# Patient Record
Sex: FEMALE | Race: BLACK OR AFRICAN AMERICAN | NOT HISPANIC OR LATINO
[De-identification: names, ages, dates, MRNs, and addresses within clinical notes are randomized per-mention and may not be internally consistent; named-entity substitution may affect disease eponyms.]

---

## 2017-02-27 ENCOUNTER — APPOINTMENT (OUTPATIENT)
Dept: OBGYN | Facility: CLINIC | Age: 42
End: 2017-02-27

## 2017-02-27 VITALS
SYSTOLIC BLOOD PRESSURE: 112 MMHG | DIASTOLIC BLOOD PRESSURE: 62 MMHG | HEIGHT: 63 IN | WEIGHT: 150 LBS | BODY MASS INDEX: 26.58 KG/M2

## 2017-02-27 RX ORDER — VALSARTAN 80 MG/1
80 TABLET, COATED ORAL
Qty: 30 | Refills: 0 | Status: COMPLETED | COMMUNITY
Start: 2016-12-21

## 2017-02-27 RX ORDER — PENTOXIFYLLINE 400 MG/1
400 TABLET, EXTENDED RELEASE ORAL
Qty: 90 | Refills: 0 | Status: DISCONTINUED | COMMUNITY
Start: 2016-09-15

## 2017-02-27 RX ORDER — RANITIDINE 300 MG/1
300 TABLET ORAL
Qty: 30 | Refills: 0 | Status: COMPLETED | COMMUNITY
Start: 2017-01-23

## 2017-03-08 LAB — CYTOLOGY CVX/VAG DOC THIN PREP: NORMAL

## 2017-03-17 ENCOUNTER — APPOINTMENT (OUTPATIENT)
Dept: ULTRASOUND IMAGING | Facility: IMAGING CENTER | Age: 42
End: 2017-03-17

## 2017-04-07 ENCOUNTER — FORM ENCOUNTER (OUTPATIENT)
Age: 42
End: 2017-04-07

## 2017-04-08 ENCOUNTER — APPOINTMENT (OUTPATIENT)
Dept: ULTRASOUND IMAGING | Facility: IMAGING CENTER | Age: 42
End: 2017-04-08

## 2017-04-08 ENCOUNTER — OUTPATIENT (OUTPATIENT)
Dept: OUTPATIENT SERVICES | Facility: HOSPITAL | Age: 42
LOS: 1 days | End: 2017-04-08
Payer: COMMERCIAL

## 2017-04-08 DIAGNOSIS — D25.1 INTRAMURAL LEIOMYOMA OF UTERUS: ICD-10-CM

## 2017-04-08 PROCEDURE — 76856 US EXAM PELVIC COMPLETE: CPT

## 2017-04-08 PROCEDURE — 76830 TRANSVAGINAL US NON-OB: CPT

## 2017-05-01 ENCOUNTER — APPOINTMENT (OUTPATIENT)
Dept: OBGYN | Facility: CLINIC | Age: 42
End: 2017-05-01

## 2017-06-01 ENCOUNTER — APPOINTMENT (OUTPATIENT)
Dept: OBGYN | Facility: CLINIC | Age: 42
End: 2017-06-01

## 2017-06-01 VITALS
HEIGHT: 63 IN | WEIGHT: 156 LBS | HEART RATE: 81 BPM | DIASTOLIC BLOOD PRESSURE: 75 MMHG | BODY MASS INDEX: 27.64 KG/M2 | SYSTOLIC BLOOD PRESSURE: 130 MMHG

## 2017-06-01 DIAGNOSIS — Z87.448 PERSONAL HISTORY OF OTHER DISEASES OF URINARY SYSTEM: ICD-10-CM

## 2017-06-01 DIAGNOSIS — N85.2 HYPERTROPHY OF UTERUS: ICD-10-CM

## 2017-06-01 DIAGNOSIS — R10.2 PELVIC AND PERINEAL PAIN: ICD-10-CM

## 2017-06-01 DIAGNOSIS — G89.29 PELVIC AND PERINEAL PAIN: ICD-10-CM

## 2017-06-01 DIAGNOSIS — D25.1 INTRAMURAL LEIOMYOMA OF UTERUS: ICD-10-CM

## 2017-06-01 DIAGNOSIS — R10.9 UNSPECIFIED ABDOMINAL PAIN: ICD-10-CM

## 2017-06-01 DIAGNOSIS — Z87.440 PERSONAL HISTORY OF URINARY (TRACT) INFECTIONS: ICD-10-CM

## 2017-06-13 ENCOUNTER — APPOINTMENT (OUTPATIENT)
Dept: MRI IMAGING | Facility: CLINIC | Age: 42
End: 2017-06-13

## 2017-06-14 ENCOUNTER — APPOINTMENT (OUTPATIENT)
Dept: RADIOLOGY | Facility: IMAGING CENTER | Age: 42
End: 2017-06-14

## 2017-06-14 ENCOUNTER — OUTPATIENT (OUTPATIENT)
Dept: OUTPATIENT SERVICES | Facility: HOSPITAL | Age: 42
LOS: 1 days | End: 2017-06-14
Payer: COMMERCIAL

## 2017-06-14 DIAGNOSIS — R10.9 UNSPECIFIED ABDOMINAL PAIN: ICD-10-CM

## 2017-06-14 DIAGNOSIS — R10.2 PELVIC AND PERINEAL PAIN: ICD-10-CM

## 2017-06-14 DIAGNOSIS — D25.1 INTRAMURAL LEIOMYOMA OF UTERUS: ICD-10-CM

## 2017-06-14 DIAGNOSIS — N85.2 HYPERTROPHY OF UTERUS: ICD-10-CM

## 2017-06-14 PROCEDURE — 71020: CPT | Mod: 26

## 2017-06-14 PROCEDURE — 71046 X-RAY EXAM CHEST 2 VIEWS: CPT

## 2017-06-20 ENCOUNTER — APPOINTMENT (OUTPATIENT)
Dept: MRI IMAGING | Facility: IMAGING CENTER | Age: 42
End: 2017-06-20

## 2017-06-20 ENCOUNTER — OUTPATIENT (OUTPATIENT)
Dept: OUTPATIENT SERVICES | Facility: HOSPITAL | Age: 42
LOS: 1 days | End: 2017-06-20
Payer: COMMERCIAL

## 2017-06-20 DIAGNOSIS — D25.1 INTRAMURAL LEIOMYOMA OF UTERUS: ICD-10-CM

## 2017-06-20 DIAGNOSIS — R10.2 PELVIC AND PERINEAL PAIN: ICD-10-CM

## 2017-06-20 DIAGNOSIS — N85.2 HYPERTROPHY OF UTERUS: ICD-10-CM

## 2017-06-20 DIAGNOSIS — G89.29 OTHER CHRONIC PAIN: ICD-10-CM

## 2017-06-20 DIAGNOSIS — R10.9 UNSPECIFIED ABDOMINAL PAIN: ICD-10-CM

## 2017-06-20 PROCEDURE — 82565 ASSAY OF CREATININE: CPT

## 2017-06-27 ENCOUNTER — OUTPATIENT (OUTPATIENT)
Dept: OUTPATIENT SERVICES | Facility: HOSPITAL | Age: 42
LOS: 1 days | End: 2017-06-27
Payer: COMMERCIAL

## 2017-06-27 ENCOUNTER — APPOINTMENT (OUTPATIENT)
Dept: CT IMAGING | Facility: IMAGING CENTER | Age: 42
End: 2017-06-27

## 2017-06-27 DIAGNOSIS — N85.2 HYPERTROPHY OF UTERUS: ICD-10-CM

## 2017-06-27 DIAGNOSIS — R10.2 PELVIC AND PERINEAL PAIN: ICD-10-CM

## 2017-06-27 DIAGNOSIS — R10.9 UNSPECIFIED ABDOMINAL PAIN: ICD-10-CM

## 2017-06-27 PROCEDURE — 82565 ASSAY OF CREATININE: CPT

## 2017-06-27 PROCEDURE — 74177 CT ABD & PELVIS W/CONTRAST: CPT

## 2017-06-29 DIAGNOSIS — R10.9 UNSPECIFIED ABDOMINAL PAIN: ICD-10-CM

## 2017-07-05 ENCOUNTER — APPOINTMENT (OUTPATIENT)
Dept: MRI IMAGING | Facility: IMAGING CENTER | Age: 42
End: 2017-07-05

## 2017-08-09 ENCOUNTER — MEDICATION RENEWAL (OUTPATIENT)
Age: 42
End: 2017-08-09

## 2017-08-15 ENCOUNTER — APPOINTMENT (OUTPATIENT)
Dept: OBGYN | Facility: CLINIC | Age: 42
End: 2017-08-15
Payer: COMMERCIAL

## 2017-08-15 PROCEDURE — 96372 THER/PROPH/DIAG INJ SC/IM: CPT

## 2017-08-29 ENCOUNTER — EMERGENCY (EMERGENCY)
Facility: HOSPITAL | Age: 42
LOS: 1 days | Discharge: HOME | End: 2017-08-29
Attending: EMERGENCY MEDICINE | Admitting: EMERGENCY MEDICINE
Payer: COMMERCIAL

## 2017-08-29 VITALS
TEMPERATURE: 99 F | OXYGEN SATURATION: 100 % | HEART RATE: 107 BPM | RESPIRATION RATE: 16 BRPM | SYSTOLIC BLOOD PRESSURE: 117 MMHG | DIASTOLIC BLOOD PRESSURE: 77 MMHG

## 2017-08-29 VITALS
OXYGEN SATURATION: 100 % | HEART RATE: 83 BPM | TEMPERATURE: 99 F | RESPIRATION RATE: 18 BRPM | DIASTOLIC BLOOD PRESSURE: 78 MMHG | SYSTOLIC BLOOD PRESSURE: 138 MMHG

## 2017-08-29 LAB
ALBUMIN SERPL ELPH-MCNC: 2.9 G/DL — LOW (ref 3.3–5)
ALP SERPL-CCNC: 51 U/L — SIGNIFICANT CHANGE UP (ref 40–120)
ALT FLD-CCNC: 12 U/L — SIGNIFICANT CHANGE UP (ref 4–33)
AMYLASE P1 CFR SERPL: 69 U/L — SIGNIFICANT CHANGE UP (ref 25–125)
ANISOCYTOSIS BLD QL: SLIGHT — SIGNIFICANT CHANGE UP
APPEARANCE UR: SIGNIFICANT CHANGE UP
AST SERPL-CCNC: 43 U/L — HIGH (ref 4–32)
BASOPHILS # BLD AUTO: 0.03 K/UL — SIGNIFICANT CHANGE UP (ref 0–0.2)
BASOPHILS NFR BLD AUTO: 0.2 % — SIGNIFICANT CHANGE UP (ref 0–2)
BASOPHILS NFR SPEC: 0 % — SIGNIFICANT CHANGE UP (ref 0–2)
BILIRUB SERPL-MCNC: 0.5 MG/DL — SIGNIFICANT CHANGE UP (ref 0.2–1.2)
BILIRUB UR-MCNC: NEGATIVE — SIGNIFICANT CHANGE UP
BLOOD UR QL VISUAL: HIGH
BUN SERPL-MCNC: 11 MG/DL — SIGNIFICANT CHANGE UP (ref 7–23)
CALCIUM SERPL-MCNC: 8.1 MG/DL — LOW (ref 8.4–10.5)
CHLORIDE SERPL-SCNC: 101 MMOL/L — SIGNIFICANT CHANGE UP (ref 98–107)
CO2 SERPL-SCNC: 18 MMOL/L — LOW (ref 22–31)
COLOR SPEC: YELLOW — SIGNIFICANT CHANGE UP
CREAT SERPL-MCNC: 0.79 MG/DL — SIGNIFICANT CHANGE UP (ref 0.5–1.3)
DACRYOCYTES BLD QL SMEAR: SLIGHT — SIGNIFICANT CHANGE UP
EOSINOPHIL # BLD AUTO: 0.04 K/UL — SIGNIFICANT CHANGE UP (ref 0–0.5)
EOSINOPHIL NFR BLD AUTO: 0.2 % — SIGNIFICANT CHANGE UP (ref 0–6)
EOSINOPHIL NFR FLD: 0 % — SIGNIFICANT CHANGE UP (ref 0–6)
GIANT PLATELETS BLD QL SMEAR: PRESENT — SIGNIFICANT CHANGE UP
GLUCOSE SERPL-MCNC: 101 MG/DL — HIGH (ref 70–99)
GLUCOSE UR-MCNC: NEGATIVE — SIGNIFICANT CHANGE UP
HCT VFR BLD CALC: 34.6 % — SIGNIFICANT CHANGE UP (ref 34.5–45)
HGB BLD-MCNC: 11.4 G/DL — LOW (ref 11.5–15.5)
IMM GRANULOCYTES # BLD AUTO: 0.09 # — SIGNIFICANT CHANGE UP
IMM GRANULOCYTES NFR BLD AUTO: 0.5 % — SIGNIFICANT CHANGE UP (ref 0–1.5)
KETONES UR-MCNC: NEGATIVE — SIGNIFICANT CHANGE UP
LEUKOCYTE ESTERASE UR-ACNC: NEGATIVE — SIGNIFICANT CHANGE UP
LIDOCAIN IGE QN: 19 U/L — SIGNIFICANT CHANGE UP (ref 7–60)
LYMPHOCYTES # BLD AUTO: 1.84 K/UL — SIGNIFICANT CHANGE UP (ref 1–3.3)
LYMPHOCYTES # BLD AUTO: 10.6 % — LOW (ref 13–44)
LYMPHOCYTES NFR SPEC AUTO: 15.7 % — SIGNIFICANT CHANGE UP (ref 13–44)
MCHC RBC-ENTMCNC: 27.9 PG — SIGNIFICANT CHANGE UP (ref 27–34)
MCHC RBC-ENTMCNC: 32.9 % — SIGNIFICANT CHANGE UP (ref 32–36)
MCV RBC AUTO: 84.8 FL — SIGNIFICANT CHANGE UP (ref 80–100)
MICROCYTES BLD QL: SLIGHT — SIGNIFICANT CHANGE UP
MONOCYTES # BLD AUTO: 1.95 K/UL — HIGH (ref 0–0.9)
MONOCYTES NFR BLD AUTO: 11.3 % — SIGNIFICANT CHANGE UP (ref 2–14)
MONOCYTES NFR BLD: 13 % — HIGH (ref 2–9)
MUCOUS THREADS # UR AUTO: SIGNIFICANT CHANGE UP
NEUTROPHIL AB SER-ACNC: 69.6 % — SIGNIFICANT CHANGE UP (ref 43–77)
NEUTROPHILS # BLD AUTO: 13.33 K/UL — HIGH (ref 1.8–7.4)
NEUTROPHILS NFR BLD AUTO: 77.2 % — HIGH (ref 43–77)
NEUTS BAND # BLD: 1.7 % — SIGNIFICANT CHANGE UP (ref 0–6)
NITRITE UR-MCNC: NEGATIVE — SIGNIFICANT CHANGE UP
NRBC # FLD: 0 — SIGNIFICANT CHANGE UP
OVALOCYTES BLD QL SMEAR: SLIGHT — SIGNIFICANT CHANGE UP
PH UR: 6.5 — SIGNIFICANT CHANGE UP (ref 4.6–8)
PLATELET # BLD AUTO: 210 K/UL — SIGNIFICANT CHANGE UP (ref 150–400)
PLATELET COUNT - ESTIMATE: NORMAL — SIGNIFICANT CHANGE UP
PMV BLD: 11.8 FL — SIGNIFICANT CHANGE UP (ref 7–13)
POIKILOCYTOSIS BLD QL AUTO: SLIGHT — SIGNIFICANT CHANGE UP
POTASSIUM SERPL-MCNC: SIGNIFICANT CHANGE UP MMOL/L (ref 3.5–5.3)
POTASSIUM SERPL-SCNC: SIGNIFICANT CHANGE UP MMOL/L (ref 3.5–5.3)
PROT SERPL-MCNC: 7.5 G/DL — SIGNIFICANT CHANGE UP (ref 6–8.3)
PROT UR-MCNC: >600 — SIGNIFICANT CHANGE UP
RBC # BLD: 4.08 M/UL — SIGNIFICANT CHANGE UP (ref 3.8–5.2)
RBC # FLD: 13.6 % — SIGNIFICANT CHANGE UP (ref 10.3–14.5)
RBC CASTS # UR COMP ASSIST: SIGNIFICANT CHANGE UP (ref 0–?)
REVIEW TO FOLLOW: YES — SIGNIFICANT CHANGE UP
SODIUM SERPL-SCNC: 135 MMOL/L — SIGNIFICANT CHANGE UP (ref 135–145)
SP GR SPEC: 1.03 — HIGH (ref 1–1.03)
SQUAMOUS # UR AUTO: SIGNIFICANT CHANGE UP
UROBILINOGEN FLD QL: NORMAL E.U. — SIGNIFICANT CHANGE UP (ref 0.1–0.2)
WBC # BLD: 17.28 K/UL — HIGH (ref 3.8–10.5)
WBC # FLD AUTO: 17.28 K/UL — HIGH (ref 3.8–10.5)
WBC UR QL: SIGNIFICANT CHANGE UP (ref 0–?)

## 2017-08-29 PROCEDURE — 99284 EMERGENCY DEPT VISIT MOD MDM: CPT

## 2017-08-29 PROCEDURE — 74177 CT ABD & PELVIS W/CONTRAST: CPT | Mod: 26

## 2017-08-29 RX ORDER — KETOROLAC TROMETHAMINE 30 MG/ML
1 SYRINGE (ML) INJECTION
Qty: 20 | Refills: 0
Start: 2017-08-29 | End: 2017-09-03

## 2017-08-29 RX ORDER — OXYCODONE AND ACETAMINOPHEN 5; 325 MG/1; MG/1
1 TABLET ORAL
Qty: 8 | Refills: 0
Start: 2017-08-29 | End: 2017-08-31

## 2017-08-29 RX ORDER — SODIUM CHLORIDE 9 MG/ML
1000 INJECTION INTRAMUSCULAR; INTRAVENOUS; SUBCUTANEOUS ONCE
Refills: 0 | Status: COMPLETED | OUTPATIENT
Start: 2017-08-29 | End: 2017-08-29

## 2017-08-29 RX ORDER — KETOROLAC TROMETHAMINE 30 MG/ML
30 SYRINGE (ML) INJECTION ONCE
Refills: 0 | Status: DISCONTINUED | OUTPATIENT
Start: 2017-08-29 | End: 2017-08-29

## 2017-08-29 RX ADMIN — SODIUM CHLORIDE 1000 MILLILITER(S): 9 INJECTION INTRAMUSCULAR; INTRAVENOUS; SUBCUTANEOUS at 09:39

## 2017-08-29 RX ADMIN — Medication 30 MILLIGRAM(S): at 10:27

## 2017-08-29 NOTE — ED PROVIDER NOTE - ABDOMINAL EXAM
firm, distended, mass like consistency felt throughout abd up to 2in below the xiphoid, normal bowel sounds, no focal tenderness, no CVA tenderness

## 2017-08-29 NOTE — ED PROVIDER NOTE - PROGRESS NOTE DETAILS
Annmarie BLANCO- pt is abel mejia after toradol, spoke to gyn regarding her ct results and recommend outpatient f.u, I discussed elevated wbc with patients, pt had fever - sub once over the weekend, no evidence of infection noted on ua, ct abd, no other signs and symptoms. Pt given toradol and percocet prescription, advised to return promptly in c/o worsening symptoms

## 2017-08-29 NOTE — ED PROVIDER NOTE - MEDICAL DECISION MAKING DETAILS
43 y/o F w/ PMHx of lupus and fibroids, presents to the ED c/o diffuse right sided abd pain. Initial evaluation pt is refusing CT. Concern for aggressive fibroid vs tumor, on June CT in system it appears 16cm fibroid but should not be covering entire abd. Pt states belly has been the same just pain is different and wants time to think about it. Will check labs in the meantime.

## 2017-09-01 ENCOUNTER — INPATIENT (INPATIENT)
Facility: HOSPITAL | Age: 42
LOS: 1 days | Discharge: HOME | End: 2017-09-03
Attending: OBSTETRICS & GYNECOLOGY | Admitting: OBSTETRICS & GYNECOLOGY
Payer: COMMERCIAL

## 2017-09-01 ENCOUNTER — RESULT REVIEW (OUTPATIENT)
Age: 42
End: 2017-09-01

## 2017-09-01 ENCOUNTER — TRANSCRIPTION ENCOUNTER (OUTPATIENT)
Age: 42
End: 2017-09-01

## 2017-09-01 VITALS
OXYGEN SATURATION: 100 % | SYSTOLIC BLOOD PRESSURE: 114 MMHG | RESPIRATION RATE: 16 BRPM | HEART RATE: 118 BPM | TEMPERATURE: 100 F | DIASTOLIC BLOOD PRESSURE: 78 MMHG

## 2017-09-01 DIAGNOSIS — D25.9 LEIOMYOMA OF UTERUS, UNSPECIFIED: ICD-10-CM

## 2017-09-01 DIAGNOSIS — M32.9 SYSTEMIC LUPUS ERYTHEMATOSUS, UNSPECIFIED: ICD-10-CM

## 2017-09-01 LAB
ALBUMIN SERPL ELPH-MCNC: 2.7 G/DL — LOW (ref 3.3–5)
ALP SERPL-CCNC: 73 U/L — SIGNIFICANT CHANGE UP (ref 40–120)
ALT FLD-CCNC: 10 U/L — SIGNIFICANT CHANGE UP (ref 4–33)
APPEARANCE UR: CLEAR — SIGNIFICANT CHANGE UP
APTT BLD: 31.6 SEC — SIGNIFICANT CHANGE UP (ref 27.5–37.4)
AST SERPL-CCNC: 21 U/L — SIGNIFICANT CHANGE UP (ref 4–32)
BACTERIA # UR AUTO: SIGNIFICANT CHANGE UP
BASE EXCESS BLDV CALC-SCNC: -0.4 MMOL/L — SIGNIFICANT CHANGE UP
BASOPHILS # BLD AUTO: 0.03 K/UL — SIGNIFICANT CHANGE UP (ref 0–0.2)
BASOPHILS NFR BLD AUTO: 0.2 % — SIGNIFICANT CHANGE UP (ref 0–2)
BILIRUB SERPL-MCNC: 0.4 MG/DL — SIGNIFICANT CHANGE UP (ref 0.2–1.2)
BILIRUB UR-MCNC: NEGATIVE — SIGNIFICANT CHANGE UP
BLD GP AB SCN SERPL QL: NEGATIVE — SIGNIFICANT CHANGE UP
BLOOD GAS VENOUS - CREATININE: 0.84 MG/DL — SIGNIFICANT CHANGE UP (ref 0.5–1.3)
BLOOD UR QL VISUAL: HIGH
BUN SERPL-MCNC: 6 MG/DL — LOW (ref 7–23)
CALCIUM SERPL-MCNC: 8.5 MG/DL — SIGNIFICANT CHANGE UP (ref 8.4–10.5)
CHLORIDE BLDV-SCNC: 105 MMOL/L — SIGNIFICANT CHANGE UP (ref 96–108)
CHLORIDE SERPL-SCNC: 99 MMOL/L — SIGNIFICANT CHANGE UP (ref 98–107)
CO2 SERPL-SCNC: 20 MMOL/L — LOW (ref 22–31)
COLOR SPEC: YELLOW — SIGNIFICANT CHANGE UP
CREAT SERPL-MCNC: 0.74 MG/DL — SIGNIFICANT CHANGE UP (ref 0.5–1.3)
EOSINOPHIL # BLD AUTO: 0.02 K/UL — SIGNIFICANT CHANGE UP (ref 0–0.5)
EOSINOPHIL NFR BLD AUTO: 0.1 % — SIGNIFICANT CHANGE UP (ref 0–6)
GAS PNL BLDV: 133 MMOL/L — LOW (ref 136–146)
GLUCOSE BLDV-MCNC: 130 — HIGH (ref 70–99)
GLUCOSE SERPL-MCNC: 123 MG/DL — HIGH (ref 70–99)
GLUCOSE UR-MCNC: NEGATIVE — SIGNIFICANT CHANGE UP
HCG SERPL-ACNC: < 5 MIU/ML — SIGNIFICANT CHANGE UP
HCO3 BLDV-SCNC: 23 MMOL/L — SIGNIFICANT CHANGE UP (ref 20–27)
HCT VFR BLD CALC: 32.2 % — LOW (ref 34.5–45)
HCT VFR BLDV CALC: 33.2 % — LOW (ref 34.5–45)
HGB BLD-MCNC: 10.7 G/DL — LOW (ref 11.5–15.5)
HGB BLDV-MCNC: 10.8 G/DL — LOW (ref 11.5–15.5)
IMM GRANULOCYTES # BLD AUTO: 0.13 # — SIGNIFICANT CHANGE UP
IMM GRANULOCYTES NFR BLD AUTO: 0.8 % — SIGNIFICANT CHANGE UP (ref 0–1.5)
INR BLD: 1.13 — SIGNIFICANT CHANGE UP (ref 0.88–1.17)
KETONES UR-MCNC: NEGATIVE — SIGNIFICANT CHANGE UP
LACTATE BLDV-MCNC: 1.7 MMOL/L — SIGNIFICANT CHANGE UP (ref 0.5–2)
LEUKOCYTE ESTERASE UR-ACNC: NEGATIVE — SIGNIFICANT CHANGE UP
LYMPHOCYTES # BLD AUTO: 1.3 K/UL — SIGNIFICANT CHANGE UP (ref 1–3.3)
LYMPHOCYTES # BLD AUTO: 7.5 % — LOW (ref 13–44)
MCHC RBC-ENTMCNC: 28 PG — SIGNIFICANT CHANGE UP (ref 27–34)
MCHC RBC-ENTMCNC: 33.2 % — SIGNIFICANT CHANGE UP (ref 32–36)
MCV RBC AUTO: 84.3 FL — SIGNIFICANT CHANGE UP (ref 80–100)
MONOCYTES # BLD AUTO: 2.21 K/UL — HIGH (ref 0–0.9)
MONOCYTES NFR BLD AUTO: 12.8 % — SIGNIFICANT CHANGE UP (ref 2–14)
NEUTROPHILS # BLD AUTO: 13.55 K/UL — HIGH (ref 1.8–7.4)
NEUTROPHILS NFR BLD AUTO: 78.6 % — HIGH (ref 43–77)
NITRITE UR-MCNC: NEGATIVE — SIGNIFICANT CHANGE UP
NRBC # FLD: 0 — SIGNIFICANT CHANGE UP
PCO2 BLDV: 42 MMHG — SIGNIFICANT CHANGE UP (ref 41–51)
PH BLDV: 7.37 PH — SIGNIFICANT CHANGE UP (ref 7.32–7.43)
PH UR: 7 — SIGNIFICANT CHANGE UP (ref 4.6–8)
PLATELET # BLD AUTO: 264 K/UL — SIGNIFICANT CHANGE UP (ref 150–400)
PMV BLD: 11.6 FL — SIGNIFICANT CHANGE UP (ref 7–13)
PO2 BLDV: < 24 MMHG — LOW (ref 35–40)
POTASSIUM BLDV-SCNC: 3.8 MMOL/L — SIGNIFICANT CHANGE UP (ref 3.4–4.5)
POTASSIUM SERPL-MCNC: 3.8 MMOL/L — SIGNIFICANT CHANGE UP (ref 3.5–5.3)
POTASSIUM SERPL-SCNC: 3.8 MMOL/L — SIGNIFICANT CHANGE UP (ref 3.5–5.3)
PROT SERPL-MCNC: 7.4 G/DL — SIGNIFICANT CHANGE UP (ref 6–8.3)
PROT UR-MCNC: 500 — HIGH
PROTHROM AB SERPL-ACNC: 12.7 SEC — SIGNIFICANT CHANGE UP (ref 9.8–13.1)
RBC # BLD: 3.82 M/UL — SIGNIFICANT CHANGE UP (ref 3.8–5.2)
RBC # FLD: 13.7 % — SIGNIFICANT CHANGE UP (ref 10.3–14.5)
RBC CASTS # UR COMP ASSIST: HIGH (ref 0–?)
RH IG SCN BLD-IMP: NEGATIVE — SIGNIFICANT CHANGE UP
RH IG SCN BLD-IMP: NEGATIVE — SIGNIFICANT CHANGE UP
SAO2 % BLDV: 22.4 % — LOW (ref 60–85)
SODIUM SERPL-SCNC: 136 MMOL/L — SIGNIFICANT CHANGE UP (ref 135–145)
SP GR SPEC: 1.01 — SIGNIFICANT CHANGE UP (ref 1–1.03)
SQUAMOUS # UR AUTO: SIGNIFICANT CHANGE UP
UROBILINOGEN FLD QL: NORMAL E.U. — SIGNIFICANT CHANGE UP (ref 0.1–0.2)
WBC # BLD: 17.24 K/UL — HIGH (ref 3.8–10.5)
WBC # FLD AUTO: 17.24 K/UL — HIGH (ref 3.8–10.5)
WBC UR QL: SIGNIFICANT CHANGE UP (ref 0–?)

## 2017-09-01 PROCEDURE — 88307 TISSUE EXAM BY PATHOLOGIST: CPT | Mod: 26

## 2017-09-01 PROCEDURE — 88304 TISSUE EXAM BY PATHOLOGIST: CPT | Mod: 26

## 2017-09-01 PROCEDURE — 88331 PATH CONSLTJ SURG 1 BLK 1SPC: CPT | Mod: 26

## 2017-09-01 PROCEDURE — 58150 TOTAL HYSTERECTOMY: CPT

## 2017-09-01 RX ORDER — ACETAMINOPHEN 500 MG
1000 TABLET ORAL ONCE
Refills: 0 | Status: COMPLETED | OUTPATIENT
Start: 2017-09-01 | End: 2017-09-01

## 2017-09-01 RX ORDER — HEPARIN SODIUM 5000 [USP'U]/ML
5000 INJECTION INTRAVENOUS; SUBCUTANEOUS ONCE
Refills: 0 | Status: COMPLETED | OUTPATIENT
Start: 2017-09-01 | End: 2017-09-01

## 2017-09-01 RX ORDER — HYDROMORPHONE HYDROCHLORIDE 2 MG/ML
0.5 INJECTION INTRAMUSCULAR; INTRAVENOUS; SUBCUTANEOUS
Refills: 0 | Status: DISCONTINUED | OUTPATIENT
Start: 2017-09-01 | End: 2017-09-02

## 2017-09-01 RX ORDER — HEPARIN SODIUM 5000 [USP'U]/ML
5000 INJECTION INTRAVENOUS; SUBCUTANEOUS EVERY 12 HOURS
Refills: 0 | Status: DISCONTINUED | OUTPATIENT
Start: 2017-09-01 | End: 2017-09-03

## 2017-09-01 RX ORDER — ACETAMINOPHEN 500 MG
975 TABLET ORAL EVERY 6 HOURS
Refills: 0 | Status: DISCONTINUED | OUTPATIENT
Start: 2017-09-01 | End: 2017-09-03

## 2017-09-01 RX ORDER — NALOXONE HYDROCHLORIDE 4 MG/.1ML
0.1 SPRAY NASAL
Refills: 0 | Status: DISCONTINUED | OUTPATIENT
Start: 2017-09-01 | End: 2017-09-03

## 2017-09-01 RX ORDER — DIPHENHYDRAMINE HCL 50 MG
50 CAPSULE ORAL EVERY 4 HOURS
Refills: 0 | Status: DISCONTINUED | OUTPATIENT
Start: 2017-09-01 | End: 2017-09-03

## 2017-09-01 RX ORDER — HYDROMORPHONE HYDROCHLORIDE 2 MG/ML
1 INJECTION INTRAMUSCULAR; INTRAVENOUS; SUBCUTANEOUS
Refills: 0 | Status: DISCONTINUED | OUTPATIENT
Start: 2017-09-01 | End: 2017-09-02

## 2017-09-01 RX ORDER — HYDROXYCHLOROQUINE SULFATE 200 MG
400 TABLET ORAL ONCE
Refills: 0 | Status: COMPLETED | OUTPATIENT
Start: 2017-09-01 | End: 2017-09-01

## 2017-09-01 RX ORDER — KETOROLAC TROMETHAMINE 30 MG/ML
15 SYRINGE (ML) INJECTION ONCE
Refills: 0 | Status: DISCONTINUED | OUTPATIENT
Start: 2017-09-01 | End: 2017-09-01

## 2017-09-01 RX ORDER — MYCOPHENOLATE MOFETIL 250 MG/1
1500 CAPSULE ORAL
Refills: 0 | Status: DISCONTINUED | OUTPATIENT
Start: 2017-09-01 | End: 2017-09-03

## 2017-09-01 RX ORDER — HYDROMORPHONE HYDROCHLORIDE 2 MG/ML
30 INJECTION INTRAMUSCULAR; INTRAVENOUS; SUBCUTANEOUS
Refills: 0 | Status: DISCONTINUED | OUTPATIENT
Start: 2017-09-01 | End: 2017-09-02

## 2017-09-01 RX ORDER — MYCOPHENOLATE MOFETIL 250 MG/1
3000 CAPSULE ORAL ONCE
Refills: 0 | Status: DISCONTINUED | OUTPATIENT
Start: 2017-09-01 | End: 2017-09-01

## 2017-09-01 RX ORDER — SODIUM CHLORIDE 9 MG/ML
1000 INJECTION, SOLUTION INTRAVENOUS
Refills: 0 | Status: DISCONTINUED | OUTPATIENT
Start: 2017-09-01 | End: 2017-09-02

## 2017-09-01 RX ORDER — CHLORHEXIDINE GLUCONATE 213 G/1000ML
1 SOLUTION TOPICAL DAILY
Refills: 0 | Status: DISCONTINUED | OUTPATIENT
Start: 2017-09-01 | End: 2017-09-01

## 2017-09-01 RX ORDER — ONDANSETRON 8 MG/1
4 TABLET, FILM COATED ORAL EVERY 6 HOURS
Refills: 0 | Status: DISCONTINUED | OUTPATIENT
Start: 2017-09-01 | End: 2017-09-03

## 2017-09-01 RX ORDER — VALSARTAN 80 MG/1
80 TABLET ORAL DAILY
Refills: 0 | Status: DISCONTINUED | OUTPATIENT
Start: 2017-09-01 | End: 2017-09-01

## 2017-09-01 RX ORDER — KETOROLAC TROMETHAMINE 30 MG/ML
30 SYRINGE (ML) INJECTION EVERY 6 HOURS
Refills: 0 | Status: DISCONTINUED | OUTPATIENT
Start: 2017-09-01 | End: 2017-09-02

## 2017-09-01 RX ORDER — MORPHINE SULFATE 50 MG/1
4 CAPSULE, EXTENDED RELEASE ORAL ONCE
Refills: 0 | Status: DISCONTINUED | OUTPATIENT
Start: 2017-09-01 | End: 2017-09-01

## 2017-09-01 RX ORDER — VALSARTAN 80 MG/1
80 TABLET ORAL DAILY
Refills: 0 | Status: DISCONTINUED | OUTPATIENT
Start: 2017-09-01 | End: 2017-09-03

## 2017-09-01 RX ORDER — SODIUM CHLORIDE 9 MG/ML
2000 INJECTION INTRAMUSCULAR; INTRAVENOUS; SUBCUTANEOUS ONCE
Refills: 0 | Status: COMPLETED | OUTPATIENT
Start: 2017-09-01 | End: 2017-09-01

## 2017-09-01 RX ADMIN — Medication 50 MILLIGRAM(S): at 21:15

## 2017-09-01 RX ADMIN — SODIUM CHLORIDE 125 MILLILITER(S): 9 INJECTION, SOLUTION INTRAVENOUS at 17:44

## 2017-09-01 RX ADMIN — HYDROMORPHONE HYDROCHLORIDE 30 MILLILITER(S): 2 INJECTION INTRAMUSCULAR; INTRAVENOUS; SUBCUTANEOUS at 16:00

## 2017-09-01 RX ADMIN — MORPHINE SULFATE 4 MILLIGRAM(S): 50 CAPSULE, EXTENDED RELEASE ORAL at 08:31

## 2017-09-01 RX ADMIN — HEPARIN SODIUM 5000 UNIT(S): 5000 INJECTION INTRAVENOUS; SUBCUTANEOUS at 21:15

## 2017-09-01 RX ADMIN — MORPHINE SULFATE 4 MILLIGRAM(S): 50 CAPSULE, EXTENDED RELEASE ORAL at 09:09

## 2017-09-01 RX ADMIN — HYDROMORPHONE HYDROCHLORIDE 30 MILLILITER(S): 2 INJECTION INTRAMUSCULAR; INTRAVENOUS; SUBCUTANEOUS at 19:12

## 2017-09-01 RX ADMIN — SODIUM CHLORIDE 2000 MILLILITER(S): 9 INJECTION INTRAMUSCULAR; INTRAVENOUS; SUBCUTANEOUS at 07:47

## 2017-09-01 RX ADMIN — CHLORHEXIDINE GLUCONATE 1 APPLICATION(S): 213 SOLUTION TOPICAL at 11:25

## 2017-09-01 RX ADMIN — Medication 400 MILLIGRAM(S): at 09:02

## 2017-09-01 RX ADMIN — Medication 1000 MILLIGRAM(S): at 09:23

## 2017-09-01 RX ADMIN — HYDROMORPHONE HYDROCHLORIDE 0.5 MILLIGRAM(S): 2 INJECTION INTRAMUSCULAR; INTRAVENOUS; SUBCUTANEOUS at 16:52

## 2017-09-01 RX ADMIN — SODIUM CHLORIDE 125 MILLILITER(S): 9 INJECTION, SOLUTION INTRAVENOUS at 21:15

## 2017-09-01 RX ADMIN — HEPARIN SODIUM 5000 UNIT(S): 5000 INJECTION INTRAVENOUS; SUBCUTANEOUS at 11:27

## 2017-09-01 RX ADMIN — Medication 400 MILLIGRAM(S): at 21:15

## 2017-09-01 RX ADMIN — HYDROMORPHONE HYDROCHLORIDE 30 MILLILITER(S): 2 INJECTION INTRAMUSCULAR; INTRAVENOUS; SUBCUTANEOUS at 17:44

## 2017-09-01 RX ADMIN — SODIUM CHLORIDE 125 MILLILITER(S): 9 INJECTION, SOLUTION INTRAVENOUS at 10:27

## 2017-09-01 NOTE — BRIEF OPERATIVE NOTE - PROCEDURE
Salpingectomy, left  09/01/2017    Active  [email or web address]  Right salpingo-oophorectomy  09/01/2017    Active  [email or web address]  Total abdominal hysterectomy  09/01/2017    Active  [email or web address]

## 2017-09-01 NOTE — BRIEF OPERATIVE NOTE - OPERATION/FINDINGS
large 24wk size fibroid uterus, adhesion of right adnexa to uterus otherwise grossly normal adnexa bilaterally, normal appendix  frozen section-benign

## 2017-09-01 NOTE — PROGRESS NOTE ADULT - PROBLEM SELECTOR PLAN 1
Neuro: c/w PCA  CV: VSS, CBC in AM  Pulm: Saturating well on room air, encourage incentive spirometry  GI: Clear liquid diet  : UOP adequate, c/w jordan until POD#1  Heme: HSQ and SCDs for DVT ppx  Dispo: Continue routine post-op care Neuro: c/w PCA  CV: VSS, CBC in AM  Pulm: Saturating well on room air, encourage incentive spirometry  GI: Tolerating clears well, no nausea, give regular diet  : UOP adequate, c/w jordan until POD#1  Heme: HSQ and SCDs for DVT ppx  Dispo: Continue routine post-op care

## 2017-09-01 NOTE — ED ADULT NURSE NOTE - OBJECTIVE STATEMENT
Patient bought in room 8 via wheelchair, appears very uncomfortable, alert and oriented x4, respirations even and unlabored, abdomen firm distended, tender to palpation, no edema noted. Patient is unable to lie flat without assistance due to discomfort. 20 gauge saline lock placed on right AC, blood drawn and sent, including cultures. Urinalysis and urine culture sent. Will follow up and monitor.

## 2017-09-01 NOTE — ED PROVIDER NOTE - MEDICAL DECISION MAKING DETAILS
Pt is a 43 y/o F nonsmoker PMHx lupus, fibroids p/w abdominal pain x 1 week -- degenerating fibroid -- labs, blood cultures, fluids, ua, pain control, gyn consult

## 2017-09-01 NOTE — H&P ADULT - FAMILY HISTORY
Grandparent  Still living? Unknown  Family history of systemic lupus erythematosus, Age at diagnosis: Age Unknown

## 2017-09-01 NOTE — H&P ADULT - ATTENDING COMMENTS
41yo P1, LMP 8/26, with known enlarged fibroid uterus currently undergoing medical management with Lupron presents for the second time in 48hrs with severe abdominal pain secondary to degenerating uterine fibroids.  Discussed in detail medical vs surgical management.  Pt had been planning to undergo definitive surgical management as an outpatient with Dr. Agee.  Given the increased severe pain she would like to proceed with surgery sooner.  Discussed surgical management would include a total vs supracervical hysterectomy and bilateral salpingectomy.  Given the size of the uterus she would need a vertical laparotomy.  Risks including bleeding, infection (especially in the setting of a degenerating fibroid) and damage to internal structures including bowel, bladder, ureters, ovaries explained in detail.  I explained that this procedure would render the patient infertile and she would no longer be able to have children.  Questions and concerns addressed to apparent satisfaction. Consents obtained and witnessed.  Will proceed to the OR.  Plan also discussed with Dr. Agee.    Sandhya Garibay

## 2017-09-01 NOTE — H&P ADULT - NSHPPHYSICALEXAM_GEN_ALL_CORE
Constitutional: AAOx3, NAD  Respiratory: CTA b/l  Cardiovascular: RRR, no murmurs, rubs, gallops  Gastrointestinal: Tender to light palpation in the RUQ and RLQ, nondistended, no rebound, no guarding  Speculum exam: blood and small clot in  vaginal vault. Scan vaginal discharge, multiparous cervix.  Bimanual exam: Bulky, uterus approximately 24 week size. R fundus tender to palpation. Adnexa nonpalpable bilaterally, no CMT. Constitutional: AAOx3, notable discomfort with movement  Gastrointestinal: Tender to light palpation in the RUQ and RLQ, nondistended, no rebound, no guarding  Speculum exam: blood and small clot in vaginal vault. Multiparous cervix.  Bimanual exam: Bulky, uterus approximately 24 week size. R fundus tender to palpation. Adnexa nonpalpable bilaterally, no CMT.

## 2017-09-01 NOTE — H&P ADULT - ASSESSMENT
41 yo , LMP: 17, with a PMH of fibroids and lupus presents with RLQ abdominal pain and fever x 1 week, s/p Lupron treatment. Admitted for 19 x 12.9 x 14.3 cm degenerating fibroid requiring a total abdominal hysterectomy today. 43 yo , LMP: 17, with a PMH of fibroids and lupus presents with RLQ abdominal pain and fever x 1 week, s/p Lupron treatment. Admitted for 19 x 12.9 x 14.3 cm degenerating fibroid with significant abdominal pain.

## 2017-09-01 NOTE — ED ADULT TRIAGE NOTE - CHIEF COMPLAINT QUOTE
Pt c/o RLQ pain and lower back pain since Saturday with fever (Tmax 102) nausea and diarrhea (2 episodes today.) States she was here on Tuesday with the same complaints. Unable to ambulate 2/2 pain. Appears uncomfortable in triage

## 2017-09-01 NOTE — PROGRESS NOTE ADULT - PROBLEM SELECTOR PLAN 2
- back on home meds: cellcept 3000mg, plaquinil 400mg, valsartan 80mg  - s/p stress dose of steroids  - prednisone taper: - home meds were: cellcept 1500mg BID, plaquinil 400mg daily, valsartan 80mg daily, 10mg prednisone  - continue with cellcept, plaquenil, valsartan as per above home regimen  - s/p stress dose of steroids (100mg hydrocortisone intraop)  - prednisone taper: 50mg q8h x 3 doses, 20mg q8h x 3 doses, 10mg q8h x 3 doses, then resume 10mg daily.    D/w Dr. Colt Ledbetter, PGY1 - home meds were: cellcept 1500mg BID, plaquinil 400mg daily, valsartan 80mg daily, 10mg prednisone  - continue with cellcept, plaquenil, valsartan as per above home regimen  - s/p stress dose of steroids (100mg hydrocortisone intraop)  - prednisone taper: 50mg q8h x 3 doses, 20mg q8h x 3 doses, 10mg q8h x 3 doses, then resume 10mg daily. (discussed this taper, verified as appropriate w inpt pharmacy [id number])    D/w Dr. Colt Ledbetter, PGY1

## 2017-09-01 NOTE — H&P ADULT - NSHPLABSRESULTS_GEN_ALL_CORE
LABS:  CBC (09-01 @ 08:00)                              10.7<L>                         17.24<H>  )----------------(  264        78.6<H>% Neutrophils, 7.5<L>% Lymphocytes, ANC: 13.55<H>                              32.2<L>                BMP (09-01 @ 08:00)             136     |  99      |  6<L>  		Ca++ --      Ca 8.5                ---------------------------------( 123<H>		Mg --                 3.8     |  20<L>   |  0.74  			Ph --        LFTs (09-01 @ 08:00)      TPro 7.4 / Alb 2.7<L> / TBili 0.4 / DBili -- / AST 21 / ALT 10 / AlkPhos 73    Coags (09-01 @ 08:00)  aPTT 31.6 / INR 1.13 / PT 12.7    ABG (09-01 @ 08:00)      /  /  /  /  / %     Lactate:   1.7    VBG (09-01 @ 08:00)     7.37 / 42 / < 24<L> / 23 / -0.4 / 22.4<L>%    IMAGING:  < from: CT Abdomen and Pelvis w/ IV Cont (08.29.17 @ 11:59) >    EXAM:  CT ABDOMEN AND PELVIS IC        PROCEDURE DATE:  Aug 29 2017         INTERPRETATION:  CLINICAL INFORMATION: Right lower quadrant pain for 3   days.    COMPARISON: CT scan of the abdomen and pelvis from June 27, 2017. Pelvic   sonogram from April 8, 2017.    PROCEDURE:   CT of the Abdomen and Pelvis was performed with intravenous contrast.   Intravenous contrast: 90 ml Omnipaque 350. 10 ml discarded.  Oral contrast: None.  Sagittal and coronal reformats were performed.    FINDINGS:    LOWER CHEST: Minimal linear atelectasis in the left base.    LIVER: Within normal limits.  BILE DUCTS: Normal caliber.  GALLBLADDER: Within normal limits.  SPLEEN: Within normal limits.  PANCREAS: Within normal limits.  ADRENALS: Within normal limits.  KIDNEYS/URETERS: Within normal limits.    BLADDER: Nondistended limiting evaluation.  REPRODUCTIVE ORGANS: An enlarged fibroid uterus is again seen. A large   right-sided uterine fibroid is again noted. It measures 19 cm   superior-inferior x 12.9 cm AP x 14.3 cm transverse to slightly larger.   It has undergone complete cystic degeneration/necrosis. Calcification is   again present. The remainder of the uterus is deviated to the left.  No adnexal masses are seen.    BOWEL: No bowel obstruction. Appendix is unremarkable.  PERITONEUM: No ascites.  VESSELS:  Within normal limits.  RETROPERITONEUM: No lymphadenopathy.    ABDOMINAL WALL: Within normal limits.  BONES: Within normal limits.    IMPRESSION: Enlarged fibroid uterus.    A very large right-sided uterine fibroid is slightly larger in size and   has undergone interval complete cystic degeneration/necrosis.    Normal appendix.    < end of copied text > LABS:  CBC (09-01 @ 08:00)                              10.7<L>                         17.24<H>  )----------------(  264        78.6<H>% Neutrophils, 7.5<L>% Lymphocytes, ANC: 13.55<H>                              32.2<L>                BMP (09-01 @ 08:00)             136     |  99      |  6<L>  		Ca++ --      Ca 8.5                ---------------------------------( 123<H>		Mg --                 3.8     |  20<L>   |  0.74  			Ph --        LFTs (09-01 @ 08:00)      TPro 7.4 / Alb 2.7<L> / TBili 0.4 / DBili -- / AST 21 / ALT 10 / AlkPhos 73    Coags (09-01 @ 08:00)  aPTT 31.6 / INR 1.13 / PT 12.7    ABG (09-01 @ 08:00)      /  /  /  /  / %     Lactate:   1.7    VBG (09-01 @ 08:00)     7.37 / 42 / < 24<L> / 23 / -0.4 / 22.4<L>%    Type + Screen (09.01.17 @ 07:29)    ABO Interpretation: O    Rh Interpretation: Negative    Antibody Screen: Negative      IMAGING:  < from: CT Abdomen and Pelvis w/ IV Cont (08.29.17 @ 11:59) >    EXAM:  CT ABDOMEN AND PELVIS IC        PROCEDURE DATE:  Aug 29 2017         INTERPRETATION:  CLINICAL INFORMATION: Right lower quadrant pain for 3   days.    COMPARISON: CT scan of the abdomen and pelvis from June 27, 2017. Pelvic   sonogram from April 8, 2017.    PROCEDURE:   CT of the Abdomen and Pelvis was performed with intravenous contrast.   Intravenous contrast: 90 ml Omnipaque 350. 10 ml discarded.  Oral contrast: None.  Sagittal and coronal reformats were performed.    FINDINGS:    LOWER CHEST: Minimal linear atelectasis in the left base.    LIVER: Within normal limits.  BILE DUCTS: Normal caliber.  GALLBLADDER: Within normal limits.  SPLEEN: Within normal limits.  PANCREAS: Within normal limits.  ADRENALS: Within normal limits.  KIDNEYS/URETERS: Within normal limits.    BLADDER: Nondistended limiting evaluation.  REPRODUCTIVE ORGANS: An enlarged fibroid uterus is again seen. A large   right-sided uterine fibroid is again noted. It measures 19 cm   superior-inferior x 12.9 cm AP x 14.3 cm transverse to slightly larger.   It has undergone complete cystic degeneration/necrosis. Calcification is   again present. The remainder of the uterus is deviated to the left.  No adnexal masses are seen.    BOWEL: No bowel obstruction. Appendix is unremarkable.  PERITONEUM: No ascites.  VESSELS:  Within normal limits.  RETROPERITONEUM: No lymphadenopathy.    ABDOMINAL WALL: Within normal limits.  BONES: Within normal limits.    IMPRESSION: Enlarged fibroid uterus.    A very large right-sided uterine fibroid is slightly larger in size and   has undergone interval complete cystic degeneration/necrosis.    Normal appendix.    < end of copied text >

## 2017-09-01 NOTE — H&P ADULT - PROBLEM SELECTOR PLAN 1
- Plan for OR today, consents obtained  - NPO  - Pre-op labs  - Pain control with Toradol and IV Tylenol    D/w Dr. Stiles and Dr. Sascha Arteaga PGY1 - Plan for OR today, consents obtained, 2u pRBC on hold for OR  - NPO, IVF  - Pain control with Toradol and IV Tylenol    D/w Dr. Stiles and Dr. Sascha Arteaga, PGY1 - Discussed surgical vs continue medical management with patient  - Plan for OR today, consents obtained, 2u pRBC on hold for OR  - NPO, IVF  - Pain control with Toradol and IV Tylenol    Pt seen and examined and plan of care discussed with Dr. Stiles and Dr. Ben Arteaga, PGY1

## 2017-09-01 NOTE — ED PROVIDER NOTE - ATTENDING CONTRIBUTION TO CARE
[place]   pt here with c/o low abd pain  fever    seen here recently for same  CT abd  with large fibroid uterus  Pt with persistent pain despite pain meds  no URI sxs no urinary sxs  2 soft stools today  no rashes or joint aches  exam  pt not toxic appearing  clear lungs  card Mild tachycardia  regular  no gallop  abd  uterus palpated above umbilicus  mild diffuse tenderness   focally tender RLQ  no focal edema  no focal joint stiffness   c/w necrotic fibroid  will check labwork  GYN to see

## 2017-09-01 NOTE — H&P ADULT - HISTORY OF PRESENT ILLNESS
43 yo , LMP :17, with a PMH of fibroids and uterus presents to ED with increase RLQ pain and fever x 1 week. Pt sees Dr. Agee with medical management of a large, right sided myoma. Pt received Lupron 2-3 weeks ago. Pain is a 10/10 on the pain scale and began when her menses started. Pt was in the ED for the same problem on . CT scan was significant for a 19 cm superior-inferior x 12.9 cm AP x14.3 cm transverse myoma that has undergone interval complete cystic degeneration/necrosis. Pt given Toradol for pain and discharged with percocet. Pt has minimal relief with Tylenol and percocet. Pain radiates to her R thigh and has difficulty ambulating due to pain. She reports fever at home of 100.2. Today in the ED she had a rectal temp of 102.5 43 yo , LMP: 17, with a PMH of fibroids and Lupus presents to ED with increase RLQ pain and fever x 1 week. Pt's GYN is Dr. Agee for surgical management of a  large, right sided myoma. Pt received Lupron 2-3 weeks ago prior to obtaining a scheduled OR date. RLQ pain began when her menses started 1 week ago and is a 10/10 on the pain scale that radiates to her R thigh. Pt has difficulty ambulating. She endorses low grade fever at home (100.2). Pt initially presented to the ED on  for the same pain. CT scan showed a 19 cm superior-inferior x 12.9 cm AP x14.3 cm transverse myoma that has undergone interval complete cystic degeneration/necrosis. Pt was given Toradol for pain and discharged with percocet. Pt has since had minimal relief with Tylenol and percocet. She's had 2 episodes of watery diarrhea this week. Today in the ED she had a rectal temp of 102.5. Denies nausea, vaginal discharge, dysuria. 41 yo , LMP: 17, with a PMH of fibroids and Lupus presents to ED with increased RLQ pain and fever x 1 week. Pt is being seen by Dr. Agee (GYN) for possible surgical management of a large, right sided myoma. Pt received Lupron 2-3 weeks ago. RLQ pain began when her menses started 1 week ago and is a 10/10 on the pain scale that radiates to her R thigh. Pt has difficulty ambulating. She endorses a low grade fever at home (100.2, 100.4). Pt initially presented to the ED on  for the same pain. CT scan () showed a 19 cm superior-inferior x 12.9 cm AP x14.3 cm transverse myoma that has undergone interval complete cystic degeneration/necrosis. Pt was given Toradol for pain and discharged with percocet. Pt has since had minimal relief with Tylenol and percocet. She's had 2 episodes of watery diarrhea this week. Today in the ED she had a rectal temp of 102.5. Denies nausea, vaginal discharge, dysuria, CP, SOB.     OBhx: TOPx1,  ()  GYNhx: fibroid, regular menses  PMH: Lupus (no recent flares)  PSx: none  All: Lamisil (rash  Soc: occasional EtOH use (wine), denies tobacco and illicit drug use  Meds: 3000 mg Cellcept, 400 mg Plaquenil, 10 mg prednisone, 80 mg Valsartan

## 2017-09-01 NOTE — ED PROVIDER NOTE - OBJECTIVE STATEMENT
Pt is a 43 y/o F nonsmoker PMHx lupus, fibroids p/w abdominal pain x 1 week.  Pt states for past week she has had right lower quadrant pain radiating to right lower back, which worsens with movement; moderate to severe in intensity associated with fevers, Tmax 100.4 F.  Pt was seen a few days ago and was found to have a 19 cm degenerating fibroid and was discharged in NSAIDs and percocet.  Pt states pain not relieved with pain meds and she has had persistent fevers.  Pt's GYN is Dr. Agee.  Pt notes mild diarrhea; nonbloody; not black.  Denies any nausea, vomiting, constipation, dysuria, cloudy urine, cough, sore throat, rashes, sick contacts, recent travel history, recent antibiotic use, recent hospitalizations.  Pt presently on her menstrual period.

## 2017-09-01 NOTE — ED ADULT NURSE REASSESSMENT NOTE - NS ED NURSE REASSESS COMMENT FT1
Patient received in room 8 AA&Ox3 c/o suprapubic pain. VSS on RA. Patient denies chest pain, N/V, SOB, chills at this time. Medications administered - will continue to monitor.

## 2017-09-02 DIAGNOSIS — Z90.710 ACQUIRED ABSENCE OF BOTH CERVIX AND UTERUS: ICD-10-CM

## 2017-09-02 LAB
BACTERIA UR CULT: SIGNIFICANT CHANGE UP
BASOPHILS # BLD AUTO: 0 K/UL — SIGNIFICANT CHANGE UP (ref 0–0.2)
BASOPHILS NFR BLD AUTO: 0 % — SIGNIFICANT CHANGE UP (ref 0–2)
EOSINOPHIL # BLD AUTO: 0 K/UL — SIGNIFICANT CHANGE UP (ref 0–0.5)
EOSINOPHIL NFR BLD AUTO: 0 % — SIGNIFICANT CHANGE UP (ref 0–6)
HCT VFR BLD CALC: 26 % — LOW (ref 34.5–45)
HGB BLD-MCNC: 8.7 G/DL — LOW (ref 11.5–15.5)
IMM GRANULOCYTES # BLD AUTO: 0.11 # — SIGNIFICANT CHANGE UP
IMM GRANULOCYTES NFR BLD AUTO: 0.8 % — SIGNIFICANT CHANGE UP (ref 0–1.5)
LYMPHOCYTES # BLD AUTO: 0.74 K/UL — LOW (ref 1–3.3)
LYMPHOCYTES # BLD AUTO: 5.2 % — LOW (ref 13–44)
MCHC RBC-ENTMCNC: 27.6 PG — SIGNIFICANT CHANGE UP (ref 27–34)
MCHC RBC-ENTMCNC: 33.5 % — SIGNIFICANT CHANGE UP (ref 32–36)
MCV RBC AUTO: 82.5 FL — SIGNIFICANT CHANGE UP (ref 80–100)
MONOCYTES # BLD AUTO: 0.79 K/UL — SIGNIFICANT CHANGE UP (ref 0–0.9)
MONOCYTES NFR BLD AUTO: 5.6 % — SIGNIFICANT CHANGE UP (ref 2–14)
NEUTROPHILS # BLD AUTO: 12.49 K/UL — HIGH (ref 1.8–7.4)
NEUTROPHILS NFR BLD AUTO: 88.4 % — HIGH (ref 43–77)
NRBC # FLD: 0 — SIGNIFICANT CHANGE UP
PLATELET # BLD AUTO: 256 K/UL — SIGNIFICANT CHANGE UP (ref 150–400)
PMV BLD: 11.3 FL — SIGNIFICANT CHANGE UP (ref 7–13)
RBC # BLD: 3.15 M/UL — LOW (ref 3.8–5.2)
RBC # FLD: 13.7 % — SIGNIFICANT CHANGE UP (ref 10.3–14.5)
SPECIMEN SOURCE: SIGNIFICANT CHANGE UP
WBC # BLD: 14.13 K/UL — HIGH (ref 3.8–10.5)
WBC # FLD AUTO: 14.13 K/UL — HIGH (ref 3.8–10.5)

## 2017-09-02 RX ORDER — SODIUM CHLORIDE 9 MG/ML
3 INJECTION INTRAMUSCULAR; INTRAVENOUS; SUBCUTANEOUS EVERY 8 HOURS
Refills: 0 | Status: DISCONTINUED | OUTPATIENT
Start: 2017-09-02 | End: 2017-09-03

## 2017-09-02 RX ORDER — MYCOPHENOLATE MOFETIL 250 MG/1
1.5 CAPSULE ORAL
Qty: 0 | Refills: 0 | DISCHARGE
Start: 2017-09-02

## 2017-09-02 RX ORDER — OXYCODONE HYDROCHLORIDE 5 MG/1
5 TABLET ORAL EVERY 4 HOURS
Refills: 0 | Status: DISCONTINUED | OUTPATIENT
Start: 2017-09-02 | End: 2017-09-03

## 2017-09-02 RX ORDER — BENZOCAINE AND MENTHOL 5; 1 G/100ML; G/100ML
1 LIQUID ORAL
Refills: 0 | Status: DISCONTINUED | OUTPATIENT
Start: 2017-09-02 | End: 2017-09-03

## 2017-09-02 RX ORDER — HYDROXYCHLOROQUINE SULFATE 200 MG
400 TABLET ORAL DAILY
Refills: 0 | Status: DISCONTINUED | OUTPATIENT
Start: 2017-09-02 | End: 2017-09-03

## 2017-09-02 RX ORDER — VALSARTAN 80 MG/1
1 TABLET ORAL
Qty: 0 | Refills: 0 | DISCHARGE
Start: 2017-09-02

## 2017-09-02 RX ORDER — IBUPROFEN 200 MG
600 TABLET ORAL EVERY 6 HOURS
Refills: 0 | Status: DISCONTINUED | OUTPATIENT
Start: 2017-09-02 | End: 2017-09-03

## 2017-09-02 RX ADMIN — Medication 975 MILLIGRAM(S): at 02:08

## 2017-09-02 RX ADMIN — Medication 975 MILLIGRAM(S): at 02:38

## 2017-09-02 RX ADMIN — VALSARTAN 80 MILLIGRAM(S): 80 TABLET ORAL at 09:03

## 2017-09-02 RX ADMIN — SODIUM CHLORIDE 3 MILLILITER(S): 9 INJECTION INTRAMUSCULAR; INTRAVENOUS; SUBCUTANEOUS at 13:19

## 2017-09-02 RX ADMIN — HEPARIN SODIUM 5000 UNIT(S): 5000 INJECTION INTRAVENOUS; SUBCUTANEOUS at 22:19

## 2017-09-02 RX ADMIN — Medication 600 MILLIGRAM(S): at 19:48

## 2017-09-02 RX ADMIN — MYCOPHENOLATE MOFETIL 1500 MILLIGRAM(S): 250 CAPSULE ORAL at 20:32

## 2017-09-02 RX ADMIN — SODIUM CHLORIDE 3 MILLILITER(S): 9 INJECTION INTRAMUSCULAR; INTRAVENOUS; SUBCUTANEOUS at 22:20

## 2017-09-02 RX ADMIN — Medication 50 MILLIGRAM(S): at 16:54

## 2017-09-02 RX ADMIN — Medication 975 MILLIGRAM(S): at 09:02

## 2017-09-02 RX ADMIN — Medication 50 MILLIGRAM(S): at 09:03

## 2017-09-02 RX ADMIN — Medication 30 MILLIGRAM(S): at 02:38

## 2017-09-02 RX ADMIN — Medication 600 MILLIGRAM(S): at 18:53

## 2017-09-02 RX ADMIN — HEPARIN SODIUM 5000 UNIT(S): 5000 INJECTION INTRAVENOUS; SUBCUTANEOUS at 10:10

## 2017-09-02 RX ADMIN — HYDROMORPHONE HYDROCHLORIDE 30 MILLILITER(S): 2 INJECTION INTRAMUSCULAR; INTRAVENOUS; SUBCUTANEOUS at 07:12

## 2017-09-02 RX ADMIN — Medication 30 MILLIGRAM(S): at 02:08

## 2017-09-02 RX ADMIN — SODIUM CHLORIDE 125 MILLILITER(S): 9 INJECTION, SOLUTION INTRAVENOUS at 09:02

## 2017-09-02 RX ADMIN — Medication 400 MILLIGRAM(S): at 10:10

## 2017-09-02 RX ADMIN — Medication 20 MILLIGRAM(S): at 22:19

## 2017-09-02 RX ADMIN — MYCOPHENOLATE MOFETIL 1500 MILLIGRAM(S): 250 CAPSULE ORAL at 05:28

## 2017-09-02 RX ADMIN — Medication 975 MILLIGRAM(S): at 09:32

## 2017-09-02 NOTE — DISCHARGE NOTE ADULT - CARE PLAN
Principal Discharge DX:	Uterine fibroid  Goal:	Recovery back to baseline function and acitivity.  Instructions for follow-up, activity and diet:	Take Motrin and Tylenol as needed for moderate pain and cramping, oxycodone for severe pain.  Vaginal spotting for the next couple of days is normal.  If heavy vaginal bleeding, foul smelling discharge, fevers, or pain not controlled with oral pain meds, please contact your provider or go to the emergency room.  Nothing in the vagina for the next two weeks. Please call Dr. Contreras's office for a 2 week follow-up. Principal Discharge DX:	Uterine fibroid  Goal:	Recovery back to baseline function and acitivity.  Instructions for follow-up, activity and diet:	Take Motrin and Tylenol as needed for moderate pain and cramping as needed. Take Mylicon as needed for gas pain (available over the counter). Take colace as needed for constipation (available over the counter).  Vaginal spotting for the next couple of days is normal.  If heavy vaginal bleeding, foul smelling discharge, fevers, or pain not controlled with oral pain meds, please contact your provider or go to the emergency room.  Nothing in the vagina for the next two weeks. Please call Dr. Agee's office for a 2 week follow-up.

## 2017-09-02 NOTE — DISCHARGE NOTE ADULT - MEDICATION SUMMARY - MEDICATIONS TO TAKE
I will START or STAY ON the medications listed below when I get home from the hospital:    predniSONE  -- 10 mg by mouth once a day  -- Indication: For home med    ibuprofen 600 mg oral tablet  -- 1 tab(s) by mouth every 6 hours, As needed, moderate pain  -- Indication: For pain control    acetaminophen 325 mg oral tablet  -- 3 tab(s) by mouth every 6 hours  -- Indication: For pain control    valsartan 80 mg oral tablet  -- 1 tab(s) by mouth once a day  -- Indication: For home med    Plaquenil 200 mg oral tablet  -- 2 cap(s) by mouth once a day  -- Indication: For home med    mycophenolate mofetil 250 mg oral capsule  -- 1.5 gram(s) by mouth 2 times a day  -- Indication: For home med    ocular lubricant ophthalmic solution  -- 1 drop(s) to each affected eye 4 times a day, As needed, Dry Eyes  -- Indication: For dry eyes

## 2017-09-02 NOTE — DISCHARGE NOTE ADULT - CONDITIONS AT DISCHARGE
pt. resting v/s stable afebrile.  voiding, ambulating, tolerating diet, as per pt, +flatus.  no signs of acute distress

## 2017-09-02 NOTE — DISCHARGE NOTE ADULT - PATIENT PORTAL LINK FT
“You can access the [employer] Patient Portal, offered by [facility], by registering with the following website: [email or web address]”

## 2017-09-02 NOTE — DISCHARGE NOTE ADULT - CARE PROVIDER_API CALL
Matt Agee (MD), Obstetrics and Gynecology  [address] [place] [address]  Phone: [phone]  Fax: [phone]

## 2017-09-02 NOTE — DISCHARGE NOTE ADULT - HOSPITAL COURSE
43yo s/p ELE, RSO, LS 2/2 degenerating fibroid on 09/02/2017.  EBL: 250, hct 32.2 -> postop -> ***.  POD#0, patient was advanced to regular diet. POD#1, jordan was discontinued and patient voided spontaneously.  POD# *** , patient was discharged in stable condition, ambulating, tolerating PO, and voiding spontaneously.  Patient will have close follow up with Dr. Dodson in office. 41yo s/p ELE, RSO, LS 2/2 degenerating fibroid on 09/02/2017.  EBL: 250, hct 32.2 -> 26.0  POD#0, patient was advanced to regular diet. POD#1, jordan was discontinued and patient voided spontaneously.  POD# 2 , patient was discharged in stable condition, ambulating, tolerating PO, passing gas and voiding spontaneously.  Patient will have close follow up with Dr. Agee in office in 2 weeks for a cuff check.

## 2017-09-02 NOTE — DISCHARGE NOTE ADULT - INSTRUCTIONS
Notify MD of fever, chills, nausea, vomiting, pain unrelieved by pain meds, difficulty urinating, inability to tolerate food, discharge from incision site

## 2017-09-02 NOTE — DISCHARGE NOTE ADULT - PLAN OF CARE
Recovery back to baseline function and acitivity. Take Motrin and Tylenol as needed for moderate pain and cramping, oxycodone for severe pain.  Vaginal spotting for the next couple of days is normal.  If heavy vaginal bleeding, foul smelling discharge, fevers, or pain not controlled with oral pain meds, please contact your provider or go to the emergency room.  Nothing in the vagina for the next two weeks. Please call Dr. Contreras's office for a 2 week follow-up. Take Motrin and Tylenol as needed for moderate pain and cramping as needed. Take Mylicon as needed for gas pain (available over the counter). Take colace as needed for constipation (available over the counter).  Vaginal spotting for the next couple of days is normal.  If heavy vaginal bleeding, foul smelling discharge, fevers, or pain not controlled with oral pain meds, please contact your provider or go to the emergency room.  Nothing in the vagina for the next two weeks. Please call Dr. Agee's office for a 2 week follow-up.

## 2017-09-02 NOTE — PROGRESS NOTE ADULT - PROBLEM SELECTOR PLAN 1
Neuro: Transition to oral pain medications   CV: Hemodynamically stable  Pulm: Saturating well on RA. Increase incentive spirometry, out of bed, and ambulation as tolerated. Mucinex for congestion.   GI: Continue regular diet as tolerated  : CHANELLE Sims by 3PM. adequate UOP  Heme: Continue HSQ/Venodynes for DVT ppx. Increase OOB and ambulation. No more labs required  Rika Romo PGY2 [id number]

## 2017-09-03 VITALS
SYSTOLIC BLOOD PRESSURE: 112 MMHG | RESPIRATION RATE: 18 BRPM | OXYGEN SATURATION: 100 % | TEMPERATURE: 98 F | DIASTOLIC BLOOD PRESSURE: 71 MMHG | HEART RATE: 75 BPM

## 2017-09-03 RX ORDER — ACETAMINOPHEN 500 MG
3 TABLET ORAL
Qty: 0 | Refills: 0 | DISCHARGE
Start: 2017-09-03

## 2017-09-03 RX ORDER — IBUPROFEN 200 MG
1 TABLET ORAL
Qty: 0 | Refills: 0 | DISCHARGE
Start: 2017-09-03

## 2017-09-03 RX ADMIN — HEPARIN SODIUM 5000 UNIT(S): 5000 INJECTION INTRAVENOUS; SUBCUTANEOUS at 11:00

## 2017-09-03 RX ADMIN — Medication 20 MILLIGRAM(S): at 17:05

## 2017-09-03 RX ADMIN — MYCOPHENOLATE MOFETIL 1500 MILLIGRAM(S): 250 CAPSULE ORAL at 06:09

## 2017-09-03 RX ADMIN — Medication 400 MILLIGRAM(S): at 08:19

## 2017-09-03 RX ADMIN — VALSARTAN 80 MILLIGRAM(S): 80 TABLET ORAL at 08:14

## 2017-09-03 RX ADMIN — SODIUM CHLORIDE 3 MILLILITER(S): 9 INJECTION INTRAMUSCULAR; INTRAVENOUS; SUBCUTANEOUS at 06:15

## 2017-09-03 RX ADMIN — Medication 20 MILLIGRAM(S): at 08:14

## 2017-09-03 NOTE — PROGRESS NOTE ADULT - SUBJECTIVE AND OBJECTIVE BOX
No complaints feels fine  VS stable afebrile   abdomen soft non tender   wound clean dry intact
ANESTHESIA POSTOP CHECK    42y Female POSTOP DAY 1 S/P     [ X] NO APPARENT ANESTHESIA COMPLICATIONS      Comments:
Anesthesia Pain Management Service    SUBJECTIVE: Patient is doing well with IV PCA and no significant problems reported.    Pain Scale Score	At rest: ___ 	With Activity: ___ 	[X ] Refer to charted pain scores    THERAPY:    [ ] IV PCA Morphine		[ ] 5 mg/mL	[ ] 1 mg/mL  [X ] IV PCA Hydromorphone	[ ] 5 mg/mL	[X ] 1 mg/mL  [ ] IV PCA Fentanyl		[ ] 50 micrograms/mL    Demand dose __0.2_ lockout __6_ (minutes) Continuous Rate _0__ Total:4.6mg ___  Daily      MEDICATIONS  (STANDING):  lactated ringers. 1000 milliLiter(s) (125 mL/Hr) IV Continuous <Continuous>  heparin  Injectable 5000 Unit(s) SubCutaneous every 12 hours  lactated ringers. 1000 milliLiter(s) (125 mL/Hr) IV Continuous <Continuous>  acetaminophen   Tablet. 975 milliGRAM(s) Oral every 6 hours  valsartan 80 milliGRAM(s) Oral daily  mycophenolate mofetil 1500 milliGRAM(s) Oral two times a day  predniSONE   Tablet 50  Oral   predniSONE   Tablet 50 milliGRAM(s) Oral every 8 hours  predniSONE   Tablet 20 milliGRAM(s) Oral every 8 hours    MEDICATIONS  (PRN):  naloxone Injectable 0.1 milliGRAM(s) IV Push every 3 minutes PRN For ANY of the following changes in patient status:  A. RR LESS THAN 10 breaths per minute, B. Oxygen saturation LESS THAN 90%, C. Sedation score of 6  ondansetron Injectable 4 milliGRAM(s) IV Push every 6 hours PRN Nausea  diphenhydrAMINE   Injectable 50 milliGRAM(s) IV Push every 4 hours PRN Pruritus  ibuprofen  Tablet 600 milliGRAM(s) Oral every 6 hours PRN moderate pain  oxyCODONE    IR 5 milliGRAM(s) Oral every 4 hours PRN Severe Pain (7 - 10)  guaiFENesin  milliGRAM(s) Oral every 12 hours PRN Mucus  benzocaine 15 mG/menthol 3.6 mG Lozenge 1 Lozenge Oral four times a day PRN Sore Throat      OBJECTIVE:    Sedation Score:	[ X] Alert	[ ] Drowsy 	[ ] Arousable	[ ] Asleep	[ ] Unresponsive    Side Effects:	[X ] None	[ ] Nausea	[ ] Vomiting	[ ] Pruritus  		[ ] Other:    Vital Signs Last 24 Hrs  T(C): 36.3 (02 Sep 2017 05:38), Max: 37.8 (01 Sep 2017 10:49)  T(F): 97.4 (02 Sep 2017 05:38), Max: 100 (01 Sep 2017 10:49)  HR: 77 (02 Sep 2017 05:38) (77 - 119)  BP: 132/71 (02 Sep 2017 05:38) (116/68 - 154/79)  BP(mean): 89 (01 Sep 2017 15:30) (89 - 89)  RR: 17 (02 Sep 2017 05:38) (12 - 20)  SpO2: 100% (02 Sep 2017 05:38) (97% - 100%)    ASSESSMENT/ PLAN    Therapy to  be:	[ ] Continue   [ X] Discontinued   [X ] Change to prn Analgesics    Documentation and Verification of current medications:   [X] Done	[ ] Not done, not elligible    Comments: PRN Oral/IV opioids and/or Adjuvant medication to be ordered at this point.
Gyn Progress Note POD # 2    Subjective:     Pt seen and examined at bedside. No events overnight. Pain well controlled. Patient ambulating. Not yet passing flatus. Tolerating regular diet. Pt denies fever, chills, chest pain, SOB, nausea, vomiting, lightheadedness, dizziness.      Objective:  T(F): 97.8 (17 @ 06:13), Max: 98.6 (17 @ 10:00)  HR: 69 (17 @ 06:13) (69 - 86)  BP: 128/79 (17 @ 06:13) (108/67 - 133/81)  RR: 18 (17 @ 06:13) (17 - 18)  SpO2: 100% (17 @ 06:13) (98% - 100%)  I&O's Summary    01 Sep 2017 07:  -  02 Sep 2017 07:00  --------------------------------------------------------  IN: 1075 mL / OUT: 2910 mL / NET: -1835 mL    02 Sep 2017 07:  -  03 Sep 2017 06:51  --------------------------------------------------------  IN: 0 mL / OUT: 1310 mL / NET: -1310 mL              MEDICATIONS  (STANDING):  heparin  Injectable 5000 Unit(s) SubCutaneous every 12 hours  acetaminophen   Tablet. 975 milliGRAM(s) Oral every 6 hours  valsartan 80 milliGRAM(s) Oral daily  mycophenolate mofetil 1500 milliGRAM(s) Oral two times a day  predniSONE   Tablet 50  Oral   predniSONE   Tablet 20 milliGRAM(s) Oral every 8 hours  predniSONE   Tablet 10 milliGRAM(s) Oral every 8 hours  sodium chloride 0.9% lock flush 3 milliLiter(s) IV Push every 8 hours  hydroxychloroquine 400 milliGRAM(s) Oral daily    MEDICATIONS  (PRN):  naloxone Injectable 0.1 milliGRAM(s) IV Push every 3 minutes PRN For ANY of the following changes in patient status:  A. RR LESS THAN 10 breaths per minute, B. Oxygen saturation LESS THAN 90%, C. Sedation score of 6  ondansetron Injectable 4 milliGRAM(s) IV Push every 6 hours PRN Nausea  diphenhydrAMINE   Injectable 50 milliGRAM(s) IV Push every 4 hours PRN Pruritus  ibuprofen  Tablet 600 milliGRAM(s) Oral every 6 hours PRN moderate pain  oxyCODONE    IR 5 milliGRAM(s) Oral every 4 hours PRN Severe Pain (7 - 10)  guaiFENesin  milliGRAM(s) Oral every 12 hours PRN Mucus  benzocaine 15 mG/menthol 3.6 mG Lozenge 1 Lozenge Oral four times a day PRN Sore Throat      Physical Exam:  Constitutional: NAD, A+O x3  CV: RRR  Lungs: clear to auscultation bilaterally  Abdomen: soft, + bowel sounds, appropriately tender, softly distended, no rebound or guarding  Incision: vertical infraumbilical incision with steri strips clean, dry, intact  Extremities: no lower extremity edema or calf tenderness bilaterally, venodynes in place    Labs:                        8.7    14.13 )-----------( 256      ( 02 Sep 2017 04:47 )             26.0   baso 0.0    eos 0.0    imm gran 0.8    lymph 5.2    mono 5.6    poly 88.4                         10.7   17.24 )-----------( 264      ( 01 Sep 2017 08:00 )             32.2   baso 0.2    eos 0.1    imm gran 0.8    lymph 7.5    mono 12.8   poly 78.6               PT/INR - ( 01 Sep 2017 08:00 )   PT: 12.7 SEC;   INR: 1.13          PTT - ( 01 Sep 2017 08:00 )  PTT:31.6 SEC  Urinalysis Basic - ( 01 Sep 2017 08:00 )    Color: YELLOW / Appearance: CLEAR / S.009 / pH: 7.0  Gluc: NEGATIVE / Ketone: NEGATIVE  / Bili: NEGATIVE / Urobili: NORMAL E.U.   Blood: MODERATE / Protein: 500 / Nitrite: NEGATIVE   Leuk Esterase: NEGATIVE / RBC: 5-10 / WBC 2-5   Sq Epi: FEW / Non Sq Epi: x / Bacteria: FEW
41yo Lupus s/p ELE, RSO, LS for 19cm degenerating fibroid s/p Lupron.  HD#1 POD#0    Patient seen and examined at bedside, recently post-op. No acute complaints at this time. Denies CP, SOB, N/V, fevers, and chills.  Tolerating ice chips well.      Vital Signs Last 24 Hours  T(C): 36.5 (09-01-17 @ 18:00), Max: 39.2 (09-01-17 @ 07:38)  HR: 78 (09-01-17 @ 18:00) (78 - 119)  BP: 116/68 (09-01-17 @ 18:00) (114/72 - 154/79)  RR: 18 (09-01-17 @ 18:00) (12 - 20)  SpO2: 99% (09-01-17 @ 18:00) (97% - 100%)    I&O's Summary    01 Sep 2017 07:01  -  01 Sep 2017 18:32  --------------------------------------------------------  IN: 75 mL / OUT: 275 mL / NET: -200 mL        Physical Exam:  General: NAD  CV: NR, RR, S1, S2, no M/R/G  Lungs: CTA-B  Abdomen: Soft, non-tender, non-distended, +BS  Incision: bandage over incision, midline from umbilicus to above mons pubis, is clean, dry and not bleeding.  Ext: No pain or swelling, SCDs in place.    Labs:             10.7   17.24 )-----------( 264      ( 09-01 @ 08:00 )             32.2                11.4   17.28 )-----------( 210      ( 08-29 @ 09:35 )             34.6         MEDICATIONS  (STANDING):  lactated ringers. 1000 milliLiter(s) (125 mL/Hr) IV Continuous <Continuous>  HYDROmorphone PCA (1 mG/mL) 30 milliLiter(s) PCA Continuous PCA Continuous  heparin  Injectable 5000 Unit(s) SubCutaneous every 12 hours  lactated ringers. 1000 milliLiter(s) (125 mL/Hr) IV Continuous <Continuous>  acetaminophen   Tablet. 975 milliGRAM(s) Oral every 6 hours  ketorolac   Injectable 30 milliGRAM(s) IV Push every 6 hours  mycophenolate mofetil 3000 milliGRAM(s) Oral once  hydroxychloroquine 400 milliGRAM(s) Oral once  valsartan 80 milliGRAM(s) Oral daily    MEDICATIONS  (PRN):  HYDROmorphone  Injectable 1 milliGRAM(s) IV Push every 10 minutes PRN Moderate Pain (4 - 6)  HYDROmorphone PCA (1 mG/mL) Rescue Clinician Bolus 0.5 milliGRAM(s) IV Push every 15 minutes PRN for Pain Scale GREATER THAN 6  naloxone Injectable 0.1 milliGRAM(s) IV Push every 3 minutes PRN For ANY of the following changes in patient status:  A. RR LESS THAN 10 breaths per minute, B. Oxygen saturation LESS THAN 90%, C. Sedation score of 6  ondansetron Injectable 4 milliGRAM(s) IV Push every 6 hours PRN Nausea  diphenhydrAMINE   Injectable 50 milliGRAM(s) IV Push every 4 hours PRN Pruritus
Gyn Progress Note POD # 1    Subjective:     Pt seen and examined at bedside. No events overnight. Pain well controlled; she has not been using PCA since last night. Complaining of feeling mucus in her chest, requesting mucinex. She has not yet been out of bed. She is not yet passing flatus. Tolerating regular diet. Pt denies fever, chills, chest pain, SOB, nausea, vomiting, lightheadedness, dizziness.      Objective:  T(F): 97.4 (17 @ 05:38), Max: 102.5 (17 @ 07:38)  HR: 77 (17 @ 05:38) (77 - 119)  BP: 132/71 (17 @ 05:38) (114/72 - 154/79)  RR: 17 (17 @ 05:38) (12 - 20)  SpO2: 100% (17 @ 05:38) (97% - 100%)  I&O's Summary    01 Sep 2017 07:01  -  02 Sep 2017 07:00  --------------------------------------------------------  IN: 1075 mL / OUT: 2910 mL / NET: -1835 mL      CAPILLARY BLOOD GLUCOSE  118 (01 Sep 2017 10:25)          MEDICATIONS  (STANDING):  lactated ringers. 1000 milliLiter(s) (125 mL/Hr) IV Continuous <Continuous>  HYDROmorphone PCA (1 mG/mL) 30 milliLiter(s) PCA Continuous PCA Continuous  heparin  Injectable 5000 Unit(s) SubCutaneous every 12 hours  lactated ringers. 1000 milliLiter(s) (125 mL/Hr) IV Continuous <Continuous>  acetaminophen   Tablet. 975 milliGRAM(s) Oral every 6 hours  ketorolac   Injectable 30 milliGRAM(s) IV Push every 6 hours  valsartan 80 milliGRAM(s) Oral daily  mycophenolate mofetil 1500 milliGRAM(s) Oral two times a day  predniSONE   Tablet 50  Oral   predniSONE   Tablet 50 milliGRAM(s) Oral every 8 hours  predniSONE   Tablet 20 milliGRAM(s) Oral every 8 hours    MEDICATIONS  (PRN):  HYDROmorphone  Injectable 1 milliGRAM(s) IV Push every 10 minutes PRN Moderate Pain (4 - 6)  HYDROmorphone PCA (1 mG/mL) Rescue Clinician Bolus 0.5 milliGRAM(s) IV Push every 15 minutes PRN for Pain Scale GREATER THAN 6  naloxone Injectable 0.1 milliGRAM(s) IV Push every 3 minutes PRN For ANY of the following changes in patient status:  A. RR LESS THAN 10 breaths per minute, B. Oxygen saturation LESS THAN 90%, C. Sedation score of 6  ondansetron Injectable 4 milliGRAM(s) IV Push every 6 hours PRN Nausea  diphenhydrAMINE   Injectable 50 milliGRAM(s) IV Push every 4 hours PRN Pruritus      Physical Exam:  Constitutional: NAD, A+O x3  CV: RRR  Lungs: clear to auscultation bilaterally  Abdomen: soft, + bowel sounds, appropriately tender, softly distended, no rebound or guarding  Incision: vertical with op-site clean, dry, intact  Extremities: no lower extremity edema or calf tenderness bilaterally, venodynes in place    Labs:                        8.7    14.13 )-----------( 256      ( 02 Sep 2017 04:47 )             26.0   baso 0.0    eos 0.0    imm gran 0.8    lymph 5.2    mono 5.6    poly 88.4                         10.7   17.24 )-----------( 264      ( 01 Sep 2017 08:00 )             32.2   baso 0.2    eos 0.1    imm gran 0.8    lymph 7.5    mono 12.8   poly 78.6         136    |  99     |  6<L>   ----------------------------<  123<H>  3.8     |  20<L>  |  0.74     Ca    8.5        01 Sep 2017 08:00    TPro  7.4    /  Alb  2.7<L>  /  TBili  0.4    /  DBili  x      /  AST  21     /  ALT  10     /  AlkPhos  73             PT/INR - ( 01 Sep 2017 08:00 )   PT: 12.7 SEC;   INR: 1.13          PTT - ( 01 Sep 2017 08:00 )  PTT:31.6 SEC  Urinalysis Basic - ( 01 Sep 2017 08:00 )    Color: YELLOW / Appearance: CLEAR / S.009 / pH: 7.0  Gluc: NEGATIVE / Ketone: NEGATIVE  / Bili: NEGATIVE / Urobili: NORMAL E.U.   Blood: MODERATE / Protein: 500 / Nitrite: NEGATIVE   Leuk Esterase: NEGATIVE / RBC: 5-10 / WBC 2-5   Sq Epi: FEW / Non Sq Epi: x / Bacteria: FEW

## 2017-09-03 NOTE — PROGRESS NOTE ADULT - ATTENDING COMMENTS
Pt seen and examined.  Discussed surgery in detail at patient request.  Pt out of bed, tolerating po, pain well controlled, no flatus.  Vitals stable.  Pt for discharge after passing flatus.  Will follow up in two weeks with Dr. Agee for cuff check.  Discharge instructions reviewed including pain control, no heavy lifting and nothing per vagina.    Sandhya BLANCO

## 2017-09-03 NOTE — PROGRESS NOTE ADULT - ASSESSMENT
43yo w Lupus s/p ELE, RSO, LS for 19cm degenerating fibroid s/p Lupron.  HD#1 POD#0  Stable.
Assessment/Plan: 42y female POD# 1, s/p ELE, RSO, LS for 19cm degenerating fibroid doing well postoperatively
Post op day 2 Hysterectomy doing well  possible d/c today or tomorrow
Assessment/Plan: 42y female POD# 2, s/p ELE RSO, LS for 19cm degenerating fibroid doing well

## 2017-09-03 NOTE — PROGRESS NOTE ADULT - PROBLEM SELECTOR PLAN 1
Neuro: Continue oral meds for pain control  CV: Hemodynamically stable  Pulm: Saturating well on RA. Increase incentive spirometry, out of bed, and ambulation as tolerated.  GI: Continue regular diet as tolerated  : Voiding spontaneously  Heme: Continue HSQ/Venodynes for DVT ppx. Increase OOB and ambulation.   Dispo: Eval for dc  Rika Romo PGY2 [id number]

## 2017-09-06 LAB
BACTERIA BLD CULT: SIGNIFICANT CHANGE UP
BACTERIA BLD CULT: SIGNIFICANT CHANGE UP

## 2017-09-14 ENCOUNTER — APPOINTMENT (OUTPATIENT)
Dept: OBGYN | Facility: CLINIC | Age: 42
End: 2017-09-14
Payer: COMMERCIAL

## 2017-09-14 ENCOUNTER — LABORATORY RESULT (OUTPATIENT)
Age: 42
End: 2017-09-14

## 2017-09-14 PROCEDURE — 99024 POSTOP FOLLOW-UP VISIT: CPT

## 2017-09-14 PROCEDURE — 36415 COLL VENOUS BLD VENIPUNCTURE: CPT

## 2017-09-15 ENCOUNTER — MOBILE ON CALL (OUTPATIENT)
Age: 42
End: 2017-09-15

## 2017-09-15 ENCOUNTER — INPATIENT (INPATIENT)
Facility: HOSPITAL | Age: 42
LOS: 4 days | Discharge: HOME | End: 2017-09-20
Attending: OBSTETRICS & GYNECOLOGY | Admitting: OBSTETRICS & GYNECOLOGY
Payer: COMMERCIAL

## 2017-09-15 VITALS
DIASTOLIC BLOOD PRESSURE: 54 MMHG | HEART RATE: 124 BPM | SYSTOLIC BLOOD PRESSURE: 111 MMHG | OXYGEN SATURATION: 100 % | RESPIRATION RATE: 20 BRPM | TEMPERATURE: 102 F

## 2017-09-15 DIAGNOSIS — A41.9 SEPSIS, UNSPECIFIED ORGANISM: ICD-10-CM

## 2017-09-15 LAB
ALBUMIN SERPL ELPH-MCNC: 3 G/DL — LOW (ref 3.3–5)
ALP SERPL-CCNC: 118 U/L — SIGNIFICANT CHANGE UP (ref 40–120)
ALT FLD-CCNC: 12 U/L — SIGNIFICANT CHANGE UP (ref 4–33)
APPEARANCE UR: CLEAR — SIGNIFICANT CHANGE UP
AST SERPL-CCNC: 15 U/L — SIGNIFICANT CHANGE UP (ref 4–32)
BASE EXCESS BLDV CALC-SCNC: 0.6 MMOL/L — SIGNIFICANT CHANGE UP
BASOPHILS # BLD AUTO: 0 K/UL
BASOPHILS # BLD AUTO: 0.03 K/UL — SIGNIFICANT CHANGE UP (ref 0–0.2)
BASOPHILS NFR BLD AUTO: 0 %
BASOPHILS NFR BLD AUTO: 0.1 % — SIGNIFICANT CHANGE UP (ref 0–2)
BILIRUB SERPL-MCNC: 0.5 MG/DL — SIGNIFICANT CHANGE UP (ref 0.2–1.2)
BILIRUB UR-MCNC: NEGATIVE — SIGNIFICANT CHANGE UP
BLOOD GAS VENOUS - CREATININE: 0.56 MG/DL — SIGNIFICANT CHANGE UP (ref 0.5–1.3)
BLOOD UR QL VISUAL: HIGH
BUN SERPL-MCNC: 7 MG/DL — SIGNIFICANT CHANGE UP (ref 7–23)
CALCIUM SERPL-MCNC: 9.7 MG/DL — SIGNIFICANT CHANGE UP (ref 8.4–10.5)
CHLORIDE BLDV-SCNC: 102 MMOL/L — SIGNIFICANT CHANGE UP (ref 96–108)
CHLORIDE SERPL-SCNC: 97 MMOL/L — LOW (ref 98–107)
CO2 SERPL-SCNC: 22 MMOL/L — SIGNIFICANT CHANGE UP (ref 22–31)
COLOR SPEC: SIGNIFICANT CHANGE UP
CREAT SERPL-MCNC: 0.66 MG/DL — SIGNIFICANT CHANGE UP (ref 0.5–1.3)
EOSINOPHIL # BLD AUTO: 0 K/UL
EOSINOPHIL # BLD AUTO: 0 K/UL — SIGNIFICANT CHANGE UP (ref 0–0.5)
EOSINOPHIL NFR BLD AUTO: 0 %
EOSINOPHIL NFR BLD AUTO: 0 % — SIGNIFICANT CHANGE UP (ref 0–6)
GAS PNL BLDV: 136 MMOL/L — SIGNIFICANT CHANGE UP (ref 136–146)
GLUCOSE BLDV-MCNC: 106 — HIGH (ref 70–99)
GLUCOSE SERPL-MCNC: 105 MG/DL — HIGH (ref 70–99)
GLUCOSE UR-MCNC: NEGATIVE — SIGNIFICANT CHANGE UP
HCO3 BLDV-SCNC: 23 MMOL/L — SIGNIFICANT CHANGE UP (ref 20–27)
HCT VFR BLD CALC: 31.2 %
HCT VFR BLD CALC: 32.6 % — LOW (ref 34.5–45)
HCT VFR BLDV CALC: 35.2 % — SIGNIFICANT CHANGE UP (ref 34.5–45)
HGB BLD-MCNC: 10 G/DL
HGB BLD-MCNC: 10.5 G/DL — LOW (ref 11.5–15.5)
HGB BLDV-MCNC: 11.4 G/DL — LOW (ref 11.5–15.5)
IMM GRANULOCYTES # BLD AUTO: 0.18 # — SIGNIFICANT CHANGE UP
IMM GRANULOCYTES NFR BLD AUTO: 0.8 % — SIGNIFICANT CHANGE UP (ref 0–1.5)
KETONES UR-MCNC: NEGATIVE — SIGNIFICANT CHANGE UP
LACTATE BLDV-MCNC: 2.7 MMOL/L — HIGH (ref 0.5–2)
LACTATE SERPL-SCNC: 1.4 MMOL/L — SIGNIFICANT CHANGE UP (ref 0.5–2)
LEUKOCYTE ESTERASE UR-ACNC: HIGH
LYMPHOCYTES # BLD AUTO: 0.44 K/UL
LYMPHOCYTES # BLD AUTO: 1.11 K/UL — SIGNIFICANT CHANGE UP (ref 1–3.3)
LYMPHOCYTES # BLD AUTO: 5 % — LOW (ref 13–44)
LYMPHOCYTES NFR BLD AUTO: 1.8 %
MAN DIFF?: NORMAL
MCHC RBC-ENTMCNC: 27.5 PG — SIGNIFICANT CHANGE UP (ref 27–34)
MCHC RBC-ENTMCNC: 27.7 PG
MCHC RBC-ENTMCNC: 32.1 GM/DL
MCHC RBC-ENTMCNC: 32.2 % — SIGNIFICANT CHANGE UP (ref 32–36)
MCV RBC AUTO: 85.3 FL — SIGNIFICANT CHANGE UP (ref 80–100)
MCV RBC AUTO: 86.4 FL
MONOCYTES # BLD AUTO: 1.29 K/UL
MONOCYTES # BLD AUTO: 1.94 K/UL — HIGH (ref 0–0.9)
MONOCYTES NFR BLD AUTO: 5.3 %
MONOCYTES NFR BLD AUTO: 8.8 % — SIGNIFICANT CHANGE UP (ref 2–14)
MUCOUS THREADS # UR AUTO: SIGNIFICANT CHANGE UP
NEUTROPHILS # BLD AUTO: 18.73 K/UL — HIGH (ref 1.8–7.4)
NEUTROPHILS # BLD AUTO: 22.54 K/UL
NEUTROPHILS NFR BLD AUTO: 85.3 % — HIGH (ref 43–77)
NEUTROPHILS NFR BLD AUTO: 92.9 %
NITRITE UR-MCNC: NEGATIVE — SIGNIFICANT CHANGE UP
NRBC # FLD: 0 — SIGNIFICANT CHANGE UP
PCO2 BLDV: 48 MMHG — SIGNIFICANT CHANGE UP (ref 41–51)
PH BLDV: 7.35 PH — SIGNIFICANT CHANGE UP (ref 7.32–7.43)
PH UR: 7 — SIGNIFICANT CHANGE UP (ref 4.6–8)
PLATELET # BLD AUTO: 350 K/UL — SIGNIFICANT CHANGE UP (ref 150–400)
PLATELET # BLD AUTO: 382 K/UL
PMV BLD: 10.5 FL — SIGNIFICANT CHANGE UP (ref 7–13)
PO2 BLDV: < 24 MMHG — LOW (ref 35–40)
POTASSIUM BLDV-SCNC: 3.8 MMOL/L — SIGNIFICANT CHANGE UP (ref 3.4–4.5)
POTASSIUM SERPL-MCNC: 4 MMOL/L — SIGNIFICANT CHANGE UP (ref 3.5–5.3)
POTASSIUM SERPL-SCNC: 4 MMOL/L — SIGNIFICANT CHANGE UP (ref 3.5–5.3)
PROT SERPL-MCNC: 7.4 G/DL — SIGNIFICANT CHANGE UP (ref 6–8.3)
PROT UR-MCNC: 150 — HIGH
RBC # BLD: 3.61 M/UL
RBC # BLD: 3.82 M/UL — SIGNIFICANT CHANGE UP (ref 3.8–5.2)
RBC # FLD: 13.5 % — SIGNIFICANT CHANGE UP (ref 10.3–14.5)
RBC # FLD: 14.2 %
RBC CASTS # UR COMP ASSIST: SIGNIFICANT CHANGE UP (ref 0–?)
SAO2 % BLDV: 28.8 % — LOW (ref 60–85)
SODIUM SERPL-SCNC: 137 MMOL/L — SIGNIFICANT CHANGE UP (ref 135–145)
SP GR SPEC: 1.01 — SIGNIFICANT CHANGE UP (ref 1–1.03)
SQUAMOUS # UR AUTO: SIGNIFICANT CHANGE UP
UROBILINOGEN FLD QL: NORMAL E.U. — SIGNIFICANT CHANGE UP (ref 0.1–0.2)
WBC # BLD: 21.99 K/UL — HIGH (ref 3.8–10.5)
WBC # FLD AUTO: 21.99 K/UL — HIGH (ref 3.8–10.5)
WBC # FLD AUTO: 24.26 K/UL
WBC UR QL: SIGNIFICANT CHANGE UP (ref 0–?)

## 2017-09-15 PROCEDURE — 74177 CT ABD & PELVIS W/CONTRAST: CPT | Mod: 26

## 2017-09-15 RX ORDER — ACETAMINOPHEN 500 MG
1000 TABLET ORAL ONCE
Refills: 0 | Status: COMPLETED | OUTPATIENT
Start: 2017-09-15 | End: 2017-09-15

## 2017-09-15 RX ORDER — PIPERACILLIN AND TAZOBACTAM 4; .5 G/20ML; G/20ML
3.38 INJECTION, POWDER, LYOPHILIZED, FOR SOLUTION INTRAVENOUS ONCE
Refills: 0 | Status: COMPLETED | OUTPATIENT
Start: 2017-09-15 | End: 2017-09-15

## 2017-09-15 RX ORDER — HYDROXYCHLOROQUINE SULFATE 200 MG
200 TABLET ORAL DAILY
Refills: 0 | Status: DISCONTINUED | OUTPATIENT
Start: 2017-09-15 | End: 2017-09-19

## 2017-09-15 RX ORDER — MYCOPHENOLATE MOFETIL 250 MG/1
1500 CAPSULE ORAL
Refills: 0 | Status: DISCONTINUED | OUTPATIENT
Start: 2017-09-15 | End: 2017-09-20

## 2017-09-15 RX ORDER — SODIUM CHLORIDE 9 MG/ML
2000 INJECTION INTRAMUSCULAR; INTRAVENOUS; SUBCUTANEOUS ONCE
Refills: 0 | Status: COMPLETED | OUTPATIENT
Start: 2017-09-15 | End: 2017-09-15

## 2017-09-15 RX ORDER — SODIUM CHLORIDE 9 MG/ML
1000 INJECTION INTRAMUSCULAR; INTRAVENOUS; SUBCUTANEOUS ONCE
Refills: 0 | Status: COMPLETED | OUTPATIENT
Start: 2017-09-15 | End: 2017-09-15

## 2017-09-15 RX ORDER — SODIUM CHLORIDE 9 MG/ML
1000 INJECTION, SOLUTION INTRAVENOUS
Refills: 0 | Status: DISCONTINUED | OUTPATIENT
Start: 2017-09-15 | End: 2017-09-17

## 2017-09-15 RX ORDER — PIPERACILLIN AND TAZOBACTAM 4; .5 G/20ML; G/20ML
3.38 INJECTION, POWDER, LYOPHILIZED, FOR SOLUTION INTRAVENOUS EVERY 8 HOURS
Refills: 0 | Status: DISCONTINUED | OUTPATIENT
Start: 2017-09-15 | End: 2017-09-19

## 2017-09-15 RX ORDER — HEPARIN SODIUM 5000 [USP'U]/ML
5000 INJECTION INTRAVENOUS; SUBCUTANEOUS EVERY 12 HOURS
Refills: 0 | Status: DISCONTINUED | OUTPATIENT
Start: 2017-09-15 | End: 2017-09-20

## 2017-09-15 RX ORDER — HYDROCORTISONE 20 MG
100 TABLET ORAL ONCE
Refills: 0 | Status: COMPLETED | OUTPATIENT
Start: 2017-09-15 | End: 2017-09-15

## 2017-09-15 RX ORDER — VALSARTAN 80 MG/1
80 TABLET ORAL DAILY
Refills: 0 | Status: DISCONTINUED | OUTPATIENT
Start: 2017-09-15 | End: 2017-09-20

## 2017-09-15 RX ORDER — SODIUM CHLORIDE 9 MG/ML
1000 INJECTION, SOLUTION INTRAVENOUS
Refills: 0 | Status: DISCONTINUED | OUTPATIENT
Start: 2017-09-15 | End: 2017-09-15

## 2017-09-15 RX ORDER — VANCOMYCIN HCL 1 G
1000 VIAL (EA) INTRAVENOUS ONCE
Refills: 0 | Status: COMPLETED | OUTPATIENT
Start: 2017-09-15 | End: 2017-09-15

## 2017-09-15 RX ADMIN — Medication 400 MILLIGRAM(S): at 17:22

## 2017-09-15 RX ADMIN — SODIUM CHLORIDE 125 MILLILITER(S): 9 INJECTION, SOLUTION INTRAVENOUS at 20:45

## 2017-09-15 RX ADMIN — PIPERACILLIN AND TAZOBACTAM 200 GRAM(S): 4; .5 INJECTION, POWDER, LYOPHILIZED, FOR SOLUTION INTRAVENOUS at 17:31

## 2017-09-15 RX ADMIN — Medication 100 MILLIGRAM(S): at 17:21

## 2017-09-15 RX ADMIN — SODIUM CHLORIDE 2000 MILLILITER(S): 9 INJECTION INTRAMUSCULAR; INTRAVENOUS; SUBCUTANEOUS at 17:22

## 2017-09-15 RX ADMIN — Medication 250 MILLIGRAM(S): at 18:32

## 2017-09-15 RX ADMIN — SODIUM CHLORIDE 1000 MILLILITER(S): 9 INJECTION INTRAMUSCULAR; INTRAVENOUS; SUBCUTANEOUS at 18:37

## 2017-09-15 NOTE — ED ADULT NURSE NOTE - OBJECTIVE STATEMENT
42F with hx of Lupus Nephritis on chronic steroids presents with fever. pt is s/p hysterectomy 13 days ago, now with fever and pelvic pain x3 days. pt saw her ob-gyn yesterday and abx sent to her pharmacy, pt isnt have the chance to pick them up because was feeling to unwell. (+) fever and chills, denies dysuria or urinary frequency, just pressure. (+) myalgias. no abd pain nausea or vomiting.

## 2017-09-15 NOTE — H&P ADULT - PROBLEM SELECTOR PLAN 1
-admit to GYN service  -IVF hydration  -Vanc/Zosyn  -Strict I&O, monitor VS  -Regular diet  -HSQ for DVT ppx  -c/w home medications for lupus nephritis    Pt seen and examined with Dr. Contreras

## 2017-09-15 NOTE — ED PROVIDER NOTE - ATTENDING CONTRIBUTION TO CARE
Dr. Tsai:  I have personally performed a face to face bedside history and physical examination of this patient. I have discussed the history, examination, review of systems, assessment and plan of management with the resident. I have reviewed the electronic medical record and amended it to reflect my history, review of systems, physical exam, assessment and plan.    42F s/p hysterectomy 2wks ago for fibroids, presents with several days of fever.  +Body aches, +pelvic pain.  Denies cp, sob, n/v/d, urinary symptoms.    Exam:  - nad  - tachy regular  - ctab  - abd soft, incision appears c/d/i  - +purulent discharge on pelvic exam    A/P  - likely post-op infection  - cbc, cmp, vbg, blood cultures x 2, ua, urine culture  - IVF, abx, tylenol  - GYN consult Dr. Tsai:  I have personally performed a face to face bedside history and physical examination of this patient. I have discussed the history, examination, review of systems, assessment and plan of management with the resident. I have reviewed the electronic medical record and amended it to reflect my history, review of systems, physical exam, assessment and plan.    42F h/o lupus nephritis on chronic steroids s/p hysterectomy 2wks ago for fibroids, presents with several days of fever.  +Body aches, +pelvic pain.  Denies cp, sob, n/v/d, urinary symptoms.    Exam:  - nad  - tachy regular  - ctab  - abd soft, incision appears c/d/i  - +purulent discharge on pelvic exam    A/P  - likely post-op infection  - cbc, cmp, vbg, blood cultures x 2, ua, urine culture  - IVF, abx, tylenol  - GYN consult Dr. Tsai:  I have personally performed a face to face bedside history and physical examination of this patient. I have discussed the history, examination, review of systems, assessment and plan of management with the resident. I have reviewed the electronic medical record and amended it to reflect my history, review of systems, physical exam, assessment and plan.    42F h/o lupus nephritis on chronic steroids s/p hysterectomy 2wks ago for fibroids, presents with several days of fever.  +Body aches, +pelvic pain.  Denies cp, sob, n/v/d, urinary symptoms.    Exam:  - nad  - tachy regular  - ctab  - abd soft, incision appears c/d/i  - +purulent discharge on pelvic exam    A/P  - likely post-op infection  - cbc, cmp, vbg, blood cultures x 2, ua, urine culture  - CT abd/pelvis with IV contrast  - IVF, abx, tylenol  - GYN consult

## 2017-09-15 NOTE — ED PROVIDER NOTE - MEDICAL DECISION MAKING DETAILS
42F with sepsis after hysterectomy with immuno and adreanal suppression on chronic steroids. r/o pelvic abscess collection. check cbc, cmp, vbg, bl cx, ucg. give ivf, iv zosyn and iv vanco. ct ap, gyn cx, iv tylenol and stress dose steroids.

## 2017-09-15 NOTE — ED PROVIDER NOTE - OBJECTIVE STATEMENT
42F with hx of Lupus Nephritis on chronic steroids presents with fever. 42F with hx of Lupus Nephritis on chronic steroids presents with fever. pt is s/p hysterectomy 13 days ago, now with fever and pelvic pain x3 days. pt saw her ob-gyn yesterday and abx sent to her pharmacy, pt sisnt have the chance to pick them up because was feeling to unwell. (+) fever and chills, denies dysuria or urinary frequency, just pressure. (+) myalgias. no abd pain nausea or vomiting. 42F with hx of Lupus Nephritis on chronic steroids presents with fever. pt is s/p hysterectomy 13 days ago, now with fever and pelvic pain x3 days. pt saw her ob-gyn yesterday and abx sent to her pharmacy, pt isnt have the chance to pick them up because was feeling to unwell. (+) fever and chills, denies dysuria or urinary frequency, just pressure. (+) myalgias. no abd pain nausea or vomiting.

## 2017-09-15 NOTE — H&P ADULT - HISTORY OF PRESENT ILLNESS
43 yo F presents with fever, diffuse abdominal pain x 3 days. Pt s/p ELE, RSO, left salpingectomy on 9/1/17. Pt had post-operative check yesterday with Dr. Agee where labs were drawn and pt was prescribed abx (did not take). Pt presented to ED today with worsening symptoms. Pt presented to ED tachycardia to 120s and febrile to 39.1C. Pt found to have purulent vaginal discharge. She denied any SOB, CP, n/v, fever/chills. She denied any urinary symptoms. No other complaints at this time. Of note, pt has lupus nephritis for which she takes plaquenil, prednisone, cellcept, and valsartan. Pt's last flare several months ago. Pt denies any prior hospitalizations for lupus-related complications. Pt also notes that she had an eye infection for which she has been taking abx eye drops.

## 2017-09-15 NOTE — H&P ADULT - NSHPLABSRESULTS_GEN_ALL_CORE
10.5   21.99 )-----------( 350      ( 15 Sep 2017 16:05 )             32.6     09-15    137  |  97<L>  |  7   ----------------------------<  105<H>  4.0   |  22  |  0.66    Ca    9.7      15 Sep 2017 16:05    TPro  7.4  /  Alb  3.0<L>  /  TBili  0.5  /  DBili  x   /  AST  15  /  ALT  12  /  AlkPhos  118  09-15    Lactate 2.7

## 2017-09-15 NOTE — H&P ADULT - ASSESSMENT
43 y/o s/p ELE, RSO, left salpingectomy for fibroids, POD#14, presents febrile, tachycardic, with purulent vaginal discharge - sepsis most likely 2/2 surgical site infection.

## 2017-09-15 NOTE — H&P ADULT - NSHPPHYSICALEXAM_GEN_ALL_CORE
Gen: NAD, sitting up in bed  CV: tachycardic, regular rhythm  Pulm: CTAB/L  Abd: soft, ND, mildly tender,   Incision: clean, dry vertical abdominal incision, steri-strips in place  Gyn: purulent vaginal discharge noted on sterile spec exam, cuff palpated - no defect noted at this time  Ext: NTBL

## 2017-09-16 DIAGNOSIS — N73.9 FEMALE PELVIC INFLAMMATORY DISEASE, UNSPECIFIED: ICD-10-CM

## 2017-09-16 LAB
APTT BLD: 29.9 SEC — SIGNIFICANT CHANGE UP (ref 27.5–37.4)
BASOPHILS # BLD AUTO: 0.03 K/UL — SIGNIFICANT CHANGE UP (ref 0–0.2)
BASOPHILS NFR BLD AUTO: 0.2 % — SIGNIFICANT CHANGE UP (ref 0–2)
BLD GP AB SCN SERPL QL: NEGATIVE — SIGNIFICANT CHANGE UP
BUN SERPL-MCNC: 7 MG/DL — SIGNIFICANT CHANGE UP (ref 7–23)
CALCIUM SERPL-MCNC: 8.5 MG/DL — SIGNIFICANT CHANGE UP (ref 8.4–10.5)
CHLORIDE SERPL-SCNC: 106 MMOL/L — SIGNIFICANT CHANGE UP (ref 98–107)
CO2 SERPL-SCNC: 23 MMOL/L — SIGNIFICANT CHANGE UP (ref 22–31)
CREAT SERPL-MCNC: 0.53 MG/DL — SIGNIFICANT CHANGE UP (ref 0.5–1.3)
EOSINOPHIL # BLD AUTO: 0.01 K/UL — SIGNIFICANT CHANGE UP (ref 0–0.5)
EOSINOPHIL NFR BLD AUTO: 0.1 % — SIGNIFICANT CHANGE UP (ref 0–6)
GLUCOSE SERPL-MCNC: 85 MG/DL — SIGNIFICANT CHANGE UP (ref 70–99)
HCT VFR BLD CALC: 24.6 % — LOW (ref 34.5–45)
HGB BLD-MCNC: 8.1 G/DL — LOW (ref 11.5–15.5)
IMM GRANULOCYTES # BLD AUTO: 0.08 # — SIGNIFICANT CHANGE UP
IMM GRANULOCYTES NFR BLD AUTO: 0.5 % — SIGNIFICANT CHANGE UP (ref 0–1.5)
INR BLD: 1.18 — HIGH (ref 0.88–1.17)
LYMPHOCYTES # BLD AUTO: 11.9 % — LOW (ref 13–44)
LYMPHOCYTES # BLD AUTO: 2.06 K/UL — SIGNIFICANT CHANGE UP (ref 1–3.3)
MCHC RBC-ENTMCNC: 27.5 PG — SIGNIFICANT CHANGE UP (ref 27–34)
MCHC RBC-ENTMCNC: 32.9 % — SIGNIFICANT CHANGE UP (ref 32–36)
MCV RBC AUTO: 83.4 FL — SIGNIFICANT CHANGE UP (ref 80–100)
MONOCYTES # BLD AUTO: 1.82 K/UL — HIGH (ref 0–0.9)
MONOCYTES NFR BLD AUTO: 10.5 % — SIGNIFICANT CHANGE UP (ref 2–14)
NEUTROPHILS # BLD AUTO: 13.28 K/UL — HIGH (ref 1.8–7.4)
NEUTROPHILS NFR BLD AUTO: 76.8 % — SIGNIFICANT CHANGE UP (ref 43–77)
NRBC # FLD: 0 — SIGNIFICANT CHANGE UP
PLATELET # BLD AUTO: 287 K/UL — SIGNIFICANT CHANGE UP (ref 150–400)
PMV BLD: 11 FL — SIGNIFICANT CHANGE UP (ref 7–13)
POTASSIUM SERPL-MCNC: 3.5 MMOL/L — SIGNIFICANT CHANGE UP (ref 3.5–5.3)
POTASSIUM SERPL-SCNC: 3.5 MMOL/L — SIGNIFICANT CHANGE UP (ref 3.5–5.3)
PROTHROM AB SERPL-ACNC: 13.3 SEC — HIGH (ref 9.8–13.1)
RBC # BLD: 2.95 M/UL — LOW (ref 3.8–5.2)
RBC # FLD: 13.5 % — SIGNIFICANT CHANGE UP (ref 10.3–14.5)
RH IG SCN BLD-IMP: NEGATIVE — SIGNIFICANT CHANGE UP
SODIUM SERPL-SCNC: 143 MMOL/L — SIGNIFICANT CHANGE UP (ref 135–145)
SPECIMEN SOURCE: SIGNIFICANT CHANGE UP
SPECIMEN SOURCE: SIGNIFICANT CHANGE UP
WBC # BLD: 17.28 K/UL — HIGH (ref 3.8–10.5)
WBC # FLD AUTO: 17.28 K/UL — HIGH (ref 3.8–10.5)

## 2017-09-16 PROCEDURE — 49407 IMAGE CATH FLUID TRNS/VGNL: CPT

## 2017-09-16 RX ORDER — VANCOMYCIN HCL 1 G
1250 VIAL (EA) INTRAVENOUS EVERY 12 HOURS
Refills: 0 | Status: DISCONTINUED | OUTPATIENT
Start: 2017-09-16 | End: 2017-09-17

## 2017-09-16 RX ORDER — MORPHINE SULFATE 50 MG/1
2 CAPSULE, EXTENDED RELEASE ORAL EVERY 6 HOURS
Refills: 0 | Status: DISCONTINUED | OUTPATIENT
Start: 2017-09-16 | End: 2017-09-20

## 2017-09-16 RX ORDER — ACETAMINOPHEN 500 MG
975 TABLET ORAL EVERY 6 HOURS
Refills: 0 | Status: DISCONTINUED | OUTPATIENT
Start: 2017-09-16 | End: 2017-09-20

## 2017-09-16 RX ORDER — HEPARIN SODIUM 5000 [USP'U]/ML
5000 INJECTION INTRAVENOUS; SUBCUTANEOUS EVERY 12 HOURS
Refills: 0 | Status: DISCONTINUED | OUTPATIENT
Start: 2017-09-16 | End: 2017-09-16

## 2017-09-16 RX ORDER — ACETAMINOPHEN 500 MG
1000 TABLET ORAL ONCE
Refills: 0 | Status: COMPLETED | OUTPATIENT
Start: 2017-09-16 | End: 2017-09-16

## 2017-09-16 RX ORDER — INFLUENZA VIRUS VACCINE 15; 15; 15; 15 UG/.5ML; UG/.5ML; UG/.5ML; UG/.5ML
0.5 SUSPENSION INTRAMUSCULAR ONCE
Refills: 0 | Status: DISCONTINUED | OUTPATIENT
Start: 2017-09-16 | End: 2017-09-20

## 2017-09-16 RX ADMIN — PIPERACILLIN AND TAZOBACTAM 25 GRAM(S): 4; .5 INJECTION, POWDER, LYOPHILIZED, FOR SOLUTION INTRAVENOUS at 00:18

## 2017-09-16 RX ADMIN — Medication 1000 MILLIGRAM(S): at 07:15

## 2017-09-16 RX ADMIN — PIPERACILLIN AND TAZOBACTAM 25 GRAM(S): 4; .5 INJECTION, POWDER, LYOPHILIZED, FOR SOLUTION INTRAVENOUS at 09:14

## 2017-09-16 RX ADMIN — HEPARIN SODIUM 5000 UNIT(S): 5000 INJECTION INTRAVENOUS; SUBCUTANEOUS at 06:01

## 2017-09-16 RX ADMIN — MYCOPHENOLATE MOFETIL 1500 MILLIGRAM(S): 250 CAPSULE ORAL at 18:41

## 2017-09-16 RX ADMIN — Medication 166.67 MILLIGRAM(S): at 18:39

## 2017-09-16 RX ADMIN — VALSARTAN 80 MILLIGRAM(S): 80 TABLET ORAL at 06:02

## 2017-09-16 RX ADMIN — Medication 975 MILLIGRAM(S): at 23:04

## 2017-09-16 RX ADMIN — SODIUM CHLORIDE 125 MILLILITER(S): 9 INJECTION, SOLUTION INTRAVENOUS at 00:18

## 2017-09-16 RX ADMIN — Medication 200 MILLIGRAM(S): at 13:37

## 2017-09-16 RX ADMIN — MYCOPHENOLATE MOFETIL 1500 MILLIGRAM(S): 250 CAPSULE ORAL at 06:01

## 2017-09-16 RX ADMIN — HEPARIN SODIUM 5000 UNIT(S): 5000 INJECTION INTRAVENOUS; SUBCUTANEOUS at 18:39

## 2017-09-16 RX ADMIN — Medication 975 MILLIGRAM(S): at 23:52

## 2017-09-16 RX ADMIN — SODIUM CHLORIDE 125 MILLILITER(S): 9 INJECTION, SOLUTION INTRAVENOUS at 09:18

## 2017-09-16 RX ADMIN — Medication 10 MILLIGRAM(S): at 06:02

## 2017-09-16 RX ADMIN — Medication 400 MILLIGRAM(S): at 06:46

## 2017-09-16 RX ADMIN — PIPERACILLIN AND TAZOBACTAM 25 GRAM(S): 4; .5 INJECTION, POWDER, LYOPHILIZED, FOR SOLUTION INTRAVENOUS at 15:30

## 2017-09-16 RX ADMIN — PIPERACILLIN AND TAZOBACTAM 25 GRAM(S): 4; .5 INJECTION, POWDER, LYOPHILIZED, FOR SOLUTION INTRAVENOUS at 21:54

## 2017-09-16 NOTE — ED ADULT NURSE REASSESSMENT NOTE - NS ED NURSE REASSESS COMMENT FT1
Pt resting comfortably in stretcher, HR Stachy 97 on CM, resps even/unlabored on RA. States Pain rx IV Tylenol helped w/ pelvic pain and denies need for addl pain rx at this time. Stable and comfortable appearing. Will CTM closely.
REPORT given to Ilana BACON RN

## 2017-09-16 NOTE — PROGRESS NOTE ADULT - PROBLEM SELECTOR PLAN 1
Neuro: c/w IV pain meds  CV: Tachycardic  Pulm: Saturating well on room air  GI: NPO  : voiding spontaneously  Heme: SCD for DVT ppx  ID: Continue Zosyn. s/p Vanc. Will discuss w/IR this morning about possible drainage. Continue to monitor CBC and vitals.  Dispo: For discussion with IR this AM    SELMA Miller, PGY2 Neuro: c/w IV pain meds  CV: Tachycardic, continue to monitor.   Pulm: Saturating well on room air  GI: NPO  : voiding spontaneously  Heme: SCD for DVT ppx  ID: Continue Zosyn and Vanc. Will discuss w/IR this morning about possible drainage. Continue to monitor CBC and vitals.  Dispo: For discussion with IR this AM for possible drainage    SELMA Miller, PGY2

## 2017-09-17 LAB
BACTERIA UR CULT: SIGNIFICANT CHANGE UP
BASOPHILS # BLD AUTO: 0.02 K/UL — SIGNIFICANT CHANGE UP (ref 0–0.2)
BASOPHILS NFR BLD AUTO: 0.2 % — SIGNIFICANT CHANGE UP (ref 0–2)
BUN SERPL-MCNC: 6 MG/DL — LOW (ref 7–23)
CALCIUM SERPL-MCNC: 8.8 MG/DL — SIGNIFICANT CHANGE UP (ref 8.4–10.5)
CHLORIDE SERPL-SCNC: 104 MMOL/L — SIGNIFICANT CHANGE UP (ref 98–107)
CO2 SERPL-SCNC: 25 MMOL/L — SIGNIFICANT CHANGE UP (ref 22–31)
CREAT SERPL-MCNC: 0.59 MG/DL — SIGNIFICANT CHANGE UP (ref 0.5–1.3)
EOSINOPHIL # BLD AUTO: 0.05 K/UL — SIGNIFICANT CHANGE UP (ref 0–0.5)
EOSINOPHIL NFR BLD AUTO: 0.6 % — SIGNIFICANT CHANGE UP (ref 0–6)
GLUCOSE SERPL-MCNC: 120 MG/DL — HIGH (ref 70–99)
GRAM STN WND: SIGNIFICANT CHANGE UP
HCT VFR BLD CALC: 24.9 % — LOW (ref 34.5–45)
HGB BLD-MCNC: 8 G/DL — LOW (ref 11.5–15.5)
IMM GRANULOCYTES # BLD AUTO: 0.02 # — SIGNIFICANT CHANGE UP
IMM GRANULOCYTES NFR BLD AUTO: 0.2 % — SIGNIFICANT CHANGE UP (ref 0–1.5)
LYMPHOCYTES # BLD AUTO: 1.71 K/UL — SIGNIFICANT CHANGE UP (ref 1–3.3)
LYMPHOCYTES # BLD AUTO: 20.2 % — SIGNIFICANT CHANGE UP (ref 13–44)
MCHC RBC-ENTMCNC: 27.2 PG — SIGNIFICANT CHANGE UP (ref 27–34)
MCHC RBC-ENTMCNC: 32.1 % — SIGNIFICANT CHANGE UP (ref 32–36)
MCV RBC AUTO: 84.7 FL — SIGNIFICANT CHANGE UP (ref 80–100)
MONOCYTES # BLD AUTO: 0.83 K/UL — SIGNIFICANT CHANGE UP (ref 0–0.9)
MONOCYTES NFR BLD AUTO: 9.8 % — SIGNIFICANT CHANGE UP (ref 2–14)
NEUTROPHILS # BLD AUTO: 5.84 K/UL — SIGNIFICANT CHANGE UP (ref 1.8–7.4)
NEUTROPHILS NFR BLD AUTO: 69 % — SIGNIFICANT CHANGE UP (ref 43–77)
NRBC # FLD: 0 — SIGNIFICANT CHANGE UP
PLATELET # BLD AUTO: 282 K/UL — SIGNIFICANT CHANGE UP (ref 150–400)
PMV BLD: 10.4 FL — SIGNIFICANT CHANGE UP (ref 7–13)
POTASSIUM SERPL-MCNC: 3.3 MMOL/L — LOW (ref 3.5–5.3)
POTASSIUM SERPL-SCNC: 3.3 MMOL/L — LOW (ref 3.5–5.3)
RBC # BLD: 2.94 M/UL — LOW (ref 3.8–5.2)
RBC # FLD: 13.6 % — SIGNIFICANT CHANGE UP (ref 10.3–14.5)
SODIUM SERPL-SCNC: 141 MMOL/L — SIGNIFICANT CHANGE UP (ref 135–145)
SPECIMEN SOURCE: SIGNIFICANT CHANGE UP
SPECIMEN SOURCE: SIGNIFICANT CHANGE UP
VANCOMYCIN TROUGH SERPL-MCNC: 7.4 UG/ML — LOW (ref 10–20)
WBC # BLD: 8.47 K/UL — SIGNIFICANT CHANGE UP (ref 3.8–10.5)
WBC # FLD AUTO: 8.47 K/UL — SIGNIFICANT CHANGE UP (ref 3.8–10.5)

## 2017-09-17 RX ORDER — ZOLPIDEM TARTRATE 10 MG/1
5 TABLET ORAL AT BEDTIME
Refills: 0 | Status: DISCONTINUED | OUTPATIENT
Start: 2017-09-17 | End: 2017-09-20

## 2017-09-17 RX ORDER — VANCOMYCIN HCL 1 G
1500 VIAL (EA) INTRAVENOUS EVERY 12 HOURS
Refills: 0 | Status: DISCONTINUED | OUTPATIENT
Start: 2017-09-17 | End: 2017-09-18

## 2017-09-17 RX ADMIN — PIPERACILLIN AND TAZOBACTAM 25 GRAM(S): 4; .5 INJECTION, POWDER, LYOPHILIZED, FOR SOLUTION INTRAVENOUS at 13:48

## 2017-09-17 RX ADMIN — VALSARTAN 80 MILLIGRAM(S): 80 TABLET ORAL at 09:19

## 2017-09-17 RX ADMIN — Medication 166.67 MILLIGRAM(S): at 05:07

## 2017-09-17 RX ADMIN — MYCOPHENOLATE MOFETIL 1500 MILLIGRAM(S): 250 CAPSULE ORAL at 20:51

## 2017-09-17 RX ADMIN — Medication 10 MILLIGRAM(S): at 09:49

## 2017-09-17 RX ADMIN — SODIUM CHLORIDE 125 MILLILITER(S): 9 INJECTION, SOLUTION INTRAVENOUS at 09:18

## 2017-09-17 RX ADMIN — Medication 300 MILLIGRAM(S): at 19:19

## 2017-09-17 RX ADMIN — MYCOPHENOLATE MOFETIL 1500 MILLIGRAM(S): 250 CAPSULE ORAL at 05:07

## 2017-09-17 RX ADMIN — PIPERACILLIN AND TAZOBACTAM 25 GRAM(S): 4; .5 INJECTION, POWDER, LYOPHILIZED, FOR SOLUTION INTRAVENOUS at 06:45

## 2017-09-17 RX ADMIN — HEPARIN SODIUM 5000 UNIT(S): 5000 INJECTION INTRAVENOUS; SUBCUTANEOUS at 17:09

## 2017-09-17 RX ADMIN — HEPARIN SODIUM 5000 UNIT(S): 5000 INJECTION INTRAVENOUS; SUBCUTANEOUS at 05:06

## 2017-09-17 RX ADMIN — Medication 200 MILLIGRAM(S): at 12:36

## 2017-09-17 RX ADMIN — PIPERACILLIN AND TAZOBACTAM 25 GRAM(S): 4; .5 INJECTION, POWDER, LYOPHILIZED, FOR SOLUTION INTRAVENOUS at 21:06

## 2017-09-17 NOTE — PROGRESS NOTE ADULT - PROBLEM SELECTOR PLAN 1
Neuro: c/w IV pain meds  CV: Hemodynamically stable.  Pulm: Saturating well on room air  GI: Tolerating Regular Diet  : voiding spontaneously  Heme: SCD for DVT ppx  ID: Continue Zosyn and Vanc, s/p IR drainage on 9/16. Will follow AM CBC. Tra  Dispo: Continue routine floor care    SELMA Miller, PGY2

## 2017-09-18 ENCOUNTER — TRANSCRIPTION ENCOUNTER (OUTPATIENT)
Age: 42
End: 2017-09-18

## 2017-09-18 LAB
BASOPHILS # BLD AUTO: 0.01 K/UL — SIGNIFICANT CHANGE UP (ref 0–0.2)
BASOPHILS NFR BLD AUTO: 0.2 % — SIGNIFICANT CHANGE UP (ref 0–2)
EOSINOPHIL # BLD AUTO: 0.08 K/UL — SIGNIFICANT CHANGE UP (ref 0–0.5)
EOSINOPHIL NFR BLD AUTO: 1.7 % — SIGNIFICANT CHANGE UP (ref 0–6)
HCT VFR BLD CALC: 24.1 % — LOW (ref 34.5–45)
HGB BLD-MCNC: 8 G/DL — LOW (ref 11.5–15.5)
IMM GRANULOCYTES # BLD AUTO: 0.02 # — SIGNIFICANT CHANGE UP
IMM GRANULOCYTES NFR BLD AUTO: 0.4 % — SIGNIFICANT CHANGE UP (ref 0–1.5)
LYMPHOCYTES # BLD AUTO: 2.33 K/UL — SIGNIFICANT CHANGE UP (ref 1–3.3)
LYMPHOCYTES # BLD AUTO: 48.2 % — HIGH (ref 13–44)
MCHC RBC-ENTMCNC: 28.2 PG — SIGNIFICANT CHANGE UP (ref 27–34)
MCHC RBC-ENTMCNC: 33.2 % — SIGNIFICANT CHANGE UP (ref 32–36)
MCV RBC AUTO: 84.9 FL — SIGNIFICANT CHANGE UP (ref 80–100)
MONOCYTES # BLD AUTO: 0.58 K/UL — SIGNIFICANT CHANGE UP (ref 0–0.9)
MONOCYTES NFR BLD AUTO: 12 % — SIGNIFICANT CHANGE UP (ref 2–14)
NEUTROPHILS # BLD AUTO: 1.81 K/UL — SIGNIFICANT CHANGE UP (ref 1.8–7.4)
NEUTROPHILS NFR BLD AUTO: 37.5 % — LOW (ref 43–77)
NRBC # FLD: 0 — SIGNIFICANT CHANGE UP
PLATELET # BLD AUTO: 292 K/UL — SIGNIFICANT CHANGE UP (ref 150–400)
PMV BLD: 10.3 FL — SIGNIFICANT CHANGE UP (ref 7–13)
RBC # BLD: 2.84 M/UL — LOW (ref 3.8–5.2)
RBC # FLD: 13.4 % — SIGNIFICANT CHANGE UP (ref 10.3–14.5)
SPECIMEN SOURCE: SIGNIFICANT CHANGE UP
WBC # BLD: 4.83 K/UL — SIGNIFICANT CHANGE UP (ref 3.8–10.5)
WBC # FLD AUTO: 4.83 K/UL — SIGNIFICANT CHANGE UP (ref 3.8–10.5)

## 2017-09-18 RX ORDER — VANCOMYCIN HCL 1 G
1500 VIAL (EA) INTRAVENOUS EVERY 12 HOURS
Refills: 0 | Status: DISCONTINUED | OUTPATIENT
Start: 2017-09-18 | End: 2017-09-20

## 2017-09-18 RX ADMIN — VALSARTAN 80 MILLIGRAM(S): 80 TABLET ORAL at 08:32

## 2017-09-18 RX ADMIN — MYCOPHENOLATE MOFETIL 1500 MILLIGRAM(S): 250 CAPSULE ORAL at 05:43

## 2017-09-18 RX ADMIN — Medication 300 MILLIGRAM(S): at 05:43

## 2017-09-18 RX ADMIN — PIPERACILLIN AND TAZOBACTAM 25 GRAM(S): 4; .5 INJECTION, POWDER, LYOPHILIZED, FOR SOLUTION INTRAVENOUS at 21:24

## 2017-09-18 RX ADMIN — Medication 200 MILLIGRAM(S): at 11:43

## 2017-09-18 RX ADMIN — PIPERACILLIN AND TAZOBACTAM 25 GRAM(S): 4; .5 INJECTION, POWDER, LYOPHILIZED, FOR SOLUTION INTRAVENOUS at 08:32

## 2017-09-18 RX ADMIN — Medication 300 MILLIGRAM(S): at 17:40

## 2017-09-18 RX ADMIN — HEPARIN SODIUM 5000 UNIT(S): 5000 INJECTION INTRAVENOUS; SUBCUTANEOUS at 17:40

## 2017-09-18 RX ADMIN — MYCOPHENOLATE MOFETIL 1500 MILLIGRAM(S): 250 CAPSULE ORAL at 17:40

## 2017-09-18 RX ADMIN — Medication 10 MILLIGRAM(S): at 08:32

## 2017-09-18 RX ADMIN — HEPARIN SODIUM 5000 UNIT(S): 5000 INJECTION INTRAVENOUS; SUBCUTANEOUS at 05:43

## 2017-09-18 NOTE — DISCHARGE NOTE ADULT - HOSPITAL COURSE
43yo female admitted on 9/15/17 with sepsis in setting of recent ELE, RSO, L Salpingectomy.  On HD#1, patient was started on Vancomycin and Zosyn for sepsis and CTA/P revealed 8.8cm multiloculated pelvic absess.  IR drainage of the abscess was carried out on HD#2 and transvaginal Ra drain was left in place until HD#___.  WBC trend: >24->21.99->17.2->8.4->4.83.  Hct trend: 32.6->24.6-> 24.9->24.1.  Urine and blood cultures were negative.  Fluid from IR drainage had gram+ cocci on gram stain on 9/15 with no aerobic grown on 9/18 and ___. 41yo female admitted on 9/15/17 with sepsis in setting of recent ELE, RSO, L Salpingectomy.  On HD#1, patient was started on Vancomycin and Zosyn for sepsis and CTA/P revealed 8.8cm multiloculated pelvic absess.  IR drainage of the abscess was carried out on HD#2 and transvaginal Ra drain was left in place until it was removed by IR on HD#5.  WBC trend: >24->21.99->17.2->8.4->4.83->5.26.  Hct trend: 32.6->24.6-> 24.9->24.1->26.2.  Urine and blood cultures were negative.  Fluid from IR drainage had gram+ cocci on gram stain on 9/15 with no growth on 9/20.  Patient was discharged to home on HD#6.   On the day of discharge the patient was ambulating, voiding, tolerating oral intake, had no pain, and had been afebrile and stable for over 72h.  Patient to follow up in two weeks with Dr. Agee.

## 2017-09-18 NOTE — PROGRESS NOTE ADULT - PROBLEM SELECTOR PLAN 1
Neuro: c/w IV pain meds  CV: Hemodynamically stable.  Pulm: Saturating well on room air  GI: Tolerating Regular Diet  : voiding spontaneously  Heme: SCD for DVT ppx  ID: Continue Zosyn and Vanc, s/p IR drainage on 9/16. Will follow AM CBC.   Dispo: Continue routine floor care.  Will contact IR for plan for pulling drain now vs outpt tube study.    d/w Dr. Kiran Chaney MD PGY1

## 2017-09-18 NOTE — DISCHARGE NOTE ADULT - PLAN OF CARE
Please follow up with Dr. Agee in two weeks.  Until cleared by Dr. Agee, please do not take tub baths, do not put anything in the vagina including no douching, tampons, or intercourse.  Please call the office or go to the Emergency Room if you have fever >100.4F, new or worsening pain, vaginal bleeding, dizzyness, palpitations, shortness of breath, visual or mental changes or for any other concerns. Wellness

## 2017-09-18 NOTE — DISCHARGE NOTE ADULT - CARE PLAN
Principal Discharge DX:	Abscess of female pelvis  Goal:	Wellness  Instructions for follow-up, activity and diet:	Please follow up with Dr. Agee in two weeks.  Until cleared by Dr. Agee, please do not take tub baths, do not put anything in the vagina including no douching, tampons, or intercourse.  Please call the office or go to the Emergency Room if you have fever >100.4F, new or worsening pain, vaginal bleeding, dizzyness, palpitations, shortness of breath, visual or mental changes or for any other concerns.

## 2017-09-18 NOTE — DISCHARGE NOTE ADULT - PATIENT PORTAL LINK FT
“You can access the [facility] Patient Portal, offered by [facility], by registering with the following website: [email or web address]”

## 2017-09-18 NOTE — DISCHARGE NOTE ADULT - MEDICATION SUMMARY - MEDICATIONS TO TAKE
I will START or STAY ON the medications listed below when I get home from the hospital:  None I will START or STAY ON the medications listed below when I get home from the hospital:    Augmentin 875 mg-125 mg oral tablet  -- 1 cap(s) by mouth 2 times a day MDD:2  -- Finish all this medication unless otherwise directed by prescriber.  Take with food or milk.    -- Indication: For Infection

## 2017-09-18 NOTE — DISCHARGE NOTE ADULT - CARE PROVIDER_API CALL
Matt Agee (MD), Obstetrics and Gynecology  [address] [place] [address]  Phone: [phone]  Fax: [phone]

## 2017-09-19 LAB
BUN SERPL-MCNC: 5 MG/DL — LOW (ref 7–23)
CALCIUM SERPL-MCNC: 9.3 MG/DL — SIGNIFICANT CHANGE UP (ref 8.4–10.5)
CHLORIDE SERPL-SCNC: 105 MMOL/L — SIGNIFICANT CHANGE UP (ref 98–107)
CO2 SERPL-SCNC: 24 MMOL/L — SIGNIFICANT CHANGE UP (ref 22–31)
CREAT SERPL-MCNC: 0.57 MG/DL — SIGNIFICANT CHANGE UP (ref 0.5–1.3)
GLUCOSE SERPL-MCNC: 102 MG/DL — HIGH (ref 70–99)
HCT VFR BLD CALC: 26.2 % — LOW (ref 34.5–45)
HGB BLD-MCNC: 8.5 G/DL — LOW (ref 11.5–15.5)
MCHC RBC-ENTMCNC: 27.2 PG — SIGNIFICANT CHANGE UP (ref 27–34)
MCHC RBC-ENTMCNC: 32.4 % — SIGNIFICANT CHANGE UP (ref 32–36)
MCV RBC AUTO: 83.7 FL — SIGNIFICANT CHANGE UP (ref 80–100)
NRBC # FLD: 0 — SIGNIFICANT CHANGE UP
PLATELET # BLD AUTO: 356 K/UL — SIGNIFICANT CHANGE UP (ref 150–400)
PMV BLD: 11 FL — SIGNIFICANT CHANGE UP (ref 7–13)
POTASSIUM SERPL-MCNC: 3.6 MMOL/L — SIGNIFICANT CHANGE UP (ref 3.5–5.3)
POTASSIUM SERPL-SCNC: 3.6 MMOL/L — SIGNIFICANT CHANGE UP (ref 3.5–5.3)
RBC # BLD: 3.13 M/UL — LOW (ref 3.8–5.2)
RBC # FLD: 13.7 % — SIGNIFICANT CHANGE UP (ref 10.3–14.5)
SODIUM SERPL-SCNC: 145 MMOL/L — SIGNIFICANT CHANGE UP (ref 135–145)
VANCOMYCIN TROUGH SERPL-MCNC: 27.5 UG/ML — CRITICAL HIGH (ref 10–20)
VANCOMYCIN TROUGH SERPL-MCNC: 5.3 UG/ML — LOW (ref 10–20)
WBC # BLD: 5.26 K/UL — SIGNIFICANT CHANGE UP (ref 3.8–10.5)
WBC # FLD AUTO: 5.26 K/UL — SIGNIFICANT CHANGE UP (ref 3.8–10.5)

## 2017-09-19 PROCEDURE — 49424 ASSESS CYST CONTRAST INJECT: CPT

## 2017-09-19 PROCEDURE — 72192 CT PELVIS W/O DYE: CPT | Mod: 26

## 2017-09-19 PROCEDURE — 76080 X-RAY EXAM OF FISTULA: CPT | Mod: 26

## 2017-09-19 RX ORDER — HYDROXYCHLOROQUINE SULFATE 200 MG
400 TABLET ORAL DAILY
Refills: 0 | Status: DISCONTINUED | OUTPATIENT
Start: 2017-09-19 | End: 2017-09-20

## 2017-09-19 RX ADMIN — HEPARIN SODIUM 5000 UNIT(S): 5000 INJECTION INTRAVENOUS; SUBCUTANEOUS at 17:38

## 2017-09-19 RX ADMIN — Medication 300 MILLIGRAM(S): at 21:44

## 2017-09-19 RX ADMIN — PIPERACILLIN AND TAZOBACTAM 25 GRAM(S): 4; .5 INJECTION, POWDER, LYOPHILIZED, FOR SOLUTION INTRAVENOUS at 09:13

## 2017-09-19 RX ADMIN — MYCOPHENOLATE MOFETIL 1500 MILLIGRAM(S): 250 CAPSULE ORAL at 05:38

## 2017-09-19 RX ADMIN — HEPARIN SODIUM 5000 UNIT(S): 5000 INJECTION INTRAVENOUS; SUBCUTANEOUS at 05:38

## 2017-09-19 RX ADMIN — Medication 200 MILLIGRAM(S): at 12:56

## 2017-09-19 RX ADMIN — Medication 10 MILLIGRAM(S): at 05:38

## 2017-09-19 RX ADMIN — VALSARTAN 80 MILLIGRAM(S): 80 TABLET ORAL at 06:10

## 2017-09-19 RX ADMIN — PIPERACILLIN AND TAZOBACTAM 25 GRAM(S): 4; .5 INJECTION, POWDER, LYOPHILIZED, FOR SOLUTION INTRAVENOUS at 17:37

## 2017-09-19 RX ADMIN — MYCOPHENOLATE MOFETIL 1500 MILLIGRAM(S): 250 CAPSULE ORAL at 17:38

## 2017-09-19 NOTE — CHART NOTE - NSCHARTNOTEFT_GEN_A_CORE
R1 GYN Status Update Note    S - Patient seen and examined at bedside.  Pt just returned from IR tube study.  Transvag jarred drain was removed.  Patient fees well and denies any complaints.  She is currently ambulating, tolerating po, and voiding without difficulty.      O -   VSS  Gen: NAD, A&Ox3  Abd: soft, NT, +BS  Ext: wwp, no edema    A/P: 41yo a/w sepsis/multiloculated pelvic absess s/p IR drainage.  Patient continues to improve.  She has been afebrile and on vanc/zosyn since 9/15 (4p).  Vanc trough was elevated this morning to 27.5 and am dose of Vanc was held.     - continue to monitor VS overnight q4h   - hold vanc and zosyn   - am CBC/BMP   - if patient continues to be afebrile and stable she can be discharged in the morning    d/w Dr. Agee.  Sierra Chaney MD PGY1

## 2017-09-19 NOTE — PROGRESS NOTE ADULT - PROBLEM SELECTOR PLAN 1
Neuro: c/w tylenol po for pain  CV: Hemodynamically stable.  Pulm: Saturating well on room air  GI: Tolerating Regular Diet  : voiding spontaneously  Heme: SCD for DVT ppx; continue home Lupus meds  ID: Continue Zosyn and Vanc, s/p IR drainage on 9/16. Will follow AM CBC.   Dispo: Continue routine floor care.  Will contact IR for plan for pulling drain now vs outpt tube study.    discussed with Dr. Beck.  Sierra Chaney MD PGY1 Neuro: c/w tylenol po for pain  CV: Hemodynamically stable.  Pulm: Saturating well on room air  GI: Tolerating Regular Diet  : voiding spontaneously  Heme: SCD for DVT ppx; continue home Lupus meds  ID: Continue Zosyn and Vanc, s/p IR drainage on 9/16. Will follow AM CBC. f/u Vanc trough.  Dispo: Continue routine floor care.  Will contact IR for plan for pulling drain now vs outpt tube study.    discussed with Dr. Beck.  Sierra Chaney MD PGY1

## 2017-09-20 VITALS
TEMPERATURE: 98 F | SYSTOLIC BLOOD PRESSURE: 119 MMHG | OXYGEN SATURATION: 100 % | HEART RATE: 95 BPM | DIASTOLIC BLOOD PRESSURE: 77 MMHG | RESPIRATION RATE: 19 BRPM

## 2017-09-20 LAB
BUN SERPL-MCNC: 7 MG/DL — SIGNIFICANT CHANGE UP (ref 7–23)
CALCIUM SERPL-MCNC: 9 MG/DL — SIGNIFICANT CHANGE UP (ref 8.4–10.5)
CHLORIDE SERPL-SCNC: 109 MMOL/L — HIGH (ref 98–107)
CK MB BLD-MCNC: 1 NG/ML — SIGNIFICANT CHANGE UP (ref 1–4.7)
CK MB BLD-MCNC: SIGNIFICANT CHANGE UP (ref 0–2.5)
CK SERPL-CCNC: 22 U/L — LOW (ref 25–170)
CO2 SERPL-SCNC: 23 MMOL/L — SIGNIFICANT CHANGE UP (ref 22–31)
CREAT SERPL-MCNC: 0.61 MG/DL — SIGNIFICANT CHANGE UP (ref 0.5–1.3)
CULTURE - SURGICAL SITE: SIGNIFICANT CHANGE UP
GLUCOSE SERPL-MCNC: 94 MG/DL — SIGNIFICANT CHANGE UP (ref 70–99)
HCT VFR BLD CALC: 24.6 % — LOW (ref 34.5–45)
HGB BLD-MCNC: 8 G/DL — LOW (ref 11.5–15.5)
MCHC RBC-ENTMCNC: 26.9 PG — LOW (ref 27–34)
MCHC RBC-ENTMCNC: 32.5 % — SIGNIFICANT CHANGE UP (ref 32–36)
MCV RBC AUTO: 82.8 FL — SIGNIFICANT CHANGE UP (ref 80–100)
NRBC # FLD: 0 — SIGNIFICANT CHANGE UP
PLATELET # BLD AUTO: 358 K/UL — SIGNIFICANT CHANGE UP (ref 150–400)
PMV BLD: 10.5 FL — SIGNIFICANT CHANGE UP (ref 7–13)
POTASSIUM SERPL-MCNC: 3.7 MMOL/L — SIGNIFICANT CHANGE UP (ref 3.5–5.3)
POTASSIUM SERPL-SCNC: 3.7 MMOL/L — SIGNIFICANT CHANGE UP (ref 3.5–5.3)
RBC # BLD: 2.97 M/UL — LOW (ref 3.8–5.2)
RBC # FLD: 13.7 % — SIGNIFICANT CHANGE UP (ref 10.3–14.5)
SODIUM SERPL-SCNC: 145 MMOL/L — SIGNIFICANT CHANGE UP (ref 135–145)
TROPONIN T SERPL-MCNC: < 0.06 NG/ML — SIGNIFICANT CHANGE UP (ref 0–0.06)
WBC # BLD: 6.2 K/UL — SIGNIFICANT CHANGE UP (ref 3.8–10.5)
WBC # FLD AUTO: 6.2 K/UL — SIGNIFICANT CHANGE UP (ref 3.8–10.5)

## 2017-09-20 RX ADMIN — Medication 300 MILLIGRAM(S): at 05:51

## 2017-09-20 RX ADMIN — Medication 400 MILLIGRAM(S): at 09:18

## 2017-09-20 RX ADMIN — HEPARIN SODIUM 5000 UNIT(S): 5000 INJECTION INTRAVENOUS; SUBCUTANEOUS at 05:51

## 2017-09-20 RX ADMIN — Medication 10 MILLIGRAM(S): at 09:18

## 2017-09-20 RX ADMIN — VALSARTAN 80 MILLIGRAM(S): 80 TABLET ORAL at 05:51

## 2017-09-20 RX ADMIN — MYCOPHENOLATE MOFETIL 1500 MILLIGRAM(S): 250 CAPSULE ORAL at 05:51

## 2017-09-20 NOTE — PROGRESS NOTE ADULT - PROBLEM SELECTOR PLAN 1
Neuro: c/w tylenol po prn pain  CV: Hemodynamically stable.  Pulm: Saturating well on room air  GI: Tolerating Regular Diet  : voiding spontaneously  Heme: SCD for DVT ppx; continue home Lupus meds, f/u AM CBC.  ID: s/p Zosyn and Vanc, s/p IR drainage on 9/16. Will follow AM CBC.   Dispo: Continue routine floor care.  Likely d/c home today after final result from drain fluid cultures.    d/w Dr. Beck.  Sierra Chaney MD PGY1

## 2017-09-20 NOTE — PROGRESS NOTE ADULT - PROBLEM SELECTOR PROBLEM 1
Abscess of female pelvis
Sepsis
Abscess of female pelvis
Sepsis

## 2017-09-20 NOTE — PROGRESS NOTE ADULT - ASSESSMENT
41yo HD#2 a/w sepsis POD#14 s/p Total Abdominal Hysterectomy Right Salpingo-Oophorectomy, L Salpingectomy, found to have 8.8cm multiloculated pelvic abscess.
41yo HD#6 a/w sepsis POD#19 s/p Total Abdominal Hysterectomy Right Salpingo-Oophorectomy, L Salpingectomy, found to have 8.8cm multiloculated pelvic abscess, now POD#2 s/p IR drainage of 70cc fluid.
doing well with IR drain  give pill for sleep   continue antibiotics
41yo HD#3 a/w sepsis POD#14 s/p Total Abdominal Hysterectomy Right Salpingo-Oophorectomy, L Salpingectomy, found to have 8.8cm multiloculated pelvic abscess, s/p IR drainage on 9/16. Patient is stable.
41yo HD#4 a/w sepsis POD#17 s/p Total Abdominal Hysterectomy Right Salpingo-Oophorectomy, L Salpingectomy, found to have 8.8cm multiloculated pelvic abscess, now POD#1 s/p IR drainage of 70cc fluid.
abscess post IR drain and iv antibiotic and doing well  if drain removed will watch for a spike and possibly d/c in  am
43yo HD#5 a/w sepsis POD#18 s/p Total Abdominal Hysterectomy Right Salpingo-Oophorectomy, L Salpingectomy, found to have 8.8cm multiloculated pelvic abscess, now POD#2 s/p IR drainage of 70cc fluid.
pelvic abcess will be drained and continue iv antibiotic

## 2017-09-20 NOTE — PROGRESS NOTE ADULT - SUBJECTIVE AND OBJECTIVE BOX
ANESTHESIA POSTOP CHECK    42y Female POSTOP DAY 1 S/P     Vital Signs Last 24 Hrs  T(C): 36.6 (18 Sep 2017 09:28), Max: 37.1 (17 Sep 2017 18:00)  T(F): 97.8 (18 Sep 2017 09:28), Max: 98.8 (17 Sep 2017 18:00)  HR: 100 (18 Sep 2017 09:28) (80 - 100)  BP: 116/56 (18 Sep 2017 09:28) (115/55 - 128/72)  BP(mean): --  RR: 18 (18 Sep 2017 09:28) (16 - 18)  SpO2: 97% (18 Sep 2017 09:28) (97% - 100%)  I&O's Summary    17 Sep 2017 07:01  -  18 Sep 2017 07:00  --------------------------------------------------------  IN: 1475 mL / OUT: 26 mL / NET: 1449 mL        [x ] NO APPARENT ANESTHESIA COMPLICATIONS
HD#3 POD#16    Patient seen and examined at bedside, no acute overnight events. Patient is s/p IR drainage yesterday. No acute complaints, pain well controlled.Patient is ambulating, passing flatus, voiding spontaneously, and tolerating regular diet. Denies CP, SOB, N/V, fevers, and chills.    Vital Signs Last 24 Hours  T(C): 36.7 (09-17-17 @ 05:06), Max: 37.2 (09-16-17 @ 06:02)  HR: 99 (09-17-17 @ 05:06) (95 - 110)  BP: 122/68 (09-17-17 @ 05:06) (109/61 - 131/78)  RR: 18 (09-17-17 @ 05:06) (14 - 24)  SpO2: 100% (09-17-17 @ 05:06) (97% - 100%)    I&O's Summary    16 Sep 2017 07:01  -  17 Sep 2017 05:51  --------------------------------------------------------  IN: 1125 mL / OUT: 1665 mL / NET: -540 mL        Physical Exam:  General: NAD  CV: NR, RR, S1, S2, no M/R/G  Lungs: CTA-B  Abdomen: Soft, non-tender, non-distended, +BS, Transvaginal drain serosanguinous fluid)  Ext: No pain or swelling    Labs:                        8.1    17.28 )-----------( 287      ( 16 Sep 2017 05:45 )             24.6   baso 0.2    eos 0.1    imm gran 0.5    lymph 11.9   mono 10.5   poly 76.8                         10.5   21.99 )-----------( 350      ( 15 Sep 2017 16:05 )             32.6   baso 0.1    eos 0.0    imm gran 0.8    lymph 5.0    mono 8.8    poly 85.3       MEDICATIONS  (STANDING):  piperacillin/tazobactam IVPB. 3.375 Gram(s) IV Intermittent every 8 hours  hydroxychloroquine 200 milliGRAM(s) Oral daily  predniSONE   Tablet 10 milliGRAM(s) Oral daily  valsartan 80 milliGRAM(s) Oral daily  mycophenolate mofetil 1500 milliGRAM(s) Oral two times a day  heparin  Injectable 5000 Unit(s) SubCutaneous every 12 hours  lactated ringers. 1000 milliLiter(s) (125 mL/Hr) IV Continuous <Continuous>  vancomycin  IVPB 1250 milliGRAM(s) IV Intermittent every 12 hours  influenza   Vaccine 0.5 milliLiter(s) IntraMuscular once  acetaminophen   Tablet. 975 milliGRAM(s) Oral every 6 hours    MEDICATIONS  (PRN):  morphine  - Injectable 2 milliGRAM(s) IV Push every 6 hours PRN Severe Pain (7 - 10)
Pt is s/p sung and presented with fever and ill feeling   pt was seen in my office thursday on motrin feeling better cbc drawn was 24,000 and antibiotic called in and ct ordered  pt came to er and was admitted for iv antibiotic and got ct which shows 8.8 cm abcess in pelvis  pt feels much better today and wbc's down to 17,000  IR will be contacted for drain as soon as possible
S/p IR drainage for postoperative abcess  wbc's down considerably into nl area  pt feels tired not sleeping   temp wnl
feels much better   vs stable afebrile   scant output from drain  sched to go to IR for drain check CT and possible removal
HD#2 POD#15    Patient seen and examined at bedside, no acute overnight events. No acute complaints, pain well controlled.  NPO, awaiting discussion w/IR. Denies CP, SOB, N/V, fevers, and chills.    Vital Signs Last 24 Hours  T(C): 37.2 (09-16-17 @ 06:02), Max: 39.1 (09-15-17 @ 15:00)  HR: 110 (09-16-17 @ 06:02) (109 - 124)  BP: 122/71 (09-16-17 @ 06:02) (110/64 - 124/75)  RR: 16 (09-16-17 @ 06:02) (16 - 20)  SpO2: 100% (09-16-17 @ 06:02) (99% - 100%)    I&O's Summary      Physical Exam:  General: NAD  CV: NR, RR, S1, S2, no M/R/G  Lungs: CTA-B  Abdomen: Soft, non-tender, non-distended, +BS  Ext: No pain or swelling    Labs:                        8.1    17.28 )-----------( 287      ( 16 Sep 2017 05:45 )             24.6   baso 0.2    eos 0.1    imm gran 0.5    lymph 11.9   mono 10.5   poly 76.8                         10.5   21.99 )-----------( 350      ( 15 Sep 2017 16:05 )             32.6   baso 0.1    eos 0.0    imm gran 0.8    lymph 5.0    mono 8.8    poly 85.3       MEDICATIONS  (STANDING):  piperacillin/tazobactam IVPB. 3.375 Gram(s) IV Intermittent every 8 hours  hydroxychloroquine 200 milliGRAM(s) Oral daily  predniSONE   Tablet 10 milliGRAM(s) Oral daily  valsartan 80 milliGRAM(s) Oral daily  mycophenolate mofetil 1500 milliGRAM(s) Oral two times a day  heparin  Injectable 5000 Unit(s) SubCutaneous every 12 hours  lactated ringers. 1000 milliLiter(s) (125 mL/Hr) IV Continuous <Continuous>    MEDICATIONS  (PRN):  morphine  - Injectable 2 milliGRAM(s) IV Push every 6 hours PRN Severe Pain (7 - 10)
INTERVAL HPI/OVERNIGHT EVENTS: Pt seen and examined at bedside.  Pt c/o hot flashes and night sweats overnight that have been happening since her procedure.  Otherwise she is w/o complaint this morning.  She is ambulating, passing flatus, tolerating regular diet, pain controlled with analgesia, urinating spontaneously.  She denies nausea/vomiting/fever/chills/chest pain/SOB/dizziness.    MEDICATIONS  (STANDING):  piperacillin/tazobactam IVPB. 3.375 Gram(s) IV Intermittent every 8 hours  hydroxychloroquine 200 milliGRAM(s) Oral daily  predniSONE   Tablet 10 milliGRAM(s) Oral daily  valsartan 80 milliGRAM(s) Oral daily  mycophenolate mofetil 1500 milliGRAM(s) Oral two times a day  heparin  Injectable 5000 Unit(s) SubCutaneous every 12 hours  influenza   Vaccine 0.5 milliLiter(s) IntraMuscular once  acetaminophen   Tablet. 975 milliGRAM(s) Oral every 6 hours  vancomycin  IVPB 1500 milliGRAM(s) IV Intermittent every 12 hours    MEDICATIONS  (PRN):  morphine  - Injectable 2 milliGRAM(s) IV Push every 6 hours PRN Severe Pain (7 - 10)  zolpidem 5 milliGRAM(s) Oral at bedtime PRN Insomnia  zolpidem 5 milliGRAM(s) Oral at bedtime PRN Insomnia    12 point ROS negative except as outlined above    Vital Signs Last 24 Hrs  T(C): 36.7 (18 Sep 2017 06:22), Max: 37.1 (17 Sep 2017 18:00)  T(F): 98 (18 Sep 2017 06:22), Max: 98.8 (17 Sep 2017 18:00)  HR: 80 (18 Sep 2017 06:22) (80 - 99)  BP: 127/67 (18 Sep 2017 06:22) (115/55 - 128/72)  BP(mean): --  RR: 18 (18 Sep 2017 06:22) (16 - 18)  SpO2: 100% (18 Sep 2017 06:22) (100% - 100%)    I&O's Summary    16 Sep 2017 07:01  -  17 Sep 2017 07:00  --------------------------------------------------------  IN: 1625 mL / OUT: 1680 mL / NET: -55 mL    17 Sep 2017 07:01  -  18 Sep 2017 06:44  --------------------------------------------------------  IN: 1475 mL / OUT: 26 mL / NET: 1449 mL      PHYSICAL EXAM:    GA: NAD, A+0 x 3  CV: RRR  Pulm: CTA BL  Abd: ( + ) BS, soft, nontender, nondistended, no rebound or guarding  Incision: clean, dry and intact  : No VB; transvaginal jarred drain in place w/ minimal clear yellow serous fluid in bag  Extremities: no swelling or calf tenderness, reflexes +2 bilaterally    LABS:                       8.0    4.83  )-----------( 292      ( 18 Sep 2017 05:24 )             24.1   baso 0.2    eos 1.7    imm gran 0.4    lymph 48.2   mono 12.0   poly 37.5                         8.0    8.47  )-----------( 282      ( 17 Sep 2017 06:17 )             24.9   baso 0.2    eos 0.6    imm gran 0.2    lymph 20.2   mono 9.8    poly 69.0                         8.1    17.28 )-----------( 287      ( 16 Sep 2017 05:45 )             24.6   baso 0.2    eos 0.1    imm gran 0.5    lymph 11.9   mono 10.5   poly 76.8                         10.5   21.99 )-----------( 350      ( 15 Sep 2017 16:05 )             32.6   baso 0.1    eos 0.0    imm gran 0.8    lymph 5.0    mono 8.8    poly 85.3     PT/INR - ( 16 Sep 2017 11:30 )   PT: 13.3 SEC;   INR: 1.18     PTT - ( 16 Sep 2017 11:30 )  PTT:29.9 SEC
INTERVAL HPI/OVERNIGHT EVENTS: Pt seen and examined at bedside.  Pt without complaints this morning.  Ambulating, passing flatus, tolerating regular diet, urinating spontaneously.  Transvaginal Ra drain in place.  Patient says IR did not see her yesterday.  She denies nausea/vomiting/fever/chills/chest pain/SOB/dizziness.    MEDICATIONS  (STANDING):  piperacillin/tazobactam IVPB. 3.375 Gram(s) IV Intermittent every 8 hours  hydroxychloroquine 200 milliGRAM(s) Oral daily  predniSONE   Tablet 10 milliGRAM(s) Oral daily  valsartan 80 milliGRAM(s) Oral daily  mycophenolate mofetil 1500 milliGRAM(s) Oral two times a day  heparin  Injectable 5000 Unit(s) SubCutaneous every 12 hours  influenza   Vaccine 0.5 milliLiter(s) IntraMuscular once  acetaminophen   Tablet. 975 milliGRAM(s) Oral every 6 hours  vancomycin  IVPB 1500 milliGRAM(s) IV Intermittent every 12 hours    MEDICATIONS  (PRN):  morphine  - Injectable 2 milliGRAM(s) IV Push every 6 hours PRN Severe Pain (7 - 10)  zolpidem 5 milliGRAM(s) Oral at bedtime PRN Insomnia  zolpidem 5 milliGRAM(s) Oral at bedtime PRN Insomnia    12 point ROS negative except as outlined above    Vital Signs Last 24 Hrs  T(C): 36.7 (19 Sep 2017 05:26), Max: 36.8 (19 Sep 2017 02:00)  HR: 92 (19 Sep 2017 05:26) (83 - 100)  BP: 117/68 (19 Sep 2017 05:26) (116/56 - 132/81)  RR: 18 (19 Sep 2017 05:26) (17 - 18)  SpO2: 100% (19 Sep 2017 05:26) (97% - 100%)    I&O's Summary    17 Sep 2017 07:01  -  18 Sep 2017 07:00  --------------------------------------------------------  IN: 1475 mL / OUT: 26 mL / NET: 1449 mL    18 Sep 2017 07:01  -  19 Sep 2017 06:48  --------------------------------------------------------  IN: 600 mL / OUT: 155 mL / NET: 445 mL    PHYSICAL EXAM:    GA: NAD, A+0 x 3  CV: RRR  Pulm: CTA BL  Abd: ( + ) BS, soft, nontender, nondistended, no rebound or guarding,   Incision: clean, dry and intact; steri-strips in place  : Ra drain with scant serosanguinous discharge  Extremities: no swelling or calf tenderness, reflexes +2 bilaterally    LABS:                          8.0    4.83  )-----------( 292      ( 18 Sep 2017 05:24 )             24.1   baso 0.2    eos 1.7    imm gran 0.4    lymph 48.2   mono 12.0   poly 37.5                         8.0    8.47  )-----------( 282      ( 17 Sep 2017 06:17 )             24.9   baso 0.2    eos 0.6    imm gran 0.2    lymph 20.2   mono 9.8    poly 69.0
INTERVAL HPI/OVERNIGHT EVENTS: Pt seen and examined at bedside.  Pt w/o complaints this morning.  Ambulating, passing flatus, tolerating regular diet, w/o pain, urinating spontaneously.  She denies nausea/vomiting/fever/chills/chest pain/SOB/dizziness.    MEDICATIONS  (STANDING):  predniSONE   Tablet 10 milliGRAM(s) Oral daily  valsartan 80 milliGRAM(s) Oral daily  mycophenolate mofetil 1500 milliGRAM(s) Oral two times a day  heparin  Injectable 5000 Unit(s) SubCutaneous every 12 hours  influenza   Vaccine 0.5 milliLiter(s) IntraMuscular once  acetaminophen   Tablet. 975 milliGRAM(s) Oral every 6 hours  hydroxychloroquine 400 milliGRAM(s) Oral daily    MEDICATIONS  (PRN):  morphine  - Injectable 2 milliGRAM(s) IV Push every 6 hours PRN Severe Pain (7 - 10)  zolpidem 5 milliGRAM(s) Oral at bedtime PRN Insomnia  zolpidem 5 milliGRAM(s) Oral at bedtime PRN Insomnia    12 point ROS negative except as outlined above    Vital Signs Last 24 Hrs  T(C): 36.6 (20 Sep 2017 05:46), Max: 37 (19 Sep 2017 19:23)  HR: 83 (20 Sep 2017 05:46) (69 - 100)  BP: 116/67 (20 Sep 2017 05:46) (102/62 - 133/77)  RR: 18 (20 Sep 2017 05:46) (16 - 18)  SpO2: 100% (20 Sep 2017 05:46) (99% - 100%)    I&O's Summary    18 Sep 2017 07:01  -  19 Sep 2017 07:00  --------------------------------------------------------  IN: 600 mL / OUT: 605 mL / NET: -5 mL    19 Sep 2017 07:01  -  20 Sep 2017 06:37  --------------------------------------------------------  IN: 1000 mL / OUT: 5 mL / NET: 995 mL    PHYSICAL EXAM:    GA: NAD, A+0 x 3  CV: RRR  Pulm: CTA BL  Abd: ( + ) BS, soft, nontender, nondistended, no rebound or guarding,   Extremities: no swelling or calf tenderness, reflexes +2 bilaterally    LABS:                          8.0    6.20  )-----------( 358      ( 20 Sep 2017 03:20 )             24.6                             8.5    5.26  )-----------( 356      ( 19 Sep 2017 07:35 )             26.2                             8.0    4.83  )-----------( 292      ( 18 Sep 2017 05:24 )             24.1   baso 0.2    eos 1.7    imm gran 0.4    lymph 48.2   mono 12.0   poly 37.5

## 2017-09-21 LAB
BACTERIA BLD CULT: SIGNIFICANT CHANGE UP
BACTERIA BLD CULT: SIGNIFICANT CHANGE UP

## 2017-09-25 LAB — FUNGUS SPEC QL CULT: SIGNIFICANT CHANGE UP

## 2017-09-27 ENCOUNTER — APPOINTMENT (OUTPATIENT)
Dept: OBGYN | Facility: CLINIC | Age: 42
End: 2017-09-27
Payer: COMMERCIAL

## 2017-09-27 VITALS — DIASTOLIC BLOOD PRESSURE: 60 MMHG | SYSTOLIC BLOOD PRESSURE: 104 MMHG

## 2017-09-27 DIAGNOSIS — Z09 ENCOUNTER FOR FOLLOW-UP EXAMINATION AFTER COMPLETED TREATMENT FOR CONDITIONS OTHER THAN MALIGNANT NEOPLASM: ICD-10-CM

## 2017-09-27 PROCEDURE — 99024 POSTOP FOLLOW-UP VISIT: CPT

## 2017-10-09 ENCOUNTER — APPOINTMENT (OUTPATIENT)
Dept: OBGYN | Facility: CLINIC | Age: 42
End: 2017-10-09
Payer: COMMERCIAL

## 2017-10-11 ENCOUNTER — APPOINTMENT (OUTPATIENT)
Dept: OBGYN | Facility: CLINIC | Age: 42
End: 2017-10-11
Payer: COMMERCIAL

## 2017-10-11 VITALS — SYSTOLIC BLOOD PRESSURE: 112 MMHG | DIASTOLIC BLOOD PRESSURE: 62 MMHG

## 2017-10-11 PROCEDURE — 99213 OFFICE O/P EST LOW 20 MIN: CPT

## 2017-10-16 LAB — BACTERIA UR CULT: ABNORMAL

## 2017-10-23 ENCOUNTER — APPOINTMENT (OUTPATIENT)
Dept: OBGYN | Facility: CLINIC | Age: 42
End: 2017-10-23
Payer: COMMERCIAL

## 2017-10-23 DIAGNOSIS — N39.0 URINARY TRACT INFECTION, SITE NOT SPECIFIED: ICD-10-CM

## 2017-10-23 PROCEDURE — 81003 URINALYSIS AUTO W/O SCOPE: CPT | Mod: QW

## 2017-10-23 PROCEDURE — 99213 OFFICE O/P EST LOW 20 MIN: CPT

## 2017-10-26 ENCOUNTER — MOBILE ON CALL (OUTPATIENT)
Age: 42
End: 2017-10-26

## 2017-10-26 LAB
BACTERIA UR CULT: NORMAL
BILIRUB UR QL STRIP: NORMAL
GLUCOSE UR-MCNC: NORMAL
HCG UR QL: 0.2 EU/DL
HGB UR QL STRIP.AUTO: NORMAL
KETONES UR-MCNC: NORMAL
LEUKOCYTE ESTERASE UR QL STRIP: NORMAL
NITRITE UR QL STRIP: NORMAL
PH UR STRIP: 6.5
PROT UR STRIP-MCNC: NORMAL
SP GR UR STRIP: 1.01

## 2017-10-30 ENCOUNTER — APPOINTMENT (OUTPATIENT)
Dept: OBGYN | Facility: CLINIC | Age: 42
End: 2017-10-30
Payer: COMMERCIAL

## 2017-10-30 VITALS — SYSTOLIC BLOOD PRESSURE: 124 MMHG | DIASTOLIC BLOOD PRESSURE: 74 MMHG

## 2017-10-30 DIAGNOSIS — R39.89 OTHER SYMPTOMS AND SIGNS INVOLVING THE GENITOURINARY SYSTEM: ICD-10-CM

## 2017-10-30 PROCEDURE — 99213 OFFICE O/P EST LOW 20 MIN: CPT

## 2017-11-01 ENCOUNTER — RESULT REVIEW (OUTPATIENT)
Age: 42
End: 2017-11-01

## 2017-11-01 LAB
CANDIDA VAG CYTO: NOT DETECTED
G VAGINALIS+PREV SP MTYP VAG QL MICRO: DETECTED
T VAGINALIS VAG QL WET PREP: NOT DETECTED

## 2017-12-21 ENCOUNTER — APPOINTMENT (OUTPATIENT)
Dept: OBGYN | Facility: CLINIC | Age: 42
End: 2017-12-21
Payer: COMMERCIAL

## 2017-12-21 VITALS
HEIGHT: 63 IN | WEIGHT: 155 LBS | SYSTOLIC BLOOD PRESSURE: 144 MMHG | BODY MASS INDEX: 27.46 KG/M2 | DIASTOLIC BLOOD PRESSURE: 82 MMHG

## 2017-12-21 DIAGNOSIS — B96.89 ACUTE VAGINITIS: ICD-10-CM

## 2017-12-21 DIAGNOSIS — N76.0 ACUTE VAGINITIS: ICD-10-CM

## 2017-12-21 PROCEDURE — 99213 OFFICE O/P EST LOW 20 MIN: CPT

## 2018-01-31 ENCOUNTER — APPOINTMENT (OUTPATIENT)
Dept: OBGYN | Facility: CLINIC | Age: 43
End: 2018-01-31
Payer: COMMERCIAL

## 2018-01-31 ENCOUNTER — LABORATORY RESULT (OUTPATIENT)
Age: 43
End: 2018-01-31

## 2018-01-31 VITALS — SYSTOLIC BLOOD PRESSURE: 114 MMHG | DIASTOLIC BLOOD PRESSURE: 70 MMHG

## 2018-01-31 PROCEDURE — 36415 COLL VENOUS BLD VENIPUNCTURE: CPT

## 2018-01-31 PROCEDURE — 99213 OFFICE O/P EST LOW 20 MIN: CPT

## 2018-02-02 LAB
HCV AB SER QL: NONREACTIVE
HCV S/CO RATIO: 0.06 S/CO
HIV1+2 AB SPEC QL IA.RAPID: NONREACTIVE
T PALLIDUM AB SER QL IA: NEGATIVE

## 2018-06-21 ENCOUNTER — LABORATORY RESULT (OUTPATIENT)
Age: 43
End: 2018-06-21

## 2018-06-21 ENCOUNTER — APPOINTMENT (OUTPATIENT)
Dept: RHEUMATOLOGY | Facility: CLINIC | Age: 43
End: 2018-06-21
Payer: COMMERCIAL

## 2018-06-21 VITALS
SYSTOLIC BLOOD PRESSURE: 114 MMHG | OXYGEN SATURATION: 99 % | BODY MASS INDEX: 27.64 KG/M2 | WEIGHT: 156 LBS | HEART RATE: 85 BPM | TEMPERATURE: 97.8 F | HEIGHT: 63 IN | DIASTOLIC BLOOD PRESSURE: 79 MMHG

## 2018-06-21 LAB
25(OH)D3 SERPL-MCNC: 39.5 NG/ML
ALBUMIN SERPL ELPH-MCNC: 3.9 G/DL
ALP BLD-CCNC: 64 U/L
ALT SERPL-CCNC: 10 U/L
ANION GAP SERPL CALC-SCNC: 15 MMOL/L
APPEARANCE: CLEAR
APTT BLD: 30.9 SEC
AST SERPL-CCNC: 17 U/L
BACTERIA: NEGATIVE
BASOPHILS # BLD AUTO: 0.01 K/UL
BASOPHILS NFR BLD AUTO: 0.2 %
BILIRUB SERPL-MCNC: 0.6 MG/DL
BILIRUBIN URINE: NEGATIVE
BLOOD URINE: NEGATIVE
BUN SERPL-MCNC: 8 MG/DL
C3 SERPL-MCNC: 136 MG/DL
C4 SERPL-MCNC: 19 MG/DL
CALCIUM SERPL-MCNC: 9.4 MG/DL
CHLORIDE SERPL-SCNC: 105 MMOL/L
CK SERPL-CCNC: 65 U/L
CO2 SERPL-SCNC: 22 MMOL/L
COLOR: YELLOW
CREAT SERPL-MCNC: 0.7 MG/DL
EOSINOPHIL # BLD AUTO: 0.02 K/UL
EOSINOPHIL NFR BLD AUTO: 0.3 %
GLUCOSE QUALITATIVE U: NEGATIVE MG/DL
GLUCOSE SERPL-MCNC: 89 MG/DL
HCT VFR BLD CALC: 37.9 %
HGB BLD-MCNC: 12.5 G/DL
HYALINE CASTS: 4 /LPF
IMM GRANULOCYTES NFR BLD AUTO: 0.2 %
INR PPP: 0.96 RATIO
KETONES URINE: NEGATIVE
LEUKOCYTE ESTERASE URINE: NEGATIVE
LYMPHOCYTES # BLD AUTO: 1.92 K/UL
LYMPHOCYTES NFR BLD AUTO: 29.5 %
MAN DIFF?: NORMAL
MCHC RBC-ENTMCNC: 28 PG
MCHC RBC-ENTMCNC: 33 GM/DL
MCV RBC AUTO: 84.8 FL
MICROSCOPIC-UA: NORMAL
MONOCYTES # BLD AUTO: 0.56 K/UL
MONOCYTES NFR BLD AUTO: 8.6 %
NEUTROPHILS # BLD AUTO: 3.98 K/UL
NEUTROPHILS NFR BLD AUTO: 61.2 %
NITRITE URINE: NEGATIVE
PH URINE: 5.5
PLATELET # BLD AUTO: 221 K/UL
POTASSIUM SERPL-SCNC: 4.5 MMOL/L
PROT SERPL-MCNC: 7 G/DL
PROTEIN URINE: 100 MG/DL
PT BLD: 10.8 SEC
RBC # BLD: 4.47 M/UL
RBC # FLD: 13.3 %
RED BLOOD CELLS URINE: 3 /HPF
SODIUM SERPL-SCNC: 142 MMOL/L
SPECIFIC GRAVITY URINE: 1.02
SQUAMOUS EPITHELIAL CELLS: 5 /HPF
UROBILINOGEN URINE: NEGATIVE MG/DL
WBC # FLD AUTO: 6.5 K/UL
WHITE BLOOD CELLS URINE: 2 /HPF

## 2018-06-21 PROCEDURE — 99245 OFF/OP CONSLTJ NEW/EST HI 55: CPT

## 2018-06-22 LAB
CREAT SPEC-SCNC: 240 MG/DL
CREAT/PROT UR: 0.4 RATIO
DSDNA AB SER-ACNC: 75 IU/ML
ENA RNP AB SER IA-ACNC: 1.4 AL
ENA SM AB SER IA-ACNC: 2.4 AL
ENA SS-A AB SER IA-ACNC: <0.2 AL
ENA SS-B AB SER IA-ACNC: <0.2 AL
MPO AB + PR3 PNL SER: NORMAL
PROT UR-MCNC: 91 MG/DL

## 2018-06-24 LAB
B2 GLYCOPROT1 AB SER QL: NEGATIVE
CARDIOLIPIN AB SER IA-ACNC: NEGATIVE

## 2018-06-27 LAB
ANA PAT FLD IF-IMP: ABNORMAL
ANA SER IF-ACNC: ABNORMAL
B2 GLYCOPROT1 IGA SERPL IA-ACNC: 52.5 SAU

## 2018-07-02 ENCOUNTER — MEDICATION RENEWAL (OUTPATIENT)
Age: 43
End: 2018-07-02

## 2018-07-21 ENCOUNTER — TRANSCRIPTION ENCOUNTER (OUTPATIENT)
Age: 43
End: 2018-07-21

## 2018-07-21 ENCOUNTER — APPOINTMENT (OUTPATIENT)
Dept: MAMMOGRAPHY | Facility: IMAGING CENTER | Age: 43
End: 2018-07-21

## 2018-07-24 ENCOUNTER — APPOINTMENT (OUTPATIENT)
Dept: OPHTHALMOLOGY | Facility: CLINIC | Age: 43
End: 2018-07-24
Payer: COMMERCIAL

## 2018-07-24 PROCEDURE — 92025 CPTRIZED CORNEAL TOPOGRAPHY: CPT

## 2018-07-24 PROCEDURE — 99244 OFF/OP CNSLTJ NEW/EST MOD 40: CPT

## 2018-07-24 PROCEDURE — 92134 CPTRZ OPH DX IMG PST SGM RTA: CPT

## 2018-07-24 PROCEDURE — 92285 EXTERNAL OCULAR PHOTOGRAPHY: CPT

## 2018-07-27 ENCOUNTER — FORM ENCOUNTER (OUTPATIENT)
Age: 43
End: 2018-07-27

## 2018-07-27 ENCOUNTER — APPOINTMENT (OUTPATIENT)
Dept: OPHTHALMOLOGY | Facility: CLINIC | Age: 43
End: 2018-07-27
Payer: COMMERCIAL

## 2018-07-27 PROCEDURE — 92012 INTRM OPH EXAM EST PATIENT: CPT

## 2018-07-28 ENCOUNTER — APPOINTMENT (OUTPATIENT)
Dept: ULTRASOUND IMAGING | Facility: IMAGING CENTER | Age: 43
End: 2018-07-28
Payer: COMMERCIAL

## 2018-07-28 ENCOUNTER — APPOINTMENT (OUTPATIENT)
Dept: MAMMOGRAPHY | Facility: IMAGING CENTER | Age: 43
End: 2018-07-28
Payer: COMMERCIAL

## 2018-07-28 ENCOUNTER — OUTPATIENT (OUTPATIENT)
Dept: OUTPATIENT SERVICES | Facility: HOSPITAL | Age: 43
LOS: 1 days | End: 2018-07-28
Payer: COMMERCIAL

## 2018-07-28 DIAGNOSIS — Z00.8 ENCOUNTER FOR OTHER GENERAL EXAMINATION: ICD-10-CM

## 2018-07-28 PROCEDURE — 77063 BREAST TOMOSYNTHESIS BI: CPT | Mod: 26

## 2018-07-28 PROCEDURE — 77063 BREAST TOMOSYNTHESIS BI: CPT

## 2018-07-28 PROCEDURE — 77067 SCR MAMMO BI INCL CAD: CPT

## 2018-07-28 PROCEDURE — 76641 ULTRASOUND BREAST COMPLETE: CPT | Mod: 26,50

## 2018-07-28 PROCEDURE — 76641 ULTRASOUND BREAST COMPLETE: CPT

## 2018-07-28 PROCEDURE — 77067 SCR MAMMO BI INCL CAD: CPT | Mod: 26

## 2018-08-05 ENCOUNTER — FORM ENCOUNTER (OUTPATIENT)
Age: 43
End: 2018-08-05

## 2018-08-06 ENCOUNTER — APPOINTMENT (OUTPATIENT)
Dept: MAMMOGRAPHY | Facility: IMAGING CENTER | Age: 43
End: 2018-08-06
Payer: COMMERCIAL

## 2018-08-06 ENCOUNTER — OUTPATIENT (OUTPATIENT)
Dept: OUTPATIENT SERVICES | Facility: HOSPITAL | Age: 43
LOS: 1 days | End: 2018-08-06
Payer: COMMERCIAL

## 2018-08-06 ENCOUNTER — APPOINTMENT (OUTPATIENT)
Dept: ULTRASOUND IMAGING | Facility: IMAGING CENTER | Age: 43
End: 2018-08-06
Payer: COMMERCIAL

## 2018-08-06 DIAGNOSIS — Z00.8 ENCOUNTER FOR OTHER GENERAL EXAMINATION: ICD-10-CM

## 2018-08-06 PROCEDURE — 77065 DX MAMMO INCL CAD UNI: CPT | Mod: 26,RT

## 2018-08-06 PROCEDURE — 77065 DX MAMMO INCL CAD UNI: CPT

## 2018-08-06 PROCEDURE — 76642 ULTRASOUND BREAST LIMITED: CPT | Mod: 26,RT

## 2018-08-06 PROCEDURE — 76642 ULTRASOUND BREAST LIMITED: CPT

## 2018-08-07 ENCOUNTER — FORM ENCOUNTER (OUTPATIENT)
Age: 43
End: 2018-08-07

## 2018-08-08 ENCOUNTER — APPOINTMENT (OUTPATIENT)
Dept: MAMMOGRAPHY | Facility: IMAGING CENTER | Age: 43
End: 2018-08-08
Payer: COMMERCIAL

## 2018-08-08 ENCOUNTER — OUTPATIENT (OUTPATIENT)
Dept: OUTPATIENT SERVICES | Facility: HOSPITAL | Age: 43
LOS: 1 days | End: 2018-08-08
Payer: COMMERCIAL

## 2018-08-08 ENCOUNTER — RESULT REVIEW (OUTPATIENT)
Age: 43
End: 2018-08-08

## 2018-08-08 DIAGNOSIS — R92.8 OTHER ABNORMAL AND INCONCLUSIVE FINDINGS ON DIAGNOSTIC IMAGING OF BREAST: ICD-10-CM

## 2018-08-08 PROCEDURE — A4648: CPT

## 2018-08-08 PROCEDURE — 77065 DX MAMMO INCL CAD UNI: CPT

## 2018-08-08 PROCEDURE — 88305 TISSUE EXAM BY PATHOLOGIST: CPT

## 2018-08-08 PROCEDURE — 88305 TISSUE EXAM BY PATHOLOGIST: CPT | Mod: 26

## 2018-08-08 PROCEDURE — 19081 BX BREAST 1ST LESION STRTCTC: CPT

## 2018-08-08 PROCEDURE — 19081 BX BREAST 1ST LESION STRTCTC: CPT | Mod: RT

## 2018-08-08 PROCEDURE — 77065 DX MAMMO INCL CAD UNI: CPT | Mod: 26,RT

## 2018-08-10 ENCOUNTER — APPOINTMENT (OUTPATIENT)
Dept: OPHTHALMOLOGY | Facility: CLINIC | Age: 43
End: 2018-08-10
Payer: COMMERCIAL

## 2018-08-10 DIAGNOSIS — Z86.19 PERSONAL HISTORY OF OTHER INFECTIOUS AND PARASITIC DISEASES: ICD-10-CM

## 2018-08-10 LAB — SURGICAL PATHOLOGY STUDY: SIGNIFICANT CHANGE UP

## 2018-08-10 PROCEDURE — 92012 INTRM OPH EXAM EST PATIENT: CPT

## 2018-08-24 ENCOUNTER — APPOINTMENT (OUTPATIENT)
Dept: OPHTHALMOLOGY | Facility: CLINIC | Age: 43
End: 2018-08-24
Payer: COMMERCIAL

## 2018-08-24 PROCEDURE — 92012 INTRM OPH EXAM EST PATIENT: CPT

## 2018-09-04 ENCOUNTER — OUTPATIENT (OUTPATIENT)
Dept: OUTPATIENT SERVICES | Facility: HOSPITAL | Age: 43
LOS: 1 days | End: 2018-09-04
Payer: COMMERCIAL

## 2018-09-04 ENCOUNTER — APPOINTMENT (OUTPATIENT)
Dept: RADIOLOGY | Facility: IMAGING CENTER | Age: 43
End: 2018-09-04
Payer: COMMERCIAL

## 2018-09-04 DIAGNOSIS — Z00.8 ENCOUNTER FOR OTHER GENERAL EXAMINATION: ICD-10-CM

## 2018-09-04 PROCEDURE — 77080 DXA BONE DENSITY AXIAL: CPT | Mod: 26

## 2018-09-04 PROCEDURE — 77080 DXA BONE DENSITY AXIAL: CPT

## 2018-09-28 ENCOUNTER — APPOINTMENT (OUTPATIENT)
Dept: OPHTHALMOLOGY | Facility: CLINIC | Age: 43
End: 2018-09-28
Payer: COMMERCIAL

## 2018-09-28 PROCEDURE — 92012 INTRM OPH EXAM EST PATIENT: CPT

## 2018-10-16 ENCOUNTER — APPOINTMENT (OUTPATIENT)
Dept: RHEUMATOLOGY | Facility: CLINIC | Age: 43
End: 2018-10-16
Payer: COMMERCIAL

## 2018-10-16 VITALS
BODY MASS INDEX: 27.46 KG/M2 | OXYGEN SATURATION: 98 % | HEART RATE: 82 BPM | WEIGHT: 155 LBS | TEMPERATURE: 97.7 F | SYSTOLIC BLOOD PRESSURE: 118 MMHG | DIASTOLIC BLOOD PRESSURE: 72 MMHG | RESPIRATION RATE: 16 BRPM | HEIGHT: 63 IN

## 2018-10-16 PROCEDURE — 99214 OFFICE O/P EST MOD 30 MIN: CPT | Mod: 25

## 2018-10-16 PROCEDURE — G0009: CPT

## 2018-10-16 PROCEDURE — 90670 PCV13 VACCINE IM: CPT

## 2018-10-17 LAB
ALBUMIN SERPL ELPH-MCNC: 4.2 G/DL
ALP BLD-CCNC: 77 U/L
ALT SERPL-CCNC: 10 U/L
ANION GAP SERPL CALC-SCNC: 14 MMOL/L
APPEARANCE: CLEAR
AST SERPL-CCNC: 16 U/L
BACTERIA: ABNORMAL
BASOPHILS # BLD AUTO: 0.02 K/UL
BASOPHILS NFR BLD AUTO: 0.3 %
BILIRUB SERPL-MCNC: 0.3 MG/DL
BILIRUBIN URINE: NEGATIVE
BLOOD URINE: NEGATIVE
BUN SERPL-MCNC: 14 MG/DL
C3 SERPL-MCNC: 118 MG/DL
C4 SERPL-MCNC: 15 MG/DL
CALCIUM SERPL-MCNC: 9.1 MG/DL
CHLORIDE SERPL-SCNC: 101 MMOL/L
CK SERPL-CCNC: 58 U/L
CO2 SERPL-SCNC: 24 MMOL/L
COLOR: YELLOW
CREAT SERPL-MCNC: 0.69 MG/DL
CREAT SPEC-SCNC: 19 MG/DL
CREAT/PROT UR: 0.8 RATIO
DSDNA AB SER-ACNC: 89 IU/ML
EOSINOPHIL # BLD AUTO: 0.02 K/UL
EOSINOPHIL NFR BLD AUTO: 0.3 %
GLUCOSE QUALITATIVE U: NEGATIVE MG/DL
GLUCOSE SERPL-MCNC: 82 MG/DL
HCT VFR BLD CALC: 39.3 %
HGB BLD-MCNC: 13.2 G/DL
HYALINE CASTS: 0 /LPF
IMM GRANULOCYTES NFR BLD AUTO: 0.3 %
KETONES URINE: NEGATIVE
LEUKOCYTE ESTERASE URINE: NEGATIVE
LYMPHOCYTES # BLD AUTO: 2.51 K/UL
LYMPHOCYTES NFR BLD AUTO: 33.3 %
MAN DIFF?: NORMAL
MCHC RBC-ENTMCNC: 29.3 PG
MCHC RBC-ENTMCNC: 33.6 GM/DL
MCV RBC AUTO: 87.1 FL
MICROSCOPIC-UA: NORMAL
MONOCYTES # BLD AUTO: 0.73 K/UL
MONOCYTES NFR BLD AUTO: 9.7 %
NEUTROPHILS # BLD AUTO: 4.23 K/UL
NEUTROPHILS NFR BLD AUTO: 56.1 %
NITRITE URINE: NEGATIVE
PH URINE: 6.5
PLATELET # BLD AUTO: 232 K/UL
POTASSIUM SERPL-SCNC: 4.1 MMOL/L
PROT SERPL-MCNC: 7 G/DL
PROT UR-MCNC: 15 MG/DL
PROTEIN URINE: ABNORMAL MG/DL
RBC # BLD: 4.51 M/UL
RBC # FLD: 13.1 %
RED BLOOD CELLS URINE: 0 /HPF
SODIUM SERPL-SCNC: 139 MMOL/L
SPECIFIC GRAVITY URINE: 1.01
SQUAMOUS EPITHELIAL CELLS: 6 /HPF
UROBILINOGEN URINE: NEGATIVE MG/DL
WBC # FLD AUTO: 7.53 K/UL
WHITE BLOOD CELLS URINE: 1 /HPF

## 2018-10-19 ENCOUNTER — APPOINTMENT (OUTPATIENT)
Dept: OPHTHALMOLOGY | Facility: CLINIC | Age: 43
End: 2018-10-19
Payer: COMMERCIAL

## 2018-10-19 PROCEDURE — 92285 EXTERNAL OCULAR PHOTOGRAPHY: CPT

## 2018-10-19 PROCEDURE — 92012 INTRM OPH EXAM EST PATIENT: CPT

## 2018-10-25 ENCOUNTER — APPOINTMENT (OUTPATIENT)
Dept: OPHTHALMOLOGY | Facility: CLINIC | Age: 43
End: 2018-10-25

## 2018-11-26 ENCOUNTER — MEDICATION RENEWAL (OUTPATIENT)
Age: 43
End: 2018-11-26

## 2018-12-04 ENCOUNTER — MEDICATION RENEWAL (OUTPATIENT)
Age: 43
End: 2018-12-04

## 2018-12-05 ENCOUNTER — APPOINTMENT (OUTPATIENT)
Dept: OBGYN | Facility: CLINIC | Age: 43
End: 2018-12-05
Payer: COMMERCIAL

## 2018-12-05 VITALS — DIASTOLIC BLOOD PRESSURE: 72 MMHG | SYSTOLIC BLOOD PRESSURE: 116 MMHG

## 2018-12-05 PROCEDURE — 99213 OFFICE O/P EST LOW 20 MIN: CPT

## 2018-12-06 ENCOUNTER — MEDICATION RENEWAL (OUTPATIENT)
Age: 43
End: 2018-12-06

## 2018-12-06 LAB
C TRACH RRNA SPEC QL NAA+PROBE: NOT DETECTED
CANDIDA VAG CYTO: NOT DETECTED
G VAGINALIS+PREV SP MTYP VAG QL MICRO: DETECTED
N GONORRHOEA RRNA SPEC QL NAA+PROBE: NOT DETECTED
SOURCE AMPLIFICATION: NORMAL
T VAGINALIS VAG QL WET PREP: NOT DETECTED

## 2019-01-04 ENCOUNTER — APPOINTMENT (OUTPATIENT)
Dept: OPHTHALMOLOGY | Facility: CLINIC | Age: 44
End: 2019-01-04
Payer: COMMERCIAL

## 2019-01-04 PROCEDURE — 92012 INTRM OPH EXAM EST PATIENT: CPT

## 2019-01-15 ENCOUNTER — APPOINTMENT (OUTPATIENT)
Dept: RHEUMATOLOGY | Facility: CLINIC | Age: 44
End: 2019-01-15
Payer: COMMERCIAL

## 2019-01-15 PROCEDURE — 99214 OFFICE O/P EST MOD 30 MIN: CPT

## 2019-01-16 LAB
ALBUMIN SERPL ELPH-MCNC: 4.5 G/DL
ALP BLD-CCNC: 65 U/L
ALT SERPL-CCNC: 10 U/L
ANION GAP SERPL CALC-SCNC: 14 MMOL/L
APPEARANCE: CLEAR
AST SERPL-CCNC: 19 U/L
BACTERIA: ABNORMAL
BASOPHILS # BLD AUTO: 0.03 K/UL
BASOPHILS NFR BLD AUTO: 0.5 %
BILIRUB SERPL-MCNC: 0.2 MG/DL
BILIRUBIN URINE: NEGATIVE
BLOOD URINE: NEGATIVE
BUN SERPL-MCNC: 10 MG/DL
C3 SERPL-MCNC: 112 MG/DL
C4 SERPL-MCNC: 15 MG/DL
CALCIUM SERPL-MCNC: 9.8 MG/DL
CHLORIDE SERPL-SCNC: 102 MMOL/L
CK SERPL-CCNC: 68 U/L
CO2 SERPL-SCNC: 24 MMOL/L
COLOR: YELLOW
CREAT SERPL-MCNC: 0.66 MG/DL
CREAT SPEC-SCNC: 155 MG/DL
CREAT/PROT UR: 0.5 RATIO
EOSINOPHIL # BLD AUTO: 0.01 K/UL
EOSINOPHIL NFR BLD AUTO: 0.2 %
GLUCOSE QUALITATIVE U: NEGATIVE MG/DL
GLUCOSE SERPL-MCNC: 69 MG/DL
HCT VFR BLD CALC: 38.7 %
HGB BLD-MCNC: 12.9 G/DL
HYALINE CASTS: 4 /LPF
IMM GRANULOCYTES NFR BLD AUTO: 0.2 %
KETONES URINE: NEGATIVE
LEUKOCYTE ESTERASE URINE: NEGATIVE
LYMPHOCYTES # BLD AUTO: 2.87 K/UL
LYMPHOCYTES NFR BLD AUTO: 45.6 %
MAN DIFF?: NORMAL
MCHC RBC-ENTMCNC: 28.7 PG
MCHC RBC-ENTMCNC: 33.3 GM/DL
MCV RBC AUTO: 86.2 FL
MICROSCOPIC-UA: NORMAL
MONOCYTES # BLD AUTO: 0.6 K/UL
MONOCYTES NFR BLD AUTO: 9.5 %
NEUTROPHILS # BLD AUTO: 2.78 K/UL
NEUTROPHILS NFR BLD AUTO: 44 %
NITRITE URINE: NEGATIVE
PH URINE: 5.5
PLATELET # BLD AUTO: 245 K/UL
POTASSIUM SERPL-SCNC: 4.9 MMOL/L
PROT SERPL-MCNC: 7.3 G/DL
PROT UR-MCNC: 70 MG/DL
PROTEIN URINE: 100 MG/DL
RBC # BLD: 4.49 M/UL
RBC # FLD: 12.8 %
RED BLOOD CELLS URINE: 3 /HPF
SODIUM SERPL-SCNC: 140 MMOL/L
SPECIFIC GRAVITY URINE: 1.02
SQUAMOUS EPITHELIAL CELLS: 11 /HPF
UROBILINOGEN URINE: NEGATIVE MG/DL
WBC # FLD AUTO: 6.3 K/UL
WHITE BLOOD CELLS URINE: 4 /HPF

## 2019-01-16 NOTE — ASSESSMENT
[FreeTextEntry1] : 1.  SLE: Onset in 2012 with fatigue, fever, arthritis, and nephritis.  A kidney biopsy was performed around 2013 (results unknown).  She was treated with MMF 3 g/d and did well until 2016 at which time she had a  flare (extra-renal: malar rash, SLE in the scalp, Raynauds, alopecia).  Plaquenil and steroids were added.  Since that time she has done quite well.  She has not had cardiopulmonary involvement, oral ulcers, thrombosis. \par 2.  OB history:  uncomplicated pregnancy at age 16\par \par Plan:\par 1.  Lab tests\par 2.  Contact me one week\par 3.  Return 3 months\par 4.  Reduce Plaquenil 400 mg 5/7 and 200 mg 2/7 days\par 5.  If labs OK, stop prednisone

## 2019-01-16 NOTE — PHYSICAL EXAM
[General Appearance - Alert] : alert [General Appearance - In No Acute Distress] : in no acute distress [General Appearance - Well Nourished] : well nourished [General Appearance - Well Developed] : well developed [Sclera] : the sclera and conjunctiva were normal [Extraocular Movements] : extraocular movements were intact [Strabismus] : no strabismus was seen [Outer Ear] : the ears and nose were normal in appearance [Oropharynx] : the oropharynx was normal [Neck Appearance] : the appearance of the neck was normal [Neck Cervical Mass (___cm)] : no neck mass was observed [Jugular Venous Distention Increased] : there was no jugular-venous distention [Thyroid Diffuse Enlargement] : the thyroid was not enlarged [Auscultation Breath Sounds / Voice Sounds] : lungs were clear to auscultation bilaterally [Heart Rate And Rhythm] : heart rate was normal and rhythm regular [Heart Sounds] : normal S1 and S2 [Heart Sounds Gallop] : no gallops [Murmurs] : no murmurs [Heart Sounds Pericardial Friction Rub] : no pericardial rub [Bowel Sounds] : normal bowel sounds [Abdomen Soft] : soft [Abdomen Tenderness] : non-tender [Abdomen Mass (___ Cm)] : no abdominal mass palpated [Cervical Lymph Nodes Enlarged Posterior Bilaterally] : posterior cervical [Cervical Lymph Nodes Enlarged Anterior Bilaterally] : anterior cervical [Supraclavicular Lymph Nodes Enlarged Bilaterally] : supraclavicular [No CVA Tenderness] : no ~M costovertebral angle tenderness [No Spinal Tenderness] : no spinal tenderness [Abnormal Walk] : normal gait [Nail Clubbing] : no clubbing  or cyanosis of the fingernails [Musculoskeletal - Swelling] : no joint swelling seen [Motor Tone] : muscle strength and tone were normal [Skin Color & Pigmentation] : normal skin color and pigmentation [Skin Turgor] : normal skin turgor [] : no rash [Sensation] : the sensory exam was normal to light touch and pinprick [Motor Exam] : the motor exam was normal [Oriented To Time, Place, And Person] : oriented to person, place, and time [Impaired Insight] : insight and judgment were intact [Affect] : the affect was normal

## 2019-01-16 NOTE — HISTORY OF PRESENT ILLNESS
[FreeTextEntry1] : 1.  SLE: Onset in 2012 with fatigue, fever, arthritis, and nephritis.  A kidney biopsy was performed around 2013 (results unknown).  She was treated with MMF 3 g/d and did well until 2016 at which time she had a  flare (extra-renal: malar rash, SLE in the scalp, Raynauds, alopecia).  Plaquenil and steroids were added.  Since that time she has done quite well.  She has not had cardiopulmonary involvement, oral ulcers, thrombosis. \par 2.  OB history:  uncomplicated pregnancy at age 16\par \par Meds\par Probiotic\par Plaquenil 400 mg/d\par CellCept 1.5 gm/d (was supposed to be taking 3 gm but she generally skipped the second dose of the day)\par prednisone 2.5 mg/d\par \par Vaccine\par Prevnar 13 10/16/18 ([id number]; exp 2/20)

## 2019-01-17 LAB
25(OH)D3 SERPL-MCNC: 24.6 NG/ML
DSDNA AB SER-ACNC: 90 IU/ML
ENA RNP AB SER IA-ACNC: 1.2 AL
ENA SM AB SER IA-ACNC: 2.1 AL
ENA SS-A AB SER IA-ACNC: <0.2 AL
ENA SS-B AB SER IA-ACNC: <0.2 AL

## 2019-03-14 ENCOUNTER — FORM ENCOUNTER (OUTPATIENT)
Age: 44
End: 2019-03-14

## 2019-03-15 ENCOUNTER — OUTPATIENT (OUTPATIENT)
Dept: OUTPATIENT SERVICES | Facility: HOSPITAL | Age: 44
LOS: 1 days | End: 2019-03-15
Payer: COMMERCIAL

## 2019-03-15 ENCOUNTER — APPOINTMENT (OUTPATIENT)
Dept: ULTRASOUND IMAGING | Facility: IMAGING CENTER | Age: 44
End: 2019-03-15
Payer: COMMERCIAL

## 2019-03-15 ENCOUNTER — TRANSCRIPTION ENCOUNTER (OUTPATIENT)
Age: 44
End: 2019-03-15

## 2019-03-15 ENCOUNTER — APPOINTMENT (OUTPATIENT)
Dept: MAMMOGRAPHY | Facility: IMAGING CENTER | Age: 44
End: 2019-03-15
Payer: COMMERCIAL

## 2019-03-15 DIAGNOSIS — Z00.8 ENCOUNTER FOR OTHER GENERAL EXAMINATION: ICD-10-CM

## 2019-03-15 PROCEDURE — 76642 ULTRASOUND BREAST LIMITED: CPT | Mod: 26,RT

## 2019-03-15 PROCEDURE — 77065 DX MAMMO INCL CAD UNI: CPT | Mod: 26,RT

## 2019-03-15 PROCEDURE — 76642 ULTRASOUND BREAST LIMITED: CPT

## 2019-03-15 PROCEDURE — G0279: CPT | Mod: 26

## 2019-03-15 PROCEDURE — G0279: CPT

## 2019-03-15 PROCEDURE — 77065 DX MAMMO INCL CAD UNI: CPT

## 2019-04-01 ENCOUNTER — TRANSCRIPTION ENCOUNTER (OUTPATIENT)
Age: 44
End: 2019-04-01

## 2019-04-16 ENCOUNTER — APPOINTMENT (OUTPATIENT)
Dept: OPHTHALMOLOGY | Facility: CLINIC | Age: 44
End: 2019-04-16
Payer: COMMERCIAL

## 2019-04-16 DIAGNOSIS — H17.9 UNSPECIFIED CORNEAL SCAR AND OPACITY: ICD-10-CM

## 2019-04-16 PROCEDURE — 92285 EXTERNAL OCULAR PHOTOGRAPHY: CPT

## 2019-04-16 PROCEDURE — 92012 INTRM OPH EXAM EST PATIENT: CPT

## 2019-04-25 ENCOUNTER — APPOINTMENT (OUTPATIENT)
Dept: OPHTHALMOLOGY | Facility: CLINIC | Age: 44
End: 2019-04-25
Payer: COMMERCIAL

## 2019-04-25 DIAGNOSIS — B00.52 HERPESVIRAL KERATITIS: ICD-10-CM

## 2019-04-25 PROCEDURE — 92012 INTRM OPH EXAM EST PATIENT: CPT

## 2019-04-25 RX ORDER — OFLOXACIN 3 MG/ML
0.3 SOLUTION/ DROPS OPHTHALMIC 4 TIMES DAILY
Qty: 1 | Refills: 5 | Status: DISCONTINUED | COMMUNITY
Start: 2018-07-27 | End: 2019-04-25

## 2019-04-25 RX ORDER — METRONIDAZOLE 500 MG/1
500 TABLET ORAL TWICE DAILY
Qty: 8 | Refills: 0 | Status: DISCONTINUED | COMMUNITY
Start: 2017-09-15 | End: 2019-04-25

## 2019-04-25 RX ORDER — PREDNISONE 2.5 MG/1
2.5 TABLET ORAL DAILY
Qty: 90 | Refills: 3 | Status: DISCONTINUED | COMMUNITY
Start: 2018-07-02 | End: 2019-04-25

## 2019-04-25 RX ORDER — LEUPROLIDE ACETATE 11.25 MG
11.25 KIT INTRAMUSCULAR
Qty: 1 | Refills: 3 | Status: DISCONTINUED | COMMUNITY
Start: 2017-05-01 | End: 2019-04-25

## 2019-04-25 RX ORDER — NEOMYCIN SULFATE, POLYMYXIN B SULFATE AND DEXAMETHASONE 3.5; 10000; 1 MG/ML; [USP'U]/ML; MG/ML
3.5-10000-0.1 SUSPENSION OPHTHALMIC TWICE DAILY
Qty: 1 | Refills: 3 | Status: DISCONTINUED | COMMUNITY
Start: 2018-08-10 | End: 2019-04-25

## 2019-04-25 RX ORDER — CEPHALEXIN 250 MG/1
250 CAPSULE ORAL
Qty: 8 | Refills: 0 | Status: DISCONTINUED | COMMUNITY
Start: 2017-10-11 | End: 2019-04-25

## 2019-04-25 RX ORDER — PREDNISONE 5 MG/1
5 TABLET ORAL
Qty: 5 | Refills: 0 | Status: DISCONTINUED | COMMUNITY
Start: 2017-02-20 | End: 2019-04-25

## 2019-04-25 RX ORDER — DOXYCYCLINE HYCLATE 100 MG/1
100 TABLET ORAL TWICE DAILY
Qty: 1 | Refills: 0 | Status: DISCONTINUED | COMMUNITY
Start: 2017-09-15 | End: 2019-04-25

## 2019-05-28 ENCOUNTER — APPOINTMENT (OUTPATIENT)
Dept: RHEUMATOLOGY | Facility: CLINIC | Age: 44
End: 2019-05-28
Payer: COMMERCIAL

## 2019-05-28 ENCOUNTER — APPOINTMENT (OUTPATIENT)
Dept: OPHTHALMOLOGY | Facility: CLINIC | Age: 44
End: 2019-05-28

## 2019-05-28 VITALS
TEMPERATURE: 97.9 F | DIASTOLIC BLOOD PRESSURE: 74 MMHG | HEART RATE: 80 BPM | OXYGEN SATURATION: 98 % | WEIGHT: 161 LBS | HEIGHT: 63 IN | BODY MASS INDEX: 28.53 KG/M2 | SYSTOLIC BLOOD PRESSURE: 116 MMHG | RESPIRATION RATE: 16 BRPM

## 2019-05-28 PROCEDURE — 99214 OFFICE O/P EST MOD 30 MIN: CPT

## 2019-05-28 RX ORDER — PHENAZOPYRIDINE HYDROCHLORIDE 200 MG/1
200 TABLET ORAL 3 TIMES DAILY
Qty: 6 | Refills: 0 | Status: DISCONTINUED | COMMUNITY
Start: 2017-10-26 | End: 2019-05-28

## 2019-05-28 RX ORDER — AMLODIPINE BESYLATE 2.5 MG/1
2.5 TABLET ORAL
Qty: 30 | Refills: 0 | Status: DISCONTINUED | COMMUNITY
Start: 2016-09-15 | End: 2019-05-28

## 2019-05-28 RX ORDER — PHENAZOPYRIDINE HYDROCHLORIDE 95 MG/1
95 TABLET ORAL 3 TIMES DAILY
Refills: 0 | Status: DISCONTINUED | COMMUNITY
Start: 2017-10-30 | End: 2019-05-28

## 2019-05-28 RX ORDER — VALACYCLOVIR 1 G/1
1 TABLET, FILM COATED ORAL DAILY
Qty: 30 | Refills: 5 | Status: DISCONTINUED | COMMUNITY
Start: 2018-07-27 | End: 2019-05-28

## 2019-05-28 RX ORDER — TINIDAZOLE 500 MG/1
500 TABLET, FILM COATED ORAL DAILY
Qty: 8 | Refills: 0 | Status: DISCONTINUED | COMMUNITY
Start: 2017-12-21 | End: 2019-05-28

## 2019-05-28 RX ORDER — TRIFLURIDINE 10 MG/ML
1 SOLUTION OPHTHALMIC TWICE DAILY
Qty: 1 | Refills: 5 | Status: DISCONTINUED | COMMUNITY
Start: 2018-07-27 | End: 2019-05-28

## 2019-05-28 RX ORDER — VALACYCLOVIR 1 G/1
1 TABLET, FILM COATED ORAL
Qty: 40 | Refills: 5 | Status: DISCONTINUED | COMMUNITY
Start: 2019-04-16 | End: 2019-05-28

## 2019-05-28 RX ORDER — PREDNISONE 10 MG/1
10 TABLET ORAL
Qty: 120 | Refills: 0 | Status: DISCONTINUED | COMMUNITY
Start: 2016-07-27 | End: 2019-05-28

## 2019-05-28 RX ORDER — TRIFLURIDINE 10 MG/ML
1 SOLUTION OPHTHALMIC 3 TIMES DAILY
Qty: 1 | Refills: 0 | Status: DISCONTINUED | COMMUNITY
Start: 2019-04-16 | End: 2019-05-28

## 2019-05-28 RX ORDER — ALPRAZOLAM 0.5 MG/1
0.5 TABLET ORAL
Qty: 2 | Refills: 0 | Status: DISCONTINUED | COMMUNITY
Start: 2017-06-15 | End: 2019-05-28

## 2019-05-29 LAB
ALBUMIN SERPL ELPH-MCNC: 3.9 G/DL
ALP BLD-CCNC: 63 U/L
ALT SERPL-CCNC: 9 U/L
ANION GAP SERPL CALC-SCNC: 10 MMOL/L
APPEARANCE: CLEAR
AST SERPL-CCNC: 14 U/L
BACTERIA: ABNORMAL
BASOPHILS # BLD AUTO: 0.03 K/UL
BASOPHILS NFR BLD AUTO: 0.4 %
BILIRUB SERPL-MCNC: 0.3 MG/DL
BILIRUBIN URINE: NEGATIVE
BLOOD URINE: NEGATIVE
BUN SERPL-MCNC: 13 MG/DL
C3 SERPL-MCNC: 104 MG/DL
C4 SERPL-MCNC: 14 MG/DL
CALCIUM SERPL-MCNC: 9 MG/DL
CHLORIDE SERPL-SCNC: 103 MMOL/L
CK SERPL-CCNC: 79 U/L
CO2 SERPL-SCNC: 23 MMOL/L
COLOR: NORMAL
CREAT SERPL-MCNC: 0.64 MG/DL
CREAT SPEC-SCNC: 126 MG/DL
CREAT/PROT UR: 0.2 RATIO
DSDNA AB SER-ACNC: 111 IU/ML
EOSINOPHIL # BLD AUTO: 0.03 K/UL
EOSINOPHIL NFR BLD AUTO: 0.4 %
GLUCOSE QUALITATIVE U: NEGATIVE
GLUCOSE SERPL-MCNC: 112 MG/DL
HCT VFR BLD CALC: 34.5 %
HGB BLD-MCNC: 11.6 G/DL
HYALINE CASTS: 0 /LPF
IMM GRANULOCYTES NFR BLD AUTO: 0.1 %
KETONES URINE: NEGATIVE
LEUKOCYTE ESTERASE URINE: NEGATIVE
LYMPHOCYTES # BLD AUTO: 3.25 K/UL
LYMPHOCYTES NFR BLD AUTO: 42.2 %
MAN DIFF?: NORMAL
MCHC RBC-ENTMCNC: 30.1 PG
MCHC RBC-ENTMCNC: 33.6 GM/DL
MCV RBC AUTO: 89.4 FL
MICROSCOPIC-UA: NORMAL
MONOCYTES # BLD AUTO: 0.55 K/UL
MONOCYTES NFR BLD AUTO: 7.1 %
NEUTROPHILS # BLD AUTO: 3.83 K/UL
NEUTROPHILS NFR BLD AUTO: 49.8 %
NITRITE URINE: NEGATIVE
PH URINE: 6.5
PLATELET # BLD AUTO: 212 K/UL
POTASSIUM SERPL-SCNC: 4.3 MMOL/L
PROT SERPL-MCNC: 6.2 G/DL
PROT UR-MCNC: 28 MG/DL
PROTEIN URINE: ABNORMAL
RBC # BLD: 3.86 M/UL
RBC # FLD: 14.1 %
RED BLOOD CELLS URINE: 0 /HPF
SODIUM SERPL-SCNC: 136 MMOL/L
SPECIFIC GRAVITY URINE: 1.02
SQUAMOUS EPITHELIAL CELLS: 10 /HPF
UROBILINOGEN URINE: NORMAL
WBC # FLD AUTO: 7.7 K/UL
WHITE BLOOD CELLS URINE: 1 /HPF

## 2019-06-10 ENCOUNTER — MEDICATION RENEWAL (OUTPATIENT)
Age: 44
End: 2019-06-10

## 2019-07-12 ENCOUNTER — APPOINTMENT (OUTPATIENT)
Dept: OPHTHALMOLOGY | Facility: CLINIC | Age: 44
End: 2019-07-12

## 2019-08-01 ENCOUNTER — APPOINTMENT (OUTPATIENT)
Dept: MAMMOGRAPHY | Facility: IMAGING CENTER | Age: 44
End: 2019-08-01
Payer: COMMERCIAL

## 2019-08-01 ENCOUNTER — OUTPATIENT (OUTPATIENT)
Dept: OUTPATIENT SERVICES | Facility: HOSPITAL | Age: 44
LOS: 1 days | End: 2019-08-01
Payer: COMMERCIAL

## 2019-08-01 ENCOUNTER — APPOINTMENT (OUTPATIENT)
Dept: ULTRASOUND IMAGING | Facility: IMAGING CENTER | Age: 44
End: 2019-08-01
Payer: COMMERCIAL

## 2019-08-01 DIAGNOSIS — Z00.00 ENCOUNTER FOR GENERAL ADULT MEDICAL EXAMINATION WITHOUT ABNORMAL FINDINGS: ICD-10-CM

## 2019-08-01 DIAGNOSIS — R92.2 INCONCLUSIVE MAMMOGRAM: ICD-10-CM

## 2019-08-01 PROCEDURE — 77063 BREAST TOMOSYNTHESIS BI: CPT | Mod: 26

## 2019-08-01 PROCEDURE — 77067 SCR MAMMO BI INCL CAD: CPT

## 2019-08-01 PROCEDURE — 77067 SCR MAMMO BI INCL CAD: CPT | Mod: 26

## 2019-08-01 PROCEDURE — 77063 BREAST TOMOSYNTHESIS BI: CPT

## 2019-08-20 ENCOUNTER — OUTPATIENT (OUTPATIENT)
Dept: OUTPATIENT SERVICES | Facility: HOSPITAL | Age: 44
LOS: 1 days | End: 2019-08-20
Payer: COMMERCIAL

## 2019-08-20 ENCOUNTER — APPOINTMENT (OUTPATIENT)
Dept: ULTRASOUND IMAGING | Facility: IMAGING CENTER | Age: 44
End: 2019-08-20
Payer: COMMERCIAL

## 2019-08-20 DIAGNOSIS — E04.2 NONTOXIC MULTINODULAR GOITER: ICD-10-CM

## 2019-08-20 PROCEDURE — 76536 US EXAM OF HEAD AND NECK: CPT

## 2019-08-20 PROCEDURE — 76536 US EXAM OF HEAD AND NECK: CPT | Mod: 26

## 2019-08-21 ENCOUNTER — APPOINTMENT (OUTPATIENT)
Dept: OPHTHALMOLOGY | Facility: CLINIC | Age: 44
End: 2019-08-21
Payer: COMMERCIAL

## 2019-08-21 ENCOUNTER — NON-APPOINTMENT (OUTPATIENT)
Age: 44
End: 2019-08-21

## 2019-08-21 PROCEDURE — 92012 INTRM OPH EXAM EST PATIENT: CPT

## 2019-09-23 ENCOUNTER — RESULT REVIEW (OUTPATIENT)
Age: 44
End: 2019-09-23

## 2019-10-01 ENCOUNTER — APPOINTMENT (OUTPATIENT)
Dept: RHEUMATOLOGY | Facility: CLINIC | Age: 44
End: 2019-10-01
Payer: COMMERCIAL

## 2019-10-01 VITALS
SYSTOLIC BLOOD PRESSURE: 123 MMHG | TEMPERATURE: 98.4 F | DIASTOLIC BLOOD PRESSURE: 82 MMHG | BODY MASS INDEX: 28 KG/M2 | HEART RATE: 93 BPM | OXYGEN SATURATION: 98 % | HEIGHT: 63 IN | WEIGHT: 158 LBS

## 2019-10-01 PROCEDURE — 99214 OFFICE O/P EST MOD 30 MIN: CPT | Mod: 25

## 2019-10-01 PROCEDURE — G0009: CPT

## 2019-10-01 PROCEDURE — 90732 PPSV23 VACC 2 YRS+ SUBQ/IM: CPT

## 2019-10-02 ENCOUNTER — TRANSCRIPTION ENCOUNTER (OUTPATIENT)
Age: 44
End: 2019-10-02

## 2019-10-02 LAB
25(OH)D3 SERPL-MCNC: 43.3 NG/ML
ALBUMIN SERPL ELPH-MCNC: 4.6 G/DL
ALP BLD-CCNC: 72 U/L
ALT SERPL-CCNC: 8 U/L
ANION GAP SERPL CALC-SCNC: 14 MMOL/L
APPEARANCE: CLEAR
AST SERPL-CCNC: 20 U/L
BACTERIA: ABNORMAL
BASOPHILS # BLD AUTO: 0.03 K/UL
BASOPHILS NFR BLD AUTO: 0.4 %
BILIRUB SERPL-MCNC: 0.5 MG/DL
BILIRUBIN URINE: NEGATIVE
BLOOD URINE: NEGATIVE
BUN SERPL-MCNC: 8 MG/DL
C3 SERPL-MCNC: 130 MG/DL
C4 SERPL-MCNC: 19 MG/DL
CALCIUM SERPL-MCNC: 9.3 MG/DL
CHLORIDE SERPL-SCNC: 100 MMOL/L
CK SERPL-CCNC: 68 U/L
CO2 SERPL-SCNC: 23 MMOL/L
COLOR: NORMAL
CREAT SERPL-MCNC: 0.65 MG/DL
CREAT SPEC-SCNC: 73 MG/DL
CREAT/PROT UR: 0.5 RATIO
EOSINOPHIL # BLD AUTO: 0.03 K/UL
EOSINOPHIL NFR BLD AUTO: 0.4 %
GLUCOSE QUALITATIVE U: NEGATIVE
GLUCOSE SERPL-MCNC: 91 MG/DL
HCT VFR BLD CALC: 37.6 %
HGB BLD-MCNC: 12.6 G/DL
HYALINE CASTS: 0 /LPF
IMM GRANULOCYTES NFR BLD AUTO: 0.4 %
KETONES URINE: NEGATIVE
LEUKOCYTE ESTERASE URINE: NEGATIVE
LYMPHOCYTES # BLD AUTO: 2.46 K/UL
LYMPHOCYTES NFR BLD AUTO: 33.2 %
MAN DIFF?: NORMAL
MCHC RBC-ENTMCNC: 29.1 PG
MCHC RBC-ENTMCNC: 33.5 GM/DL
MCV RBC AUTO: 86.8 FL
MICROSCOPIC-UA: NORMAL
MONOCYTES # BLD AUTO: 0.67 K/UL
MONOCYTES NFR BLD AUTO: 9 %
NEUTROPHILS # BLD AUTO: 4.19 K/UL
NEUTROPHILS NFR BLD AUTO: 56.6 %
NITRITE URINE: NEGATIVE
PH URINE: 6.5
PLATELET # BLD AUTO: 233 K/UL
POTASSIUM SERPL-SCNC: 4 MMOL/L
PROT SERPL-MCNC: 7.4 G/DL
PROT UR-MCNC: 34 MG/DL
PROTEIN URINE: ABNORMAL
RBC # BLD: 4.33 M/UL
RBC # FLD: 13 %
RED BLOOD CELLS URINE: 1 /HPF
SODIUM SERPL-SCNC: 137 MMOL/L
SPECIFIC GRAVITY URINE: 1.02
SQUAMOUS EPITHELIAL CELLS: 23 /HPF
UROBILINOGEN URINE: NORMAL
WBC # FLD AUTO: 7.41 K/UL
WHITE BLOOD CELLS URINE: 6 /HPF

## 2019-10-03 LAB
DSDNA AB SER-ACNC: 207 IU/ML
ENA RNP AB SER IA-ACNC: 1.3 AL
ENA SM AB SER IA-ACNC: 2.8 AL

## 2019-10-08 LAB
MISCELLANEOUS TEST: NORMAL
PROC NAME: NORMAL

## 2019-11-25 ENCOUNTER — RX RENEWAL (OUTPATIENT)
Age: 44
End: 2019-11-25

## 2019-12-18 ENCOUNTER — APPOINTMENT (OUTPATIENT)
Dept: OPHTHALMOLOGY | Facility: CLINIC | Age: 44
End: 2019-12-18

## 2020-02-11 ENCOUNTER — APPOINTMENT (OUTPATIENT)
Dept: RHEUMATOLOGY | Facility: CLINIC | Age: 45
End: 2020-02-11
Payer: COMMERCIAL

## 2020-02-11 VITALS
DIASTOLIC BLOOD PRESSURE: 78 MMHG | WEIGHT: 151 LBS | RESPIRATION RATE: 16 BRPM | TEMPERATURE: 98.1 F | HEART RATE: 88 BPM | SYSTOLIC BLOOD PRESSURE: 121 MMHG | BODY MASS INDEX: 26.75 KG/M2 | HEIGHT: 63 IN | OXYGEN SATURATION: 99 %

## 2020-02-11 DIAGNOSIS — R61 GENERALIZED HYPERHIDROSIS: ICD-10-CM

## 2020-02-11 LAB
25(OH)D3 SERPL-MCNC: 23.2 NG/ML
ALBUMIN SERPL ELPH-MCNC: 4.3 G/DL
ALP BLD-CCNC: 89 U/L
ALT SERPL-CCNC: 9 U/L
ANION GAP SERPL CALC-SCNC: 13 MMOL/L
APPEARANCE: CLEAR
AST SERPL-CCNC: 17 U/L
BACTERIA: ABNORMAL
BASOPHILS # BLD AUTO: 0.02 K/UL
BASOPHILS NFR BLD AUTO: 0.3 %
BILIRUB SERPL-MCNC: 0.2 MG/DL
BILIRUBIN URINE: NEGATIVE
BLOOD URINE: NEGATIVE
BUN SERPL-MCNC: 16 MG/DL
C3 SERPL-MCNC: 113 MG/DL
C4 SERPL-MCNC: 18 MG/DL
CALCIUM SERPL-MCNC: 9.5 MG/DL
CHLORIDE SERPL-SCNC: 104 MMOL/L
CK SERPL-CCNC: 62 U/L
CO2 SERPL-SCNC: 24 MMOL/L
COLOR: YELLOW
CREAT SERPL-MCNC: 0.67 MG/DL
EOSINOPHIL # BLD AUTO: 0.04 K/UL
EOSINOPHIL NFR BLD AUTO: 0.7 %
FSH SERPL-MCNC: 37.6 IU/L
GLUCOSE QUALITATIVE U: NEGATIVE
GLUCOSE SERPL-MCNC: 93 MG/DL
HCT VFR BLD CALC: 37.3 %
HGB BLD-MCNC: 12.6 G/DL
HYALINE CASTS: 1 /LPF
IMM GRANULOCYTES NFR BLD AUTO: 0.3 %
KETONES URINE: NEGATIVE
LEUKOCYTE ESTERASE URINE: NEGATIVE
LH SERPL-ACNC: 20.3 IU/L
LYMPHOCYTES # BLD AUTO: 2.52 K/UL
LYMPHOCYTES NFR BLD AUTO: 41.2 %
MAN DIFF?: NORMAL
MCHC RBC-ENTMCNC: 28.9 PG
MCHC RBC-ENTMCNC: 33.8 GM/DL
MCV RBC AUTO: 85.6 FL
MICROSCOPIC-UA: NORMAL
MONOCYTES # BLD AUTO: 0.77 K/UL
MONOCYTES NFR BLD AUTO: 12.6 %
NEUTROPHILS # BLD AUTO: 2.75 K/UL
NEUTROPHILS NFR BLD AUTO: 44.9 %
NITRITE URINE: NEGATIVE
PH URINE: 6
PLATELET # BLD AUTO: 212 K/UL
POTASSIUM SERPL-SCNC: 3.7 MMOL/L
PROT SERPL-MCNC: 7.3 G/DL
PROTEIN URINE: ABNORMAL
RBC # BLD: 4.36 M/UL
RBC # FLD: 12.5 %
RED BLOOD CELLS URINE: 1 /HPF
SODIUM SERPL-SCNC: 142 MMOL/L
SPECIFIC GRAVITY URINE: 1.03
SQUAMOUS EPITHELIAL CELLS: 11 /HPF
UROBILINOGEN URINE: NORMAL
WBC # FLD AUTO: 6.12 K/UL
WHITE BLOOD CELLS URINE: 2 /HPF

## 2020-02-11 PROCEDURE — 99214 OFFICE O/P EST MOD 30 MIN: CPT

## 2020-02-12 PROBLEM — R61 NIGHT SWEATS: Status: ACTIVE | Noted: 2020-02-12

## 2020-02-12 LAB
CREAT SPEC-SCNC: 204 MG/DL
CREAT/PROT UR: 0.3 RATIO
DSDNA AB SER-ACNC: 195 IU/ML
PROT UR-MCNC: 66 MG/DL

## 2020-02-27 LAB
MISCELLANEOUS TEST: NORMAL
PROC NAME: NORMAL

## 2020-02-29 ENCOUNTER — TRANSCRIPTION ENCOUNTER (OUTPATIENT)
Age: 45
End: 2020-02-29

## 2020-04-12 ENCOUNTER — TRANSCRIPTION ENCOUNTER (OUTPATIENT)
Age: 45
End: 2020-04-12

## 2020-04-16 ENCOUNTER — APPOINTMENT (OUTPATIENT)
Dept: RHEUMATOLOGY | Facility: CLINIC | Age: 45
End: 2020-04-16
Payer: COMMERCIAL

## 2020-04-16 ENCOUNTER — TRANSCRIPTION ENCOUNTER (OUTPATIENT)
Age: 45
End: 2020-04-16

## 2020-04-16 VITALS — DIASTOLIC BLOOD PRESSURE: 80 MMHG | SYSTOLIC BLOOD PRESSURE: 124 MMHG | HEART RATE: 109 BPM | RESPIRATION RATE: 15 BRPM

## 2020-04-16 LAB
25(OH)D3 SERPL-MCNC: 28.4 NG/ML
ALBUMIN SERPL ELPH-MCNC: 4.6 G/DL
ALP BLD-CCNC: 92 U/L
ALT SERPL-CCNC: 10 U/L
ANION GAP SERPL CALC-SCNC: 20 MMOL/L
APPEARANCE: ABNORMAL
AST SERPL-CCNC: 21 U/L
BACTERIA: NEGATIVE
BASOPHILS # BLD AUTO: 0.02 K/UL
BASOPHILS NFR BLD AUTO: 0.2 %
BILIRUB SERPL-MCNC: 0.6 MG/DL
BILIRUBIN URINE: NEGATIVE
BLOOD URINE: NEGATIVE
BUN SERPL-MCNC: 8 MG/DL
CALCIUM SERPL-MCNC: 9.9 MG/DL
CHLORIDE SERPL-SCNC: 97 MMOL/L
CHOLEST SERPL-MCNC: 137 MG/DL
CHOLEST/HDLC SERPL: 1.9 RATIO
CK SERPL-CCNC: 51 U/L
CO2 SERPL-SCNC: 22 MMOL/L
COLOR: NORMAL
CREAT SERPL-MCNC: 0.73 MG/DL
EOSINOPHIL # BLD AUTO: 0.02 K/UL
EOSINOPHIL NFR BLD AUTO: 0.2 %
GLUCOSE QUALITATIVE U: NEGATIVE
GLUCOSE SERPL-MCNC: 61 MG/DL
HCT VFR BLD CALC: 39.7 %
HDLC SERPL-MCNC: 72 MG/DL
HGB BLD-MCNC: 13.2 G/DL
HYALINE CASTS: 1 /LPF
IMM GRANULOCYTES NFR BLD AUTO: 0.2 %
KETONES URINE: NORMAL
LDLC SERPL CALC-MCNC: 55 MG/DL
LEUKOCYTE ESTERASE URINE: NEGATIVE
LYMPHOCYTES # BLD AUTO: 2.03 K/UL
LYMPHOCYTES NFR BLD AUTO: 21.9 %
MAN DIFF?: NORMAL
MCHC RBC-ENTMCNC: 29.1 PG
MCHC RBC-ENTMCNC: 33.2 GM/DL
MCV RBC AUTO: 87.6 FL
MICROSCOPIC-UA: NORMAL
MONOCYTES # BLD AUTO: 0.83 K/UL
MONOCYTES NFR BLD AUTO: 9 %
NEUTROPHILS # BLD AUTO: 6.33 K/UL
NEUTROPHILS NFR BLD AUTO: 68.5 %
NITRITE URINE: NEGATIVE
PH URINE: 6.5
PLATELET # BLD AUTO: 213 K/UL
POTASSIUM SERPL-SCNC: 4.6 MMOL/L
PROT SERPL-MCNC: 7.6 G/DL
PROTEIN URINE: ABNORMAL
RBC # BLD: 4.53 M/UL
RBC # FLD: 12.6 %
RED BLOOD CELLS URINE: 0 /HPF
SODIUM SERPL-SCNC: 138 MMOL/L
SPECIFIC GRAVITY URINE: 1.01
SQUAMOUS EPITHELIAL CELLS: 13 /HPF
TRIGL SERPL-MCNC: 52 MG/DL
UROBILINOGEN URINE: NORMAL
WBC # FLD AUTO: 9.25 K/UL
WHITE BLOOD CELLS URINE: 2 /HPF

## 2020-04-16 PROCEDURE — 36415 COLL VENOUS BLD VENIPUNCTURE: CPT

## 2020-04-16 PROCEDURE — 99214 OFFICE O/P EST MOD 30 MIN: CPT | Mod: 25

## 2020-04-16 NOTE — HISTORY OF PRESENT ILLNESS
[FreeTextEntry1] : 1.  SLE: Onset in 2012 with fatigue, fever, arthritis, and nephritis.  A kidney biopsy was performed around 2013 (results unknown).  She was treated with MMF 3 g/d and did well until 2016 at which time she had a  flare (extra-renal: malar rash, SLE in the scalp, Raynauds, alopecia).  Plaquenil and steroids were added.  Since that time she has done quite well.  She has not had cardiopulmonary involvement, oral ulcers, thrombosis. In Apr 2020 she developed a heavy chest, which improved spontaneously.  She also had pain in her fingertips, although no ischemic lesions were noted.  These also improved on their own.\par 2.  OB history:  uncomplicated pregnancy at age 16\par \par Meds\par Probiotic\par Plaquenil 400 mg 5/7 and 200 mg 2/7 days\par CellCept 1.0 gm/d\par Vitamin D 2000 IU/d\par \par Vaccine\par Prevnar 13 10/16/18 ([id number]; exp 2/20)\par PNVX 23 10/1/19 ([id number]; exp 3/25/21)

## 2020-04-16 NOTE — ASSESSMENT
[FreeTextEntry1] : 1.  SLE: Onset in 2012 with fatigue, fever, arthritis, and nephritis.  A kidney biopsy was performed around 2013 (results unknown).  She was treated with MMF 3 g/d and did well until 2016 at which time she had a  flare (extra-renal: malar rash, SLE in the scalp, Raynauds, alopecia).  Plaquenil and steroids were added.  Since that time she has done quite well.  She has not had cardiopulmonary involvement, oral ulcers, thrombosis. In Apr 2020 she developed a heavy chest, which improved spontaneously.  She also had pain in her fingertips, although no ischemic lesions were noted.  These also improved on their own.\par 2.  OB history:  uncomplicated pregnancy at age 16\par \par Plan:\par 1.  Lab tests\par 2.  Contact me one week\par 3.  Return 3 months\par \par

## 2020-04-17 LAB
C3 SERPL-MCNC: 118 MG/DL
C4 SERPL-MCNC: 18 MG/DL
CREAT SPEC-SCNC: 69 MG/DL
CREAT/PROT UR: 0.1 RATIO
DSDNA AB SER-ACNC: 199 IU/ML
PROT UR-MCNC: 10 MG/DL

## 2020-04-25 ENCOUNTER — MESSAGE (OUTPATIENT)
Age: 45
End: 2020-04-25

## 2020-06-05 ENCOUNTER — RX RENEWAL (OUTPATIENT)
Age: 45
End: 2020-06-05

## 2020-06-10 ENCOUNTER — APPOINTMENT (OUTPATIENT)
Dept: OBGYN | Facility: CLINIC | Age: 45
End: 2020-06-10
Payer: COMMERCIAL

## 2020-06-10 VITALS
DIASTOLIC BLOOD PRESSURE: 70 MMHG | SYSTOLIC BLOOD PRESSURE: 122 MMHG | BODY MASS INDEX: 26.22 KG/M2 | HEIGHT: 63 IN | WEIGHT: 148 LBS

## 2020-06-10 DIAGNOSIS — Z01.419 ENCOUNTER FOR GYNECOLOGICAL EXAMINATION (GENERAL) (ROUTINE) W/OUT ABNORMAL FINDINGS: ICD-10-CM

## 2020-06-10 PROCEDURE — 99396 PREV VISIT EST AGE 40-64: CPT

## 2020-06-10 NOTE — PHYSICAL EXAM
[Awake] : awake [Alert] : alert [Acute Distress] : no acute distress [LAD] : no lymphadenopathy [Thyroid Nodule] : no thyroid nodule [Goiter] : no goiter [Mass] : no breast mass [Nipple Discharge] : no nipple discharge [Axillary LAD] : no axillary lymphadenopathy [Soft] : soft [Tender] : non tender [Oriented x3] : oriented to person, place, and time [Normal] : vagina [No Bleeding] : there was no active vaginal bleeding [Absent] : absent [Adnexa Absent] : was absent on the right [Uterine Adnexae] : was normal on the left [RRR, No Murmurs] : RRR, no murmurs [CTAB] : CTAB

## 2020-06-13 ENCOUNTER — NON-APPOINTMENT (OUTPATIENT)
Age: 45
End: 2020-06-13

## 2020-06-15 LAB — CYTOLOGY CVX/VAG DOC THIN PREP: ABNORMAL

## 2020-08-14 ENCOUNTER — RESULT REVIEW (OUTPATIENT)
Age: 45
End: 2020-08-14

## 2020-08-14 ENCOUNTER — APPOINTMENT (OUTPATIENT)
Dept: MAMMOGRAPHY | Facility: IMAGING CENTER | Age: 45
End: 2020-08-14
Payer: COMMERCIAL

## 2020-08-14 ENCOUNTER — OUTPATIENT (OUTPATIENT)
Dept: OUTPATIENT SERVICES | Facility: HOSPITAL | Age: 45
LOS: 1 days | End: 2020-08-14
Payer: COMMERCIAL

## 2020-08-14 ENCOUNTER — APPOINTMENT (OUTPATIENT)
Dept: ULTRASOUND IMAGING | Facility: IMAGING CENTER | Age: 45
End: 2020-08-14
Payer: COMMERCIAL

## 2020-08-14 DIAGNOSIS — Z00.8 ENCOUNTER FOR OTHER GENERAL EXAMINATION: ICD-10-CM

## 2020-08-14 DIAGNOSIS — Z01.419 ENCOUNTER FOR GYNECOLOGICAL EXAMINATION (GENERAL) (ROUTINE) WITHOUT ABNORMAL FINDINGS: ICD-10-CM

## 2020-08-14 PROCEDURE — 76536 US EXAM OF HEAD AND NECK: CPT | Mod: 26

## 2020-08-14 PROCEDURE — 77067 SCR MAMMO BI INCL CAD: CPT

## 2020-08-14 PROCEDURE — 77067 SCR MAMMO BI INCL CAD: CPT | Mod: 26

## 2020-08-14 PROCEDURE — 76641 ULTRASOUND BREAST COMPLETE: CPT

## 2020-08-14 PROCEDURE — 77063 BREAST TOMOSYNTHESIS BI: CPT | Mod: 26

## 2020-08-14 PROCEDURE — 76641 ULTRASOUND BREAST COMPLETE: CPT | Mod: 26,50

## 2020-08-14 PROCEDURE — 77063 BREAST TOMOSYNTHESIS BI: CPT

## 2020-08-14 PROCEDURE — 76536 US EXAM OF HEAD AND NECK: CPT

## 2020-08-25 ENCOUNTER — APPOINTMENT (OUTPATIENT)
Dept: RHEUMATOLOGY | Facility: CLINIC | Age: 45
End: 2020-08-25
Payer: COMMERCIAL

## 2020-08-25 VITALS
TEMPERATURE: 97.2 F | DIASTOLIC BLOOD PRESSURE: 83 MMHG | HEART RATE: 81 BPM | WEIGHT: 153 LBS | OXYGEN SATURATION: 96 % | RESPIRATION RATE: 14 BRPM | HEIGHT: 63 IN | BODY MASS INDEX: 27.11 KG/M2 | SYSTOLIC BLOOD PRESSURE: 121 MMHG

## 2020-08-25 DIAGNOSIS — Z20.828 CONTACT WITH AND (SUSPECTED) EXPOSURE TO OTHER VIRAL COMMUNICABLE DISEASES: ICD-10-CM

## 2020-08-25 PROCEDURE — 99214 OFFICE O/P EST MOD 30 MIN: CPT

## 2020-08-26 LAB
ALBUMIN SERPL ELPH-MCNC: 4.4 G/DL
ALP BLD-CCNC: 88 U/L
ALT SERPL-CCNC: 9 U/L
ANION GAP SERPL CALC-SCNC: 16 MMOL/L
APPEARANCE: CLEAR
APTT BLD: 31.9 SEC
AST SERPL-CCNC: 19 U/L
BACTERIA: NEGATIVE
BASOPHILS # BLD AUTO: 0.03 K/UL
BASOPHILS NFR BLD AUTO: 0.4 %
BILIRUB SERPL-MCNC: 0.2 MG/DL
BILIRUBIN URINE: NEGATIVE
BLOOD URINE: NEGATIVE
BUN SERPL-MCNC: 14 MG/DL
C3 SERPL-MCNC: 111 MG/DL
C4 SERPL-MCNC: 15 MG/DL
CALCIUM SERPL-MCNC: 9.3 MG/DL
CHLORIDE SERPL-SCNC: 104 MMOL/L
CK SERPL-CCNC: 59 U/L
CO2 SERPL-SCNC: 19 MMOL/L
COLOR: NORMAL
CONFIRM: 29.4 SEC
CREAT SERPL-MCNC: 0.69 MG/DL
CREAT SPEC-SCNC: 58 MG/DL
CREAT/PROT UR: 0.2 RATIO
DRVVT IMM 1:2 NP PPP: NORMAL
DRVVT SCREEN TO CONFIRM RATIO: 0.97 RATIO
DSDNA AB SER-ACNC: 202 IU/ML
ENA SS-A AB SER IA-ACNC: <0.2 AL
ENA SS-B AB SER IA-ACNC: <0.2 AL
EOSINOPHIL # BLD AUTO: 0.05 K/UL
EOSINOPHIL NFR BLD AUTO: 0.7 %
GLUCOSE QUALITATIVE U: NEGATIVE
GLUCOSE SERPL-MCNC: 78 MG/DL
HCT VFR BLD CALC: 41 %
HGB BLD-MCNC: 13.1 G/DL
HYALINE CASTS: 0 /LPF
IMM GRANULOCYTES NFR BLD AUTO: 0.1 %
INR PPP: 0.93 RATIO
KETONES URINE: NEGATIVE
LEUKOCYTE ESTERASE URINE: NEGATIVE
LYMPHOCYTES # BLD AUTO: 2.47 K/UL
LYMPHOCYTES NFR BLD AUTO: 33.7 %
MAN DIFF?: NORMAL
MCHC RBC-ENTMCNC: 28.9 PG
MCHC RBC-ENTMCNC: 32 GM/DL
MCV RBC AUTO: 90.5 FL
MICROSCOPIC-UA: NORMAL
MONOCYTES # BLD AUTO: 0.66 K/UL
MONOCYTES NFR BLD AUTO: 9 %
NEUTROPHILS # BLD AUTO: 4.11 K/UL
NEUTROPHILS NFR BLD AUTO: 56.1 %
NITRITE URINE: NEGATIVE
PH URINE: 6.5
PLATELET # BLD AUTO: 215 K/UL
POTASSIUM SERPL-SCNC: 4 MMOL/L
PROT SERPL-MCNC: 7.4 G/DL
PROT UR-MCNC: 11 MG/DL
PROTEIN URINE: NORMAL
PT BLD: 11 SEC
RBC # BLD: 4.53 M/UL
RBC # FLD: 12.7 %
RED BLOOD CELLS URINE: 0 /HPF
SARS-COV-2 IGG SERPL IA-ACNC: 0.01 INDEX
SARS-COV-2 IGG SERPL QL IA: NEGATIVE
SCREEN DRVVT: 31.7 SEC
SILICA CLOTTING TIME INTERPRETATION: NORMAL
SILICA CLOTTING TIME S/C: 0.91 RATIO
SODIUM SERPL-SCNC: 139 MMOL/L
SPECIFIC GRAVITY URINE: 1.01
SQUAMOUS EPITHELIAL CELLS: 3 /HPF
UROBILINOGEN URINE: NORMAL
WBC # FLD AUTO: 7.33 K/UL
WHITE BLOOD CELLS URINE: 1 /HPF

## 2020-08-27 LAB
B2 GLYCOPROT1 IGA SERPL IA-ACNC: 52.1 SAU
B2 GLYCOPROT1 IGG SER-ACNC: <5 SGU
B2 GLYCOPROT1 IGM SER-ACNC: <5 SMU
CARDIOLIPIN IGM SER-MCNC: 5 GPL
CARDIOLIPIN IGM SER-MCNC: 6.6 MPL

## 2020-08-28 LAB — DEPRECATED CARDIOLIPIN IGA SER: <5 APL

## 2020-09-01 LAB
MISCELLANEOUS TEST: NORMAL
PROC NAME: NORMAL

## 2020-09-08 ENCOUNTER — TRANSCRIPTION ENCOUNTER (OUTPATIENT)
Age: 45
End: 2020-09-08

## 2020-09-10 ENCOUNTER — APPOINTMENT (OUTPATIENT)
Dept: NEUROLOGY | Facility: CLINIC | Age: 45
End: 2020-09-10
Payer: COMMERCIAL

## 2020-09-10 VITALS — TEMPERATURE: 96.8 F

## 2020-09-10 VITALS
BODY MASS INDEX: 26.22 KG/M2 | HEIGHT: 63 IN | DIASTOLIC BLOOD PRESSURE: 71 MMHG | SYSTOLIC BLOOD PRESSURE: 110 MMHG | HEART RATE: 89 BPM | WEIGHT: 148 LBS

## 2020-09-10 PROCEDURE — 99204 OFFICE O/P NEW MOD 45 MIN: CPT

## 2020-09-10 NOTE — HISTORY OF PRESENT ILLNESS
[FreeTextEntry1] : The patient is a 45-year-old, right-handed, nonhypertensive, nondiabetic woman with lupus erythematosus, referred for neurological consultation for headaches by Dr. Manohar Faustin.  She gives a history of having had "migraine" as a very young woman. She had been headache free for many years. She was diagnosed with lupus in about 2013. She was headache free at the time. A 3 weeks ago, she awoke with a headache that was approximately 7/10 in intensity. It was pressure like and migratory. It could be over both eyes, temples, or occiput. He was pressure like in nature. There was some associated nausea, but no vomiting, photophobia or phonophobia. The headaches last only for about an hour. There also accompanied by some lightheadedness. There has been no focal weakness. She had some relief from minor analgesics.

## 2020-09-10 NOTE — ASSESSMENT
[FreeTextEntry1] : The patient is a 45-year-old, right-handed, nonhypertensive, nondiabetic woman with lupus erythematosus, referred for neurological consultation for headaches by Dr. Manohar Faustin.  She gives a history of having had "migraine" as a very young woman. She had been headache free for many years. She was diagnosed with lupus in about 2013. She was headache free at the time. A 3 weeks ago, she awoke with a headache that was approximately 7/10 in intensity. It was pressure like and migratory. It could be over both eyes, temples, or occiput. He was pressure like in nature. There was some associated nausea, but no vomiting, photophobia or phonophobia. The headaches last only for about an hour. There also accompanied by some lightheadedness. There has been no focal weakness. She had some relief from minor analgesics.\par The general physical examination is positive only for some tenderness in the right trapezius and for a scar across the bridge of the nose. On neurological examination, her mental status, cranial nerves and motor examination are all normal. She has absent reflexes at the ankles, but no sensory symptoms or signs. Her gait and station are normal. This is most likely a tension type headache. However, in the setting of lupus, I have to rule out inflammatory disease. I will order an MRI of the brain, with contrast. Her BUN and creatinine are normal, so that should not be a problem. I will speak to her after these are completed.  I have also suggested an ergonomic evaluation of her workplace and neck stretching exercises.  I will send a copy of my note to Dr. Faustin.

## 2020-09-10 NOTE — PHYSICAL EXAM
[General Appearance - Alert] : alert [General Appearance - In No Acute Distress] : in no acute distress [Impaired Insight] : insight and judgment were intact [Oriented To Time, Place, And Person] : oriented to person, place, and time [Person] : oriented to person [Affect] : the affect was normal [Time] : oriented to time [Place] : oriented to place [Visual Intact] : visual attention was ~T not ~L decreased [Concentration Intact] : normal concentrating ability [Naming Objects] : no difficulty naming common objects [Repeating Phrases] : no difficulty repeating a phrase [Writing A Sentence] : no difficulty writing a sentence [Comprehension] : comprehension intact [Fluency] : fluency intact [Reading] : reading intact [Past History] : adequate knowledge of personal past history [Cranial Nerves Optic (II)] : visual acuity intact bilaterally,  visual fields full to confrontation, pupils equal round and reactive to light [Cranial Nerves Oculomotor (III)] : extraocular motion intact [Cranial Nerves Trigeminal (V)] : facial sensation intact symmetrically [Cranial Nerves Vestibulocochlear (VIII)] : hearing was intact bilaterally [Cranial Nerves Glossopharyngeal (IX)] : tongue and palate midline [Cranial Nerves Facial (VII)] : face symmetrical [Cranial Nerves Accessory (XI - Cranial And Spinal)] : head turning and shoulder shrug symmetric [Motor Strength] : muscle strength was normal in all four extremities [Cranial Nerves Hypoglossal (XII)] : there was no tongue deviation with protrusion [Sensation Tactile Decrease] : light touch was intact [No Muscle Atrophy] : normal bulk in all four extremities [Balance] : balance was intact [2+] : Patella right 2+ [0] : Ankle jerk left 0 [Sclera] : the sclera and conjunctiva were normal [PERRL With Normal Accommodation] : pupils were equal in size, round, reactive to light, with normal accommodation [Extraocular Movements] : extraocular movements were intact [Outer Ear] : the ears and nose were normal in appearance [Oropharynx] : the oropharynx was normal [Neck Appearance] : the appearance of the neck was normal [Neck Cervical Mass (___cm)] : no neck mass was observed [Jugular Venous Distention Increased] : there was no jugular-venous distention [] : no respiratory distress [Thyroid Nodule] : there were no palpable thyroid nodules [Thyroid Diffuse Enlargement] : the thyroid was not enlarged [Heart Sounds] : normal S1 and S2 [Heart Rate And Rhythm] : heart rate was normal and rhythm regular [Auscultation Breath Sounds / Voice Sounds] : lungs were clear to auscultation bilaterally [Heart Sounds Pericardial Friction Rub] : no pericardial rub [Heart Sounds Gallop] : no gallops [Murmurs] : no murmurs [Edema] : there was no peripheral edema [No CVA Tenderness] : no ~M costovertebral angle tenderness [Full Pulse] : the pedal pulses are present [No Spinal Tenderness] : no spinal tenderness [Motor Tone] : muscle strength and tone were normal [Nail Clubbing] : no clubbing  or cyanosis of the fingernails [Musculoskeletal - Swelling] : no joint swelling seen [Abnormal Walk] : normal gait [Skin Color & Pigmentation] : normal skin color and pigmentation [Skin Turgor] : normal skin turgor [Past-pointing] : there was no past-pointing [Tremor] : no tremor present [Plantar Reflex Right Only] : normal on the right [Plantar Reflex Left Only] : normal on the left [FreeTextEntry1] : There is a scar across the bridge of the nose from her lupus.

## 2020-09-10 NOTE — REVIEW OF SYSTEMS
[Sleep Disturbances] : sleep disturbances [As Noted in HPI] : as noted in HPI [Anxiety] : anxiety [Negative] : Endocrine

## 2020-09-17 ENCOUNTER — APPOINTMENT (OUTPATIENT)
Dept: OPHTHALMOLOGY | Facility: CLINIC | Age: 45
End: 2020-09-17
Payer: COMMERCIAL

## 2020-09-17 ENCOUNTER — NON-APPOINTMENT (OUTPATIENT)
Age: 45
End: 2020-09-17

## 2020-09-17 PROCEDURE — 92014 COMPRE OPH EXAM EST PT 1/>: CPT

## 2020-09-21 ENCOUNTER — APPOINTMENT (OUTPATIENT)
Dept: NEUROLOGY | Facility: CLINIC | Age: 45
End: 2020-09-21

## 2020-09-30 ENCOUNTER — TRANSCRIPTION ENCOUNTER (OUTPATIENT)
Age: 45
End: 2020-09-30

## 2020-10-03 ENCOUNTER — TRANSCRIPTION ENCOUNTER (OUTPATIENT)
Age: 45
End: 2020-10-03

## 2020-10-20 ENCOUNTER — APPOINTMENT (OUTPATIENT)
Dept: RHEUMATOLOGY | Facility: CLINIC | Age: 45
End: 2020-10-20
Payer: COMMERCIAL

## 2020-10-20 VITALS
OXYGEN SATURATION: 97 % | DIASTOLIC BLOOD PRESSURE: 71 MMHG | RESPIRATION RATE: 14 BRPM | BODY MASS INDEX: 25.87 KG/M2 | TEMPERATURE: 96.7 F | SYSTOLIC BLOOD PRESSURE: 112 MMHG | HEART RATE: 82 BPM | WEIGHT: 146 LBS | HEIGHT: 63 IN

## 2020-10-20 PROCEDURE — 99072 ADDL SUPL MATRL&STAF TM PHE: CPT

## 2020-10-20 PROCEDURE — 99214 OFFICE O/P EST MOD 30 MIN: CPT | Mod: 25

## 2020-10-21 LAB
25(OH)D3 SERPL-MCNC: 44.1 NG/ML
ALBUMIN SERPL ELPH-MCNC: 4.7 G/DL
ALP BLD-CCNC: 84 U/L
ALT SERPL-CCNC: 10 U/L
ANION GAP SERPL CALC-SCNC: 21 MMOL/L
APPEARANCE: CLEAR
AST SERPL-CCNC: 20 U/L
BACTERIA: NEGATIVE
BASOPHILS # BLD AUTO: 0.03 K/UL
BASOPHILS NFR BLD AUTO: 0.4 %
BILIRUB SERPL-MCNC: 0.5 MG/DL
BILIRUBIN URINE: NEGATIVE
BLOOD URINE: NEGATIVE
BUN SERPL-MCNC: 8 MG/DL
C3 SERPL-MCNC: 121 MG/DL
C4 SERPL-MCNC: 14 MG/DL
CALCIUM SERPL-MCNC: 9.5 MG/DL
CHLORIDE SERPL-SCNC: 102 MMOL/L
CK SERPL-CCNC: 60 U/L
CO2 SERPL-SCNC: 20 MMOL/L
COLOR: NORMAL
CREAT SERPL-MCNC: 0.7 MG/DL
CREAT SPEC-SCNC: 184 MG/DL
CREAT/PROT UR: 0.6 RATIO
DSDNA AB SER-ACNC: 160 IU/ML
ENA RNP AB SER IA-ACNC: 2.2 AL
ENA SM AB SER IA-ACNC: 6.6 AL
ENA SS-A AB SER IA-ACNC: <0.2 AL
ENA SS-B AB SER IA-ACNC: <0.2 AL
EOSINOPHIL # BLD AUTO: 0 K/UL
EOSINOPHIL NFR BLD AUTO: 0 %
GLUCOSE QUALITATIVE U: NEGATIVE
GLUCOSE SERPL-MCNC: 68 MG/DL
HCT VFR BLD CALC: 40.1 %
HGB BLD-MCNC: 13.2 G/DL
HYALINE CASTS: 1 /LPF
IMM GRANULOCYTES NFR BLD AUTO: 0.3 %
KETONES URINE: NORMAL
LEUKOCYTE ESTERASE URINE: NEGATIVE
LYMPHOCYTES # BLD AUTO: 1.88 K/UL
LYMPHOCYTES NFR BLD AUTO: 25.9 %
MAN DIFF?: NORMAL
MCHC RBC-ENTMCNC: 29.2 PG
MCHC RBC-ENTMCNC: 32.9 GM/DL
MCV RBC AUTO: 88.7 FL
MICROSCOPIC-UA: NORMAL
MONOCYTES # BLD AUTO: 0.75 K/UL
MONOCYTES NFR BLD AUTO: 10.3 %
NEUTROPHILS # BLD AUTO: 4.57 K/UL
NEUTROPHILS NFR BLD AUTO: 63.1 %
NITRITE URINE: NEGATIVE
PH URINE: 6.5
PLATELET # BLD AUTO: 227 K/UL
POTASSIUM SERPL-SCNC: 3.9 MMOL/L
PROT SERPL-MCNC: 7.7 G/DL
PROT UR-MCNC: 101 MG/DL
PROTEIN URINE: ABNORMAL
RBC # BLD: 4.52 M/UL
RBC # FLD: 12.6 %
RED BLOOD CELLS URINE: 2 /HPF
SODIUM SERPL-SCNC: 143 MMOL/L
SPECIFIC GRAVITY URINE: 1.03
SQUAMOUS EPITHELIAL CELLS: 4 /HPF
UROBILINOGEN URINE: NORMAL
WBC # FLD AUTO: 7.25 K/UL
WHITE BLOOD CELLS URINE: 1 /HPF

## 2020-10-22 ENCOUNTER — EMERGENCY (EMERGENCY)
Facility: HOSPITAL | Age: 45
LOS: 1 days | Discharge: HOME | End: 2020-10-22
Admitting: EMERGENCY MEDICINE
Payer: COMMERCIAL

## 2020-10-22 VITALS
SYSTOLIC BLOOD PRESSURE: 125 MMHG | OXYGEN SATURATION: 100 % | RESPIRATION RATE: 16 BRPM | DIASTOLIC BLOOD PRESSURE: 71 MMHG | HEART RATE: 84 BPM

## 2020-10-22 VITALS
DIASTOLIC BLOOD PRESSURE: 89 MMHG | HEART RATE: 97 BPM | HEIGHT: 63 IN | OXYGEN SATURATION: 100 % | RESPIRATION RATE: 16 BRPM | TEMPERATURE: 99 F | SYSTOLIC BLOOD PRESSURE: 145 MMHG

## 2020-10-22 LAB
ALBUMIN SERPL ELPH-MCNC: 4.5 G/DL — SIGNIFICANT CHANGE UP (ref 3.3–5)
ALP SERPL-CCNC: 80 U/L — SIGNIFICANT CHANGE UP (ref 40–120)
ALT FLD-CCNC: 8 U/L — SIGNIFICANT CHANGE UP (ref 4–33)
ANION GAP SERPL CALC-SCNC: 14 MMO/L — SIGNIFICANT CHANGE UP (ref 7–14)
AST SERPL-CCNC: 20 U/L — SIGNIFICANT CHANGE UP (ref 4–32)
BASOPHILS # BLD AUTO: 0.01 K/UL — SIGNIFICANT CHANGE UP (ref 0–0.2)
BASOPHILS NFR BLD AUTO: 0.1 % — SIGNIFICANT CHANGE UP (ref 0–2)
BILIRUB SERPL-MCNC: 0.5 MG/DL — SIGNIFICANT CHANGE UP (ref 0.2–1.2)
BUN SERPL-MCNC: 8 MG/DL — SIGNIFICANT CHANGE UP (ref 7–23)
CALCIUM SERPL-MCNC: 9.7 MG/DL — SIGNIFICANT CHANGE UP (ref 8.4–10.5)
CHLORIDE SERPL-SCNC: 102 MMOL/L — SIGNIFICANT CHANGE UP (ref 98–107)
CO2 SERPL-SCNC: 22 MMOL/L — SIGNIFICANT CHANGE UP (ref 22–31)
CREAT SERPL-MCNC: 0.66 MG/DL — SIGNIFICANT CHANGE UP (ref 0.5–1.3)
EOSINOPHIL # BLD AUTO: 0 K/UL — SIGNIFICANT CHANGE UP (ref 0–0.5)
EOSINOPHIL NFR BLD AUTO: 0 % — SIGNIFICANT CHANGE UP (ref 0–6)
GLUCOSE SERPL-MCNC: 109 MG/DL — HIGH (ref 70–99)
HCG SERPL-ACNC: < 5 MIU/ML — SIGNIFICANT CHANGE UP
HCT VFR BLD CALC: 39.5 % — SIGNIFICANT CHANGE UP (ref 34.5–45)
HGB BLD-MCNC: 13.4 G/DL — SIGNIFICANT CHANGE UP (ref 11.5–15.5)
IMM GRANULOCYTES NFR BLD AUTO: 0.3 % — SIGNIFICANT CHANGE UP (ref 0–1.5)
LYMPHOCYTES # BLD AUTO: 1.4 K/UL — SIGNIFICANT CHANGE UP (ref 1–3.3)
LYMPHOCYTES # BLD AUTO: 18.1 % — SIGNIFICANT CHANGE UP (ref 13–44)
MCHC RBC-ENTMCNC: 28.8 PG — SIGNIFICANT CHANGE UP (ref 27–34)
MCHC RBC-ENTMCNC: 33.9 % — SIGNIFICANT CHANGE UP (ref 32–36)
MCV RBC AUTO: 84.9 FL — SIGNIFICANT CHANGE UP (ref 80–100)
MONOCYTES # BLD AUTO: 0.53 K/UL — SIGNIFICANT CHANGE UP (ref 0–0.9)
MONOCYTES NFR BLD AUTO: 6.9 % — SIGNIFICANT CHANGE UP (ref 2–14)
NEUTROPHILS # BLD AUTO: 5.76 K/UL — SIGNIFICANT CHANGE UP (ref 1.8–7.4)
NEUTROPHILS NFR BLD AUTO: 74.6 % — SIGNIFICANT CHANGE UP (ref 43–77)
NRBC # FLD: 0 K/UL — SIGNIFICANT CHANGE UP (ref 0–0)
PLATELET # BLD AUTO: 208 K/UL — SIGNIFICANT CHANGE UP (ref 150–400)
PMV BLD: 11.4 FL — SIGNIFICANT CHANGE UP (ref 7–13)
POTASSIUM SERPL-MCNC: 3.7 MMOL/L — SIGNIFICANT CHANGE UP (ref 3.5–5.3)
POTASSIUM SERPL-SCNC: 3.7 MMOL/L — SIGNIFICANT CHANGE UP (ref 3.5–5.3)
PROT SERPL-MCNC: 7.8 G/DL — SIGNIFICANT CHANGE UP (ref 6–8.3)
RBC # BLD: 4.65 M/UL — SIGNIFICANT CHANGE UP (ref 3.8–5.2)
RBC # FLD: 12.3 % — SIGNIFICANT CHANGE UP (ref 10.3–14.5)
SODIUM SERPL-SCNC: 138 MMOL/L — SIGNIFICANT CHANGE UP (ref 135–145)
WBC # BLD: 7.72 K/UL — SIGNIFICANT CHANGE UP (ref 3.8–10.5)
WBC # FLD AUTO: 7.72 K/UL — SIGNIFICANT CHANGE UP (ref 3.8–10.5)

## 2020-10-22 PROCEDURE — 99284 EMERGENCY DEPT VISIT MOD MDM: CPT

## 2020-10-22 RX ORDER — SODIUM CHLORIDE 9 MG/ML
1000 INJECTION INTRAMUSCULAR; INTRAVENOUS; SUBCUTANEOUS ONCE
Refills: 0 | Status: COMPLETED | OUTPATIENT
Start: 2020-10-22 | End: 2020-10-22

## 2020-10-22 RX ORDER — CYCLOBENZAPRINE HYDROCHLORIDE 10 MG/1
1 TABLET, FILM COATED ORAL
Qty: 10 | Refills: 0
Start: 2020-10-22 | End: 2020-10-26

## 2020-10-22 RX ORDER — KETOROLAC TROMETHAMINE 30 MG/ML
30 SYRINGE (ML) INJECTION ONCE
Refills: 0 | Status: DISCONTINUED | OUTPATIENT
Start: 2020-10-22 | End: 2020-10-22

## 2020-10-22 RX ADMIN — Medication 30 MILLIGRAM(S): at 03:37

## 2020-10-22 RX ADMIN — SODIUM CHLORIDE 1000 MILLILITER(S): 9 INJECTION INTRAMUSCULAR; INTRAVENOUS; SUBCUTANEOUS at 03:38

## 2020-10-22 NOTE — ED PROVIDER NOTE - OBJECTIVE STATEMENT
44 y/o F h/o SLE p/w occipital headache x tonight. Pt reports tightening pain in occiput that radiates down to neck. Feels tightening in her neck muscles. Has mild paresthesias in RUE. Pt is followed by neurology for episodic tension headaches, is scheduled for outpatient MRI. Pt has not taken anything for pain. No exacerbating or alleviating factors. Pt denies any fever, chills, chest pain, sob, cough, abd pain, n/v/d, dizziness, vision change. No sick contacts or recent travel.

## 2020-10-22 NOTE — ED PROVIDER NOTE - NS_EDPROVIDERDISPOUSERTYPE_ED_A_ED
I have personally evaluated and examined the patient. The Attending was available to me as a supervising provider if needed.
no

## 2020-10-22 NOTE — ED PROVIDER NOTE - PATIENT PORTAL LINK FT
You can access the FollowMyHealth Patient Portal offered by [facility] by registering at the following website: [email or web address]. By joining [facility]’s FollowMyHealth portal, you will also be able to view your health information using other applications (apps) compatible with our system.

## 2020-10-22 NOTE — ED PROVIDER NOTE - PROGRESS NOTE DETAILS
Pt feeling better. Headache has improved. Labs unremarkable. Advised to follow up with neurology regarding headaches. If any worsening symptoms return to ED. VSS.

## 2020-10-22 NOTE — ED ADULT TRIAGE NOTE - CCCP TRG CHIEF CMPLNT
Today's exam, diagnostic studies, and/or review of records show NO SIGNIFICANT CHANGE from previous exams. headache/neck stiffness

## 2020-10-22 NOTE — ED PROVIDER NOTE - CLINICAL SUMMARY MEDICAL DECISION MAKING FREE TEXT BOX
44 y/o F h/o SLE p/w occipital headache x tonight. Pt reports tightening pain in occiput that radiates down to neck.   plan  - labs  - pain control  - ivf   - reassess 44 y/o F h/o SLE p/w occipital headache x tonight. Pt reports tightening pain in occiput that radiates down to neck. No meningeal signs. Afebrile. likely recurrent tension headache  plan  - labs  - pain control  - ivf   - reassess

## 2020-10-22 NOTE — ED PROVIDER NOTE - NEUROLOGICAL, MLM
Alert and oriented, no focal deficits, no motor or sensory deficits. No pronator drift. No dysmetria. No gait instability. Negative Kernig's and Burdinski sign.

## 2020-10-22 NOTE — ED ADULT TRIAGE NOTE - CHIEF COMPLAINT QUOTE
pt c/o headache, neck stiffness x 5 days. pt was diagnosed with tension headaches. pt has been taking exendin and Sudafed with some relief. No complaints of chest pain, nausea, dizziness, vomiting  SOB, fever, chills verbalized.

## 2020-10-29 ENCOUNTER — APPOINTMENT (OUTPATIENT)
Dept: MRI IMAGING | Facility: CLINIC | Age: 45
End: 2020-10-29
Payer: COMMERCIAL

## 2020-10-29 ENCOUNTER — OUTPATIENT (OUTPATIENT)
Dept: OUTPATIENT SERVICES | Facility: HOSPITAL | Age: 45
LOS: 1 days | End: 2020-10-29
Payer: COMMERCIAL

## 2020-10-29 DIAGNOSIS — Z00.8 ENCOUNTER FOR OTHER GENERAL EXAMINATION: ICD-10-CM

## 2020-10-29 PROCEDURE — 70553 MRI BRAIN STEM W/O & W/DYE: CPT

## 2020-10-29 PROCEDURE — A9585: CPT

## 2020-10-29 PROCEDURE — 70553 MRI BRAIN STEM W/O & W/DYE: CPT | Mod: 26

## 2020-11-03 ENCOUNTER — APPOINTMENT (OUTPATIENT)
Dept: OTOLARYNGOLOGY | Facility: CLINIC | Age: 45
End: 2020-11-03

## 2020-11-06 ENCOUNTER — NON-APPOINTMENT (OUTPATIENT)
Age: 45
End: 2020-11-06

## 2020-12-09 ENCOUNTER — APPOINTMENT (OUTPATIENT)
Dept: PHYSICAL MEDICINE AND REHAB | Facility: CLINIC | Age: 45
End: 2020-12-09

## 2020-12-15 PROBLEM — N39.0 UTI (URINARY TRACT INFECTION), UNCOMPLICATED: Status: RESOLVED | Noted: 2017-10-11 | Resolved: 2020-12-15

## 2020-12-16 PROBLEM — N76.0 BACTERIAL VULVOVAGINITIS: Status: RESOLVED | Noted: 2017-12-21 | Resolved: 2020-12-16

## 2020-12-23 PROBLEM — Z01.419 ENCOUNTER FOR GYNECOLOGICAL EXAMINATION WITHOUT ABNORMAL FINDING: Status: RESOLVED | Noted: 2020-06-10 | Resolved: 2020-12-23

## 2021-02-02 NOTE — PROGRESS NOTE ADULT - PROBLEM/PLAN-1
[FreeTextEntry2] : Alin  returns for evaluation of autoimmune thyroid disease He was seen in 9/20  for evaluation of a TSH of 10.8  MI U/mL with a normal free T4 of 1 NG/DL obtained 8/20.  The blood work was routine but mom had brought him in for concerns about some behavioral changes such as tics and some obsessive/compulsive behaviors which had subsequently improved.   At the time of the initial visit antithyroid antibodies were found to be strongly positive.  TSH had significantly improved to 5.09 MI U/L so therapy was not begun and close observation was suggested.\par \par Alin  is a healthy child.  There have  been no previous concerns about growth or development. \par \par Alin has been well, He  only needed to see the  PMD for strep and now has "a little cough" 
DISPLAY PLAN FREE TEXT

## 2021-02-23 ENCOUNTER — APPOINTMENT (OUTPATIENT)
Dept: RHEUMATOLOGY | Facility: CLINIC | Age: 46
End: 2021-02-23
Payer: COMMERCIAL

## 2021-02-23 VITALS
RESPIRATION RATE: 16 BRPM | WEIGHT: 150 LBS | BODY MASS INDEX: 26.58 KG/M2 | SYSTOLIC BLOOD PRESSURE: 122 MMHG | DIASTOLIC BLOOD PRESSURE: 72 MMHG | OXYGEN SATURATION: 98 % | TEMPERATURE: 97 F | HEART RATE: 80 BPM | HEIGHT: 63 IN

## 2021-02-23 PROCEDURE — 99072 ADDL SUPL MATRL&STAF TM PHE: CPT

## 2021-02-23 PROCEDURE — 99214 OFFICE O/P EST MOD 30 MIN: CPT

## 2021-02-24 LAB
25(OH)D3 SERPL-MCNC: 47.6 NG/ML
ALBUMIN SERPL ELPH-MCNC: 4.3 G/DL
ALP BLD-CCNC: 93 U/L
ALT SERPL-CCNC: 15 U/L
ANION GAP SERPL CALC-SCNC: 13 MMOL/L
APPEARANCE: CLEAR
APTT BLD: 29.1 SEC
AST SERPL-CCNC: 22 U/L
BACTERIA: NEGATIVE
BASOPHILS # BLD AUTO: 0.01 K/UL
BASOPHILS NFR BLD AUTO: 0.2 %
BILIRUB SERPL-MCNC: 0.3 MG/DL
BILIRUBIN URINE: NEGATIVE
BLOOD URINE: NEGATIVE
BUN SERPL-MCNC: 19 MG/DL
C3 SERPL-MCNC: 98 MG/DL
C4 SERPL-MCNC: 11 MG/DL
CALCIUM SERPL-MCNC: 9.3 MG/DL
CHLORIDE SERPL-SCNC: 100 MMOL/L
CK SERPL-CCNC: 65 U/L
CO2 SERPL-SCNC: 24 MMOL/L
COLOR: NORMAL
CONFIRM: 27.3 SEC
CREAT SERPL-MCNC: 0.87 MG/DL
DRVVT IMM 1:2 NP PPP: NORMAL
DRVVT SCREEN TO CONFIRM RATIO: 0.97 RATIO
EOSINOPHIL # BLD AUTO: 0.06 K/UL
EOSINOPHIL NFR BLD AUTO: 1 %
GLUCOSE QUALITATIVE U: NEGATIVE
HCT VFR BLD CALC: 38.8 %
HGB BLD-MCNC: 13.1 G/DL
HYALINE CASTS: 0 /LPF
IMM GRANULOCYTES NFR BLD AUTO: 0.3 %
INR PPP: 0.88 RATIO
KETONES URINE: NEGATIVE
LEUKOCYTE ESTERASE URINE: NEGATIVE
LYMPHOCYTES # BLD AUTO: 2.22 K/UL
LYMPHOCYTES NFR BLD AUTO: 36.8 %
MAN DIFF?: NORMAL
MCHC RBC-ENTMCNC: 29.8 PG
MCHC RBC-ENTMCNC: 33.8 GM/DL
MCV RBC AUTO: 88.2 FL
MICROSCOPIC-UA: NORMAL
MONOCYTES # BLD AUTO: 0.53 K/UL
MONOCYTES NFR BLD AUTO: 8.8 %
NEUTROPHILS # BLD AUTO: 3.2 K/UL
NEUTROPHILS NFR BLD AUTO: 52.9 %
NITRITE URINE: NEGATIVE
PH URINE: 6
PLATELET # BLD AUTO: 193 K/UL
POTASSIUM SERPL-SCNC: 4.3 MMOL/L
PROT SERPL-MCNC: 7.5 G/DL
PROTEIN URINE: NEGATIVE
PT BLD: 10.5 SEC
RBC # BLD: 4.4 M/UL
RBC # FLD: 12.4 %
RED BLOOD CELLS URINE: 0 /HPF
SCREEN DRVVT: 35.5 SEC
SILICA CLOTTING TIME INTERPRETATION: NORMAL
SILICA CLOTTING TIME S/C: 0.81 RATIO
SODIUM SERPL-SCNC: 138 MMOL/L
SPECIFIC GRAVITY URINE: 1.01
SQUAMOUS EPITHELIAL CELLS: 3 /HPF
UROBILINOGEN URINE: NORMAL
WBC # FLD AUTO: 6.04 K/UL
WHITE BLOOD CELLS URINE: 0 /HPF

## 2021-02-25 LAB
B2 GLYCOPROT1 IGG SER-ACNC: <5 SGU
B2 GLYCOPROT1 IGM SER-ACNC: 6.8 SMU
CARDIOLIPIN IGM SER-MCNC: 9.1 MPL
CARDIOLIPIN IGM SER-MCNC: <5 GPL
CREAT SPEC-SCNC: 38 MG/DL
CREAT/PROT UR: 0.4 RATIO
DEPRECATED CARDIOLIPIN IGA SER: <5 APL
PROT UR-MCNC: 16 MG/DL

## 2021-02-26 LAB
B2 GLYCOPROT1 IGA SERPL IA-ACNC: 120.8 SAU
DSDNA AB SER-ACNC: 265 IU/ML

## 2021-03-11 LAB
MISCELLANEOUS TEST: NORMAL
PROC NAME: NORMAL

## 2021-03-29 ENCOUNTER — NON-APPOINTMENT (OUTPATIENT)
Age: 46
End: 2021-03-29

## 2021-03-29 ENCOUNTER — APPOINTMENT (OUTPATIENT)
Dept: OBGYN | Facility: CLINIC | Age: 46
End: 2021-03-29
Payer: COMMERCIAL

## 2021-03-29 VITALS
BODY MASS INDEX: 28.17 KG/M2 | WEIGHT: 159 LBS | SYSTOLIC BLOOD PRESSURE: 112 MMHG | DIASTOLIC BLOOD PRESSURE: 70 MMHG | HEIGHT: 63 IN

## 2021-03-29 DIAGNOSIS — N76.0 ACUTE VAGINITIS: ICD-10-CM

## 2021-03-29 DIAGNOSIS — B96.89 ACUTE VAGINITIS: ICD-10-CM

## 2021-03-29 DIAGNOSIS — Z11.3 ENCOUNTER FOR SCREENING FOR INFECTIONS WITH A PREDOMINANTLY SEXUAL MODE OF TRANSMISSION: ICD-10-CM

## 2021-03-29 LAB
BILIRUB UR QL STRIP: NORMAL
GLUCOSE UR-MCNC: NORMAL
HCG UR QL: 0.2 EU/DL
HGB UR QL STRIP.AUTO: NORMAL
KETONES UR-MCNC: NORMAL
LEUKOCYTE ESTERASE UR QL STRIP: NORMAL
NITRITE UR QL STRIP: NORMAL
PH UR STRIP: 6.5
PROT UR STRIP-MCNC: NORMAL
SP GR UR STRIP: 1.01

## 2021-03-29 PROCEDURE — 81003 URINALYSIS AUTO W/O SCOPE: CPT | Mod: QW

## 2021-03-29 PROCEDURE — 99213 OFFICE O/P EST LOW 20 MIN: CPT

## 2021-03-29 PROCEDURE — 99072 ADDL SUPL MATRL&STAF TM PHE: CPT

## 2021-03-29 RX ORDER — DIAZEPAM 5 MG/1
5 TABLET ORAL
Qty: 4 | Refills: 0 | Status: DISCONTINUED | COMMUNITY
Start: 2020-10-21 | End: 2021-03-29

## 2021-03-30 LAB — T PALLIDUM AB SER QL IA: NEGATIVE

## 2021-04-01 LAB
C TRACH RRNA SPEC QL NAA+PROBE: NOT DETECTED
CANDIDA VAG CYTO: NOT DETECTED
G VAGINALIS+PREV SP MTYP VAG QL MICRO: DETECTED
HBV SURFACE AG SER QL: NONREACTIVE
HCV AB SER QL: NONREACTIVE
HCV S/CO RATIO: 0.09 S/CO
HIV1+2 AB SPEC QL IA.RAPID: NONREACTIVE
N GONORRHOEA RRNA SPEC QL NAA+PROBE: NOT DETECTED
SOURCE AMPLIFICATION: NORMAL
T VAGINALIS VAG QL WET PREP: NOT DETECTED

## 2021-04-14 ENCOUNTER — APPOINTMENT (OUTPATIENT)
Dept: OBGYN | Facility: CLINIC | Age: 46
End: 2021-04-14
Payer: COMMERCIAL

## 2021-04-14 VITALS
DIASTOLIC BLOOD PRESSURE: 70 MMHG | WEIGHT: 158 LBS | HEIGHT: 63 IN | BODY MASS INDEX: 28 KG/M2 | SYSTOLIC BLOOD PRESSURE: 110 MMHG

## 2021-04-14 DIAGNOSIS — R10.9 UNSPECIFIED ABDOMINAL PAIN: ICD-10-CM

## 2021-04-14 PROCEDURE — 99072 ADDL SUPL MATRL&STAF TM PHE: CPT

## 2021-04-14 PROCEDURE — 99213 OFFICE O/P EST LOW 20 MIN: CPT

## 2021-04-22 ENCOUNTER — APPOINTMENT (OUTPATIENT)
Dept: ORTHOPEDIC SURGERY | Facility: CLINIC | Age: 46
End: 2021-04-22
Payer: COMMERCIAL

## 2021-04-22 VITALS
DIASTOLIC BLOOD PRESSURE: 76 MMHG | HEIGHT: 63 IN | BODY MASS INDEX: 27.64 KG/M2 | HEART RATE: 78 BPM | WEIGHT: 156 LBS | SYSTOLIC BLOOD PRESSURE: 119 MMHG

## 2021-04-22 PROCEDURE — 99202 OFFICE O/P NEW SF 15 MIN: CPT

## 2021-04-22 PROCEDURE — 99072 ADDL SUPL MATRL&STAF TM PHE: CPT

## 2021-04-22 PROCEDURE — 73030 X-RAY EXAM OF SHOULDER: CPT | Mod: LT

## 2021-06-04 ENCOUNTER — APPOINTMENT (OUTPATIENT)
Dept: RHEUMATOLOGY | Facility: CLINIC | Age: 46
End: 2021-06-04
Payer: COMMERCIAL

## 2021-06-04 VITALS
WEIGHT: 156 LBS | SYSTOLIC BLOOD PRESSURE: 124 MMHG | TEMPERATURE: 97.6 F | RESPIRATION RATE: 16 BRPM | HEART RATE: 79 BPM | DIASTOLIC BLOOD PRESSURE: 71 MMHG | BODY MASS INDEX: 27.64 KG/M2 | HEIGHT: 63 IN | OXYGEN SATURATION: 98 %

## 2021-06-04 DIAGNOSIS — M75.82 OTHER SHOULDER LESIONS, LEFT SHOULDER: ICD-10-CM

## 2021-06-04 DIAGNOSIS — R11.0 NAUSEA: ICD-10-CM

## 2021-06-04 PROCEDURE — 99072 ADDL SUPL MATRL&STAF TM PHE: CPT

## 2021-06-04 PROCEDURE — 99214 OFFICE O/P EST MOD 30 MIN: CPT

## 2021-06-05 LAB
25(OH)D3 SERPL-MCNC: 43 NG/ML
ALBUMIN SERPL ELPH-MCNC: 4.3 G/DL
ALP BLD-CCNC: 83 U/L
ALT SERPL-CCNC: 14 U/L
ANION GAP SERPL CALC-SCNC: 15 MMOL/L
APPEARANCE: CLEAR
AST SERPL-CCNC: 25 U/L
BACTERIA: NEGATIVE
BASOPHILS # BLD AUTO: 0.02 K/UL
BASOPHILS NFR BLD AUTO: 0.3 %
BILIRUB SERPL-MCNC: 0.5 MG/DL
BILIRUBIN URINE: NEGATIVE
BLOOD URINE: NEGATIVE
BUN SERPL-MCNC: 12 MG/DL
CALCIUM SERPL-MCNC: 9.4 MG/DL
CHLORIDE SERPL-SCNC: 104 MMOL/L
CK SERPL-CCNC: 59 U/L
CO2 SERPL-SCNC: 20 MMOL/L
COLOR: YELLOW
CREAT SERPL-MCNC: 0.65 MG/DL
CREAT SPEC-SCNC: 135 MG/DL
CREAT/PROT UR: 0.2 RATIO
ENA RNP AB SER IA-ACNC: 3.7 AL
ENA SM AB SER IA-ACNC: 7.4 AL
ENA SS-A AB SER IA-ACNC: <0.2 AL
ENA SS-B AB SER IA-ACNC: <0.2 AL
EOSINOPHIL # BLD AUTO: 0.01 K/UL
EOSINOPHIL NFR BLD AUTO: 0.2 %
GLUCOSE QUALITATIVE U: NEGATIVE
HCT VFR BLD CALC: 41.5 %
HGB BLD-MCNC: 13.6 G/DL
HYALINE CASTS: 0 /LPF
IMM GRANULOCYTES NFR BLD AUTO: 0.2 %
KETONES URINE: NEGATIVE
LEUKOCYTE ESTERASE URINE: NEGATIVE
LYMPHOCYTES # BLD AUTO: 1.75 K/UL
LYMPHOCYTES NFR BLD AUTO: 29.8 %
MAN DIFF?: NORMAL
MCHC RBC-ENTMCNC: 29.1 PG
MCHC RBC-ENTMCNC: 32.8 GM/DL
MCV RBC AUTO: 88.9 FL
MICROSCOPIC-UA: NORMAL
MONOCYTES # BLD AUTO: 0.59 K/UL
MONOCYTES NFR BLD AUTO: 10.1 %
NEUTROPHILS # BLD AUTO: 3.49 K/UL
NEUTROPHILS NFR BLD AUTO: 59.4 %
NITRITE URINE: NEGATIVE
PH URINE: 6.5
PLATELET # BLD AUTO: 198 K/UL
POTASSIUM SERPL-SCNC: 4.2 MMOL/L
PROT SERPL-MCNC: 7.7 G/DL
PROT UR-MCNC: 32 MG/DL
PROTEIN URINE: ABNORMAL
RBC # BLD: 4.67 M/UL
RBC # FLD: 12.8 %
RED BLOOD CELLS URINE: 2 /HPF
SODIUM SERPL-SCNC: 140 MMOL/L
SPECIFIC GRAVITY URINE: 1.02
SQUAMOUS EPITHELIAL CELLS: 12 /HPF
UROBILINOGEN URINE: NORMAL
WBC # FLD AUTO: 5.87 K/UL
WHITE BLOOD CELLS URINE: 2 /HPF

## 2021-06-07 LAB
C3 SERPL-MCNC: 112 MG/DL
C4 SERPL-MCNC: 12 MG/DL
CRYOCRIT SER SPUN WESTERGREN: 0 MM

## 2021-06-08 LAB — DSDNA AB SER-ACNC: 266 IU/ML

## 2021-06-24 NOTE — PATIENT PROFILE ADULT. - ANESTHESIA, PREVIOUS REACTION, PROFILE
Walking Oximetry  Performed by: Jeyson Garcia APRN  Authorized by: Jeyson Garcia APRN     Rest room air SAT %:  92  Exercise room air SAT %:  87  Rest on O2 @ Liters:  2  SAT %:  98  Exercise on O2 @ Liters:  2  SAT %:  98      
none

## 2021-06-28 ENCOUNTER — APPOINTMENT (OUTPATIENT)
Dept: OBGYN | Facility: CLINIC | Age: 46
End: 2021-06-28
Payer: COMMERCIAL

## 2021-06-28 VITALS
SYSTOLIC BLOOD PRESSURE: 102 MMHG | HEIGHT: 63 IN | DIASTOLIC BLOOD PRESSURE: 78 MMHG | WEIGHT: 160 LBS | BODY MASS INDEX: 28.35 KG/M2

## 2021-06-28 PROCEDURE — 36415 COLL VENOUS BLD VENIPUNCTURE: CPT

## 2021-06-28 PROCEDURE — 99396 PREV VISIT EST AGE 40-64: CPT

## 2021-06-28 PROCEDURE — 99072 ADDL SUPL MATRL&STAF TM PHE: CPT

## 2021-06-28 NOTE — HISTORY OF PRESENT ILLNESS
[FreeTextEntry1] : patient presents today for routine annual exam.\par  [postmenopausal] : postmenopausal [TextBox_4] : no complaints  [PapSmeardate] : 6/2020 [Mammogramdate] : 8/2020 [LMPDate] : 2017

## 2021-06-28 NOTE — PHYSICAL EXAM
[Appropriately responsive] : appropriately responsive [Alert] : alert [No Acute Distress] : no acute distress [No Lymphadenopathy] : no lymphadenopathy [Regular Rate Rhythm] : regular rate rhythm [No Murmurs] : no murmurs [Clear to Auscultation B/L] : clear to auscultation bilaterally [Soft] : soft [Non-tender] : non-tender [Non-distended] : non-distended [No HSM] : No HSM [No Lesions] : no lesions [No Mass] : no mass [Oriented x3] : oriented x3 [Examination Of The Breasts] : a normal appearance [No Masses] : no breast masses were palpable [Labia Minora] : normal [Labia Majora] : normal [Normal] : normal [Absent] : absent [Uterine Adnexae] : non-palpable

## 2021-06-29 ENCOUNTER — RX RENEWAL (OUTPATIENT)
Age: 46
End: 2021-06-29

## 2021-06-29 LAB
FSH SERPL-MCNC: 36.2 IU/L
LH SERPL-ACNC: 16.6 IU/L

## 2021-07-02 LAB — CYTOLOGY CVX/VAG DOC THIN PREP: ABNORMAL

## 2021-07-18 ENCOUNTER — RX RENEWAL (OUTPATIENT)
Age: 46
End: 2021-07-18

## 2021-07-26 NOTE — BRIEF OPERATIVE NOTE - TYPE OF ANESTHESIA
General
Afib  on eliquis  Ex-Cigarette Smoker    HTN (hypertension)    Hyperlipidemia    Inguinal hernia  right  Osteoarthrosis, localized, primary, involving lower leg

## 2021-07-29 ENCOUNTER — APPOINTMENT (OUTPATIENT)
Dept: ORTHOPEDIC SURGERY | Facility: CLINIC | Age: 46
End: 2021-07-29
Payer: COMMERCIAL

## 2021-07-29 VITALS
BODY MASS INDEX: 28.35 KG/M2 | HEART RATE: 74 BPM | SYSTOLIC BLOOD PRESSURE: 123 MMHG | DIASTOLIC BLOOD PRESSURE: 84 MMHG | WEIGHT: 160 LBS | HEIGHT: 63 IN

## 2021-07-29 DIAGNOSIS — M75.02 ADHESIVE CAPSULITIS OF LEFT SHOULDER: ICD-10-CM

## 2021-07-29 DIAGNOSIS — M25.512 PAIN IN LEFT SHOULDER: ICD-10-CM

## 2021-07-29 PROCEDURE — 99213 OFFICE O/P EST LOW 20 MIN: CPT

## 2021-07-29 PROCEDURE — 99072 ADDL SUPL MATRL&STAF TM PHE: CPT

## 2021-08-19 ENCOUNTER — RESULT REVIEW (OUTPATIENT)
Age: 46
End: 2021-08-19

## 2021-08-19 ENCOUNTER — OUTPATIENT (OUTPATIENT)
Dept: OUTPATIENT SERVICES | Facility: HOSPITAL | Age: 46
LOS: 1 days | End: 2021-08-19
Payer: COMMERCIAL

## 2021-08-19 ENCOUNTER — APPOINTMENT (OUTPATIENT)
Dept: ULTRASOUND IMAGING | Facility: IMAGING CENTER | Age: 46
End: 2021-08-19
Payer: COMMERCIAL

## 2021-08-19 ENCOUNTER — APPOINTMENT (OUTPATIENT)
Dept: MAMMOGRAPHY | Facility: IMAGING CENTER | Age: 46
End: 2021-08-19
Payer: COMMERCIAL

## 2021-08-19 ENCOUNTER — OUTPATIENT (OUTPATIENT)
Dept: OUTPATIENT SERVICES | Facility: HOSPITAL | Age: 46
LOS: 1 days | End: 2021-08-19

## 2021-08-19 DIAGNOSIS — Z01.411 ENCOUNTER FOR GYNECOLOGICAL EXAMINATION (GENERAL) (ROUTINE) WITH ABNORMAL FINDINGS: ICD-10-CM

## 2021-08-19 DIAGNOSIS — Z00.8 ENCOUNTER FOR OTHER GENERAL EXAMINATION: ICD-10-CM

## 2021-08-19 PROCEDURE — 77067 SCR MAMMO BI INCL CAD: CPT

## 2021-08-19 PROCEDURE — 77063 BREAST TOMOSYNTHESIS BI: CPT | Mod: 26

## 2021-08-19 PROCEDURE — 76641 ULTRASOUND BREAST COMPLETE: CPT

## 2021-08-19 PROCEDURE — 77067 SCR MAMMO BI INCL CAD: CPT | Mod: 26

## 2021-08-19 PROCEDURE — 76536 US EXAM OF HEAD AND NECK: CPT

## 2021-08-19 PROCEDURE — 76641 ULTRASOUND BREAST COMPLETE: CPT | Mod: 26,50

## 2021-08-19 PROCEDURE — 76536 US EXAM OF HEAD AND NECK: CPT | Mod: 26

## 2021-08-19 PROCEDURE — 77063 BREAST TOMOSYNTHESIS BI: CPT

## 2021-08-20 ENCOUNTER — NON-APPOINTMENT (OUTPATIENT)
Age: 46
End: 2021-08-20

## 2021-09-09 ENCOUNTER — NON-APPOINTMENT (OUTPATIENT)
Age: 46
End: 2021-09-09

## 2021-09-09 ENCOUNTER — APPOINTMENT (OUTPATIENT)
Dept: OBGYN | Facility: CLINIC | Age: 46
End: 2021-09-09
Payer: COMMERCIAL

## 2021-09-09 VITALS
SYSTOLIC BLOOD PRESSURE: 138 MMHG | WEIGHT: 160 LBS | BODY MASS INDEX: 28.35 KG/M2 | DIASTOLIC BLOOD PRESSURE: 72 MMHG | HEIGHT: 63 IN

## 2021-09-09 PROCEDURE — 99213 OFFICE O/P EST LOW 20 MIN: CPT

## 2021-09-12 LAB
HSV+VZV DNA SPEC QL NAA+PROBE: NOT DETECTED
SPECIMEN SOURCE: NORMAL

## 2021-11-16 ENCOUNTER — APPOINTMENT (OUTPATIENT)
Dept: RHEUMATOLOGY | Facility: CLINIC | Age: 46
End: 2021-11-16
Payer: COMMERCIAL

## 2021-11-16 VITALS
TEMPERATURE: 97 F | WEIGHT: 160 LBS | DIASTOLIC BLOOD PRESSURE: 82 MMHG | BODY MASS INDEX: 28.35 KG/M2 | OXYGEN SATURATION: 98 % | RESPIRATION RATE: 16 BRPM | HEIGHT: 63 IN | HEART RATE: 78 BPM | SYSTOLIC BLOOD PRESSURE: 121 MMHG

## 2021-11-16 LAB
APPEARANCE: CLEAR
BACTERIA: NEGATIVE
BASOPHILS # BLD AUTO: 0.03 K/UL
BASOPHILS NFR BLD AUTO: 0.6 %
BILIRUBIN URINE: NEGATIVE
BLOOD URINE: NEGATIVE
COLOR: NORMAL
EOSINOPHIL # BLD AUTO: 0.03 K/UL
EOSINOPHIL NFR BLD AUTO: 0.6 %
GLUCOSE QUALITATIVE U: NEGATIVE
HCT VFR BLD CALC: 39.3 %
HGB BLD-MCNC: 13 G/DL
HYALINE CASTS: 0 /LPF
IMM GRANULOCYTES NFR BLD AUTO: 0.2 %
KETONES URINE: NEGATIVE
LEUKOCYTE ESTERASE URINE: NEGATIVE
LYMPHOCYTES # BLD AUTO: 2.48 K/UL
LYMPHOCYTES NFR BLD AUTO: 46.7 %
MAN DIFF?: NORMAL
MCHC RBC-ENTMCNC: 29.3 PG
MCHC RBC-ENTMCNC: 33.1 GM/DL
MCV RBC AUTO: 88.5 FL
MICROSCOPIC-UA: NORMAL
MONOCYTES # BLD AUTO: 0.46 K/UL
MONOCYTES NFR BLD AUTO: 8.7 %
NEUTROPHILS # BLD AUTO: 2.3 K/UL
NEUTROPHILS NFR BLD AUTO: 43.2 %
NITRITE URINE: NEGATIVE
PH URINE: 6.5
PLATELET # BLD AUTO: 205 K/UL
PROTEIN URINE: ABNORMAL
RBC # BLD: 4.44 M/UL
RBC # FLD: 12.8 %
RED BLOOD CELLS URINE: 1 /HPF
SPECIFIC GRAVITY URINE: 1.03
SQUAMOUS EPITHELIAL CELLS: 3 /HPF
UROBILINOGEN URINE: NORMAL
WBC # FLD AUTO: 5.31 K/UL
WHITE BLOOD CELLS URINE: 1 /HPF

## 2021-11-16 PROCEDURE — 99214 OFFICE O/P EST MOD 30 MIN: CPT

## 2021-11-17 LAB
25(OH)D3 SERPL-MCNC: 32.2 NG/ML
ALBUMIN SERPL ELPH-MCNC: 4.2 G/DL
ALP BLD-CCNC: 97 U/L
ALT SERPL-CCNC: 12 U/L
ANION GAP SERPL CALC-SCNC: 12 MMOL/L
AST SERPL-CCNC: 24 U/L
BILIRUB SERPL-MCNC: 0.2 MG/DL
BUN SERPL-MCNC: 18 MG/DL
C3 SERPL-MCNC: 98 MG/DL
C4 SERPL-MCNC: 10 MG/DL
CALCIUM SERPL-MCNC: 9.3 MG/DL
CHLORIDE SERPL-SCNC: 106 MMOL/L
CK SERPL-CCNC: 66 U/L
CO2 SERPL-SCNC: 23 MMOL/L
COVID-19 SPIKE DOMAIN ANTIBODY INTERPRETATION: POSITIVE
CREAT SERPL-MCNC: 0.69 MG/DL
CREAT SPEC-SCNC: 118 MG/DL
CREAT/PROT UR: 0.3 RATIO
DSDNA AB SER-ACNC: 348 IU/ML
POTASSIUM SERPL-SCNC: 4.1 MMOL/L
PROT SERPL-MCNC: 7.5 G/DL
PROT UR-MCNC: 34 MG/DL
SARS-COV-2 AB SERPL IA-ACNC: >250 U/ML
SODIUM SERPL-SCNC: 141 MMOL/L

## 2021-11-27 LAB
MISCELLANEOUS TEST: NORMAL
PROC NAME: NORMAL

## 2021-12-06 NOTE — DISCHARGE NOTE ADULT - CAREGIVER ADDRESS
Impression: Benign neoplasm of left choroid Plan: Benign choroidal nevus - no orange pigment, no retinal fluid, flat.  Appears benign (fundus photos today 10/26/20) - review in 12 months with OCT macula, possible optos photo, sooner PRN [phone] [place] [id number]

## 2022-01-19 ENCOUNTER — NON-APPOINTMENT (OUTPATIENT)
Age: 47
End: 2022-01-19

## 2022-01-19 ENCOUNTER — APPOINTMENT (OUTPATIENT)
Dept: OBGYN | Facility: CLINIC | Age: 47
End: 2022-01-19
Payer: COMMERCIAL

## 2022-01-19 VITALS
BODY MASS INDEX: 28.35 KG/M2 | WEIGHT: 160 LBS | DIASTOLIC BLOOD PRESSURE: 60 MMHG | HEIGHT: 63 IN | SYSTOLIC BLOOD PRESSURE: 108 MMHG

## 2022-01-19 DIAGNOSIS — Z87.42 PERSONAL HISTORY OF OTHER DISEASES OF THE FEMALE GENITAL TRACT: ICD-10-CM

## 2022-01-19 DIAGNOSIS — N76.0 ACUTE VAGINITIS: ICD-10-CM

## 2022-01-19 PROCEDURE — 99213 OFFICE O/P EST LOW 20 MIN: CPT

## 2022-02-14 PROCEDURE — 0: CUSTOM

## 2022-02-22 ENCOUNTER — RX RENEWAL (OUTPATIENT)
Age: 47
End: 2022-02-22

## 2022-04-29 ENCOUNTER — APPOINTMENT (OUTPATIENT)
Dept: ORTHOPEDIC SURGERY | Facility: CLINIC | Age: 47
End: 2022-04-29
Payer: COMMERCIAL

## 2022-04-29 VITALS
SYSTOLIC BLOOD PRESSURE: 116 MMHG | DIASTOLIC BLOOD PRESSURE: 76 MMHG | BODY MASS INDEX: 28.35 KG/M2 | WEIGHT: 160 LBS | HEIGHT: 63 IN | HEART RATE: 90 BPM

## 2022-04-29 DIAGNOSIS — M22.2X1 PATELLOFEMORAL DISORDERS, RIGHT KNEE: ICD-10-CM

## 2022-04-29 DIAGNOSIS — M22.2X2 PATELLOFEMORAL DISORDERS, RIGHT KNEE: ICD-10-CM

## 2022-04-29 DIAGNOSIS — M25.561 PAIN IN RIGHT KNEE: ICD-10-CM

## 2022-04-29 DIAGNOSIS — M22.41 CHONDROMALACIA PATELLAE, RIGHT KNEE: ICD-10-CM

## 2022-04-29 DIAGNOSIS — M22.42 CHONDROMALACIA PATELLAE, LEFT KNEE: ICD-10-CM

## 2022-04-29 PROCEDURE — 73562 X-RAY EXAM OF KNEE 3: CPT | Mod: RT

## 2022-04-29 PROCEDURE — 99213 OFFICE O/P EST LOW 20 MIN: CPT

## 2022-06-03 ENCOUNTER — APPOINTMENT (OUTPATIENT)
Dept: OPHTHALMOLOGY | Facility: CLINIC | Age: 47
End: 2022-06-03

## 2022-06-07 ENCOUNTER — APPOINTMENT (OUTPATIENT)
Dept: RHEUMATOLOGY | Facility: CLINIC | Age: 47
End: 2022-06-07
Payer: COMMERCIAL

## 2022-06-07 ENCOUNTER — LABORATORY RESULT (OUTPATIENT)
Age: 47
End: 2022-06-07

## 2022-06-07 VITALS
TEMPERATURE: 97.7 F | HEIGHT: 63 IN | SYSTOLIC BLOOD PRESSURE: 128 MMHG | OXYGEN SATURATION: 96 % | HEART RATE: 80 BPM | BODY MASS INDEX: 27.46 KG/M2 | DIASTOLIC BLOOD PRESSURE: 88 MMHG | WEIGHT: 155 LBS

## 2022-06-07 DIAGNOSIS — J34.89 OTHER SPECIFIED DISORDERS OF NOSE AND NASAL SINUSES: ICD-10-CM

## 2022-06-07 PROCEDURE — 99214 OFFICE O/P EST MOD 30 MIN: CPT

## 2022-06-08 LAB
25(OH)D3 SERPL-MCNC: 40.4 NG/ML
ALBUMIN SERPL ELPH-MCNC: 4.5 G/DL
ALP BLD-CCNC: 92 U/L
ALT SERPL-CCNC: 14 U/L
ANION GAP SERPL CALC-SCNC: 15 MMOL/L
APPEARANCE: CLEAR
AST SERPL-CCNC: 34 U/L
BACTERIA: NEGATIVE
BASOPHILS # BLD AUTO: 0.03 K/UL
BASOPHILS NFR BLD AUTO: 0.5 %
BILIRUB SERPL-MCNC: 0.4 MG/DL
BILIRUBIN URINE: NEGATIVE
BLOOD URINE: NEGATIVE
BUN SERPL-MCNC: 11 MG/DL
C3 SERPL-MCNC: 104 MG/DL
C4 SERPL-MCNC: 13 MG/DL
CALCIUM SERPL-MCNC: 9.6 MG/DL
CHLORIDE SERPL-SCNC: 103 MMOL/L
CK SERPL-CCNC: 70 U/L
CO2 SERPL-SCNC: 22 MMOL/L
COLOR: YELLOW
CREAT SERPL-MCNC: 0.73 MG/DL
CREAT SPEC-SCNC: 166 MG/DL
CREAT/PROT UR: 0.2 RATIO
DSDNA AB SER-ACNC: 274 IU/ML
EGFR: 101 ML/MIN/1.73M2
ENA RNP AB SER IA-ACNC: 3.3 AL
ENA SM AB SER IA-ACNC: >8 AL
ENA SS-A AB SER IA-ACNC: <0.2 AL
ENA SS-B AB SER IA-ACNC: <0.2 AL
EOSINOPHIL # BLD AUTO: 0.02 K/UL
EOSINOPHIL NFR BLD AUTO: 0.4 %
GLUCOSE QUALITATIVE U: NEGATIVE
HCT VFR BLD CALC: 41.5 %
HGB BLD-MCNC: 14.2 G/DL
HYALINE CASTS: 1 /LPF
IMM GRANULOCYTES NFR BLD AUTO: 0.2 %
KETONES URINE: NEGATIVE
LEUKOCYTE ESTERASE URINE: NEGATIVE
LYMPHOCYTES # BLD AUTO: 2.05 K/UL
LYMPHOCYTES NFR BLD AUTO: 37.2 %
MAN DIFF?: NORMAL
MCHC RBC-ENTMCNC: 29.5 PG
MCHC RBC-ENTMCNC: 34.2 GM/DL
MCV RBC AUTO: 86.3 FL
MICROSCOPIC-UA: NORMAL
MONOCYTES # BLD AUTO: 0.53 K/UL
MONOCYTES NFR BLD AUTO: 9.6 %
NEUTROPHILS # BLD AUTO: 2.87 K/UL
NEUTROPHILS NFR BLD AUTO: 52.1 %
NITRITE URINE: NEGATIVE
PH URINE: 6
PLATELET # BLD AUTO: 179 K/UL
POTASSIUM SERPL-SCNC: 4.4 MMOL/L
PROT SERPL-MCNC: 7.8 G/DL
PROT UR-MCNC: 39 MG/DL
PROTEIN URINE: ABNORMAL
RBC # BLD: 4.81 M/UL
RBC # FLD: 12.5 %
RED BLOOD CELLS URINE: 1 /HPF
SODIUM SERPL-SCNC: 141 MMOL/L
SPECIFIC GRAVITY URINE: 1.02
SQUAMOUS EPITHELIAL CELLS: 4 /HPF
UROBILINOGEN URINE: NORMAL
WBC # FLD AUTO: 5.51 K/UL
WHITE BLOOD CELLS URINE: 2 /HPF

## 2022-06-20 ENCOUNTER — APPOINTMENT (OUTPATIENT)
Dept: OBGYN | Facility: CLINIC | Age: 47
End: 2022-06-20
Payer: COMMERCIAL

## 2022-06-20 VITALS
BODY MASS INDEX: 27.29 KG/M2 | HEIGHT: 63 IN | SYSTOLIC BLOOD PRESSURE: 110 MMHG | WEIGHT: 154 LBS | DIASTOLIC BLOOD PRESSURE: 60 MMHG

## 2022-06-20 DIAGNOSIS — N76.0 ACUTE VAGINITIS: ICD-10-CM

## 2022-06-20 DIAGNOSIS — Z01.411 ENCOUNTER FOR GYNECOLOGICAL EXAMINATION (GENERAL) (ROUTINE) WITH ABNORMAL FINDINGS: ICD-10-CM

## 2022-06-20 PROCEDURE — 99396 PREV VISIT EST AGE 40-64: CPT

## 2022-06-20 NOTE — HISTORY OF PRESENT ILLNESS
[FreeTextEntry1] : patient presents today for routine annual exam.\par  [postmenopausal] : postmenopausal [TextBox_4] : irritation and a light discharge  [Mammogramdate] : 8/2021 [BreastSonogramDate] : 8/2021 [PapSmeardate] : 6/2021

## 2022-06-22 ENCOUNTER — EMERGENCY (EMERGENCY)
Facility: HOSPITAL | Age: 47
LOS: 1 days | Discharge: HOME | End: 2022-06-22
Attending: EMERGENCY MEDICINE
Payer: COMMERCIAL

## 2022-06-22 VITALS
DIASTOLIC BLOOD PRESSURE: 78 MMHG | OXYGEN SATURATION: 99 % | RESPIRATION RATE: 18 BRPM | SYSTOLIC BLOOD PRESSURE: 121 MMHG | TEMPERATURE: 98 F | HEART RATE: 85 BPM | HEIGHT: 63 IN | WEIGHT: 154.98 LBS

## 2022-06-22 VITALS
OXYGEN SATURATION: 98 % | TEMPERATURE: 98 F | RESPIRATION RATE: 16 BRPM | HEART RATE: 77 BPM | SYSTOLIC BLOOD PRESSURE: 115 MMHG | DIASTOLIC BLOOD PRESSURE: 78 MMHG

## 2022-06-22 LAB
ALBUMIN SERPL ELPH-MCNC: 4.3 G/DL — SIGNIFICANT CHANGE UP (ref 3.3–5)
ALP SERPL-CCNC: 86 U/L — SIGNIFICANT CHANGE UP (ref 40–120)
ALT FLD-CCNC: 19 U/L — SIGNIFICANT CHANGE UP (ref 10–45)
ANION GAP SERPL CALC-SCNC: 12 MMOL/L — SIGNIFICANT CHANGE UP (ref 5–17)
APPEARANCE UR: CLEAR — SIGNIFICANT CHANGE UP
AST SERPL-CCNC: 41 U/L — HIGH (ref 10–40)
BACTERIA # UR AUTO: NEGATIVE — SIGNIFICANT CHANGE UP
BASOPHILS # BLD AUTO: 0.02 K/UL — SIGNIFICANT CHANGE UP (ref 0–0.2)
BASOPHILS NFR BLD AUTO: 0.4 % — SIGNIFICANT CHANGE UP (ref 0–2)
BILIRUB SERPL-MCNC: 0.4 MG/DL — SIGNIFICANT CHANGE UP (ref 0.2–1.2)
BILIRUB UR-MCNC: ABNORMAL
BUN SERPL-MCNC: 10 MG/DL — SIGNIFICANT CHANGE UP (ref 7–23)
CALCIUM SERPL-MCNC: 9.8 MG/DL — SIGNIFICANT CHANGE UP (ref 8.4–10.5)
CANDIDA VAG CYTO: NOT DETECTED
CHLORIDE SERPL-SCNC: 105 MMOL/L — SIGNIFICANT CHANGE UP (ref 96–108)
CO2 SERPL-SCNC: 22 MMOL/L — SIGNIFICANT CHANGE UP (ref 22–31)
COLOR SPEC: ABNORMAL
CREAT SERPL-MCNC: 0.69 MG/DL — SIGNIFICANT CHANGE UP (ref 0.5–1.3)
DIFF PNL FLD: NEGATIVE — SIGNIFICANT CHANGE UP
EGFR: 107 ML/MIN/1.73M2 — SIGNIFICANT CHANGE UP
EOSINOPHIL # BLD AUTO: 0.01 K/UL — SIGNIFICANT CHANGE UP (ref 0–0.5)
EOSINOPHIL NFR BLD AUTO: 0.2 % — SIGNIFICANT CHANGE UP (ref 0–6)
EPI CELLS # UR: 1 /HPF — SIGNIFICANT CHANGE UP
G VAGINALIS+PREV SP MTYP VAG QL MICRO: DETECTED
GLUCOSE SERPL-MCNC: 100 MG/DL — HIGH (ref 70–99)
GLUCOSE UR QL: NEGATIVE — SIGNIFICANT CHANGE UP
HCT VFR BLD CALC: 41.4 % — SIGNIFICANT CHANGE UP (ref 34.5–45)
HGB BLD-MCNC: 13.4 G/DL — SIGNIFICANT CHANGE UP (ref 11.5–15.5)
IMM GRANULOCYTES NFR BLD AUTO: 0.4 % — SIGNIFICANT CHANGE UP (ref 0–1.5)
KETONES UR-MCNC: NEGATIVE — SIGNIFICANT CHANGE UP
LEUKOCYTE ESTERASE UR-ACNC: NEGATIVE — SIGNIFICANT CHANGE UP
LYMPHOCYTES # BLD AUTO: 1.2 K/UL — SIGNIFICANT CHANGE UP (ref 1–3.3)
LYMPHOCYTES # BLD AUTO: 24 % — SIGNIFICANT CHANGE UP (ref 13–44)
MCHC RBC-ENTMCNC: 28.3 PG — SIGNIFICANT CHANGE UP (ref 27–34)
MCHC RBC-ENTMCNC: 32.4 GM/DL — SIGNIFICANT CHANGE UP (ref 32–36)
MCV RBC AUTO: 87.5 FL — SIGNIFICANT CHANGE UP (ref 80–100)
MONOCYTES # BLD AUTO: 0.35 K/UL — SIGNIFICANT CHANGE UP (ref 0–0.9)
MONOCYTES NFR BLD AUTO: 7 % — SIGNIFICANT CHANGE UP (ref 2–14)
NEUTROPHILS # BLD AUTO: 3.39 K/UL — SIGNIFICANT CHANGE UP (ref 1.8–7.4)
NEUTROPHILS NFR BLD AUTO: 68 % — SIGNIFICANT CHANGE UP (ref 43–77)
NITRITE UR-MCNC: POSITIVE
NRBC # BLD: 0 /100 WBCS — SIGNIFICANT CHANGE UP (ref 0–0)
PH UR: 6.5 — SIGNIFICANT CHANGE UP (ref 5–8)
PLATELET # BLD AUTO: 127 K/UL — LOW (ref 150–400)
POTASSIUM SERPL-MCNC: 4.4 MMOL/L — SIGNIFICANT CHANGE UP (ref 3.5–5.3)
POTASSIUM SERPL-SCNC: 4.4 MMOL/L — SIGNIFICANT CHANGE UP (ref 3.5–5.3)
PROT SERPL-MCNC: 8.2 G/DL — SIGNIFICANT CHANGE UP (ref 6–8.3)
PROT UR-MCNC: ABNORMAL
RBC # BLD: 4.73 M/UL — SIGNIFICANT CHANGE UP (ref 3.8–5.2)
RBC # FLD: 12.8 % — SIGNIFICANT CHANGE UP (ref 10.3–14.5)
RBC CASTS # UR COMP ASSIST: 0 /HPF — SIGNIFICANT CHANGE UP (ref 0–4)
SODIUM SERPL-SCNC: 139 MMOL/L — SIGNIFICANT CHANGE UP (ref 135–145)
SP GR SPEC: 1.01 — SIGNIFICANT CHANGE UP (ref 1.01–1.02)
T VAGINALIS VAG QL WET PREP: NOT DETECTED
UROBILINOGEN FLD QL: NEGATIVE — SIGNIFICANT CHANGE UP
WBC # BLD: 4.99 K/UL — SIGNIFICANT CHANGE UP (ref 3.8–10.5)
WBC # FLD AUTO: 4.99 K/UL — SIGNIFICANT CHANGE UP (ref 3.8–10.5)
WBC UR QL: 1 /HPF — SIGNIFICANT CHANGE UP (ref 0–5)

## 2022-06-22 PROCEDURE — 36415 COLL VENOUS BLD VENIPUNCTURE: CPT

## 2022-06-22 PROCEDURE — 99284 EMERGENCY DEPT VISIT MOD MDM: CPT | Mod: 25

## 2022-06-22 PROCEDURE — 85025 COMPLETE CBC W/AUTO DIFF WBC: CPT

## 2022-06-22 PROCEDURE — 81001 URINALYSIS AUTO W/SCOPE: CPT

## 2022-06-22 PROCEDURE — 96374 THER/PROPH/DIAG INJ IV PUSH: CPT

## 2022-06-22 PROCEDURE — 99284 EMERGENCY DEPT VISIT MOD MDM: CPT

## 2022-06-22 PROCEDURE — 87086 URINE CULTURE/COLONY COUNT: CPT

## 2022-06-22 PROCEDURE — 80053 COMPREHEN METABOLIC PANEL: CPT

## 2022-06-22 RX ORDER — SODIUM CHLORIDE 9 MG/ML
1000 INJECTION INTRAMUSCULAR; INTRAVENOUS; SUBCUTANEOUS ONCE
Refills: 0 | Status: COMPLETED | OUTPATIENT
Start: 2022-06-22 | End: 2022-06-22

## 2022-06-22 RX ORDER — KETOROLAC TROMETHAMINE 30 MG/ML
15 SYRINGE (ML) INJECTION ONCE
Refills: 0 | Status: DISCONTINUED | OUTPATIENT
Start: 2022-06-22 | End: 2022-06-22

## 2022-06-22 RX ORDER — CEFPODOXIME PROXETIL 100 MG
1 TABLET ORAL
Qty: 20 | Refills: 0
Start: 2022-06-22 | End: 2022-07-01

## 2022-06-22 RX ORDER — CEFTRIAXONE 500 MG/1
1000 INJECTION, POWDER, FOR SOLUTION INTRAMUSCULAR; INTRAVENOUS ONCE
Refills: 0 | Status: COMPLETED | OUTPATIENT
Start: 2022-06-22 | End: 2022-06-22

## 2022-06-22 RX ORDER — ONDANSETRON 8 MG/1
4 TABLET, FILM COATED ORAL ONCE
Refills: 0 | Status: COMPLETED | OUTPATIENT
Start: 2022-06-22 | End: 2022-06-22

## 2022-06-22 RX ADMIN — SODIUM CHLORIDE 1000 MILLILITER(S): 9 INJECTION INTRAMUSCULAR; INTRAVENOUS; SUBCUTANEOUS at 13:27

## 2022-06-22 RX ADMIN — CEFTRIAXONE 100 MILLIGRAM(S): 500 INJECTION, POWDER, FOR SOLUTION INTRAMUSCULAR; INTRAVENOUS at 16:07

## 2022-06-22 NOTE — ED ADULT NURSE NOTE - NSFALLRSKUNASSIST_ED_ALL_ED
[FreeTextEntry1] : 57-yo F w/ PMH of HTN, gallstone pancreatitis s/p cholecystectomy, presenting for blood work.\par \par #Thalassemia\par - Born in the [facility]. Son is told to have "blood disease carrier state". MCV <70 from last CBC. Suspected thalassemia trait.\par - Check CBC and hemoglobin electrophoresis today.\par \par #Palpitation\par - Episodic palpitations without symptoms significant for hypothyroidism. Thyroid physical exam WNL. Postmenopause.\par - Check TSH today\par \par #Vitamin D deficiency\par - Check Vitamin D level today\par - Will prescribe supplement if Vit D low\par \par #HCM\par - Flu shot given last year\par - Mammogram deferred to later this year\par - Pap smear next visit\par - CBC, CMP, A1C, TSH, lipid panel, vitamin D level, HCV. Patient requests results to be printed out to her.\par - RTC 5 weeks for Pap smear\par \par Discussed with Dr. Gentile
no
Yes

## 2022-06-22 NOTE — ED PROVIDER NOTE - ATTENDING APP SHARED VISIT CONTRIBUTION OF CARE
please refer to MDM  urine appears grossly pos. given IV cetriaxone w plan for oral meds as well as flagyl givenBV diagnosis. instructed to follow w her gyn.

## 2022-06-22 NOTE — ED PROVIDER NOTE - PATIENT PORTAL LINK FT
You can access the FollowMyHealth Patient Portal offered by [facility] by registering at the following website: [email or web address]. By joining [facility] [facility]’s FollowMyHealth portal, you will also be able to view your health information using other applications (apps) compatible with our system.

## 2022-06-22 NOTE — ED PROVIDER NOTE - NS ED ATTENDING STATEMENT MOD
This was a shared visit with the LYNDA. I reviewed and verified the documentation and independently performed the documented:

## 2022-06-22 NOTE — ED PROVIDER NOTE - NSICDXFAMILYHX_GEN_ALL_CORE_FT
FAMILY HISTORY:  Grandparent  Still living? Unknown  Family history of systemic lupus erythematosus, Age at diagnosis: Age Unknown

## 2022-06-22 NOTE — ED ADULT NURSE NOTE - OBJECTIVE STATEMENT
46y/o Female presents to ED from home with c/o flank pain. Pt states bladder spasms, increased urination and urgency x 2 days. Endorses malaise, chills, feeling of "pressure in bladder". PMH Lupus, hysterectomy 2017. Pt states seen by GYN this week for vaginal discharge and +BV. Denies fever, V/D, abd pain, chest pain, SOB, dizziness, lightheadedness, numbness, tingling, dysuria, hematuria. A&Ox3, airway patent with spontaneous unlabored breathing, equal b/l radial pulses. Skin color normal for race. Abd soft non distended, non tender to palpation.

## 2022-06-22 NOTE — ED PROVIDER NOTE - PROGRESS NOTE DETAILS
spoke to patient about results of the UA, started on Cefpodoxime. given a dose of ceftriaxone in the ER. strict return precautions advised. Pt advised to complete the dose of flagyl from GYN.

## 2022-06-22 NOTE — ED PROVIDER NOTE - OBJECTIVE STATEMENT
47yof pmhx of Lupus on Plaquenil and Cellcept here with 2 days of bladder spasms with urgency and freq and today with malaise, nausea and chills with lower back pain. pt reports not feeling well. hysterectomy 2017 then had UTI. sexually active after years 2 weeks ago, seen by GYN this week for vaginal discharge and called in today for +BV and Flagyl sent to the pharmacy- not filled yet. no fever.

## 2022-06-22 NOTE — ED PROVIDER NOTE - NS ED ROS FT
Constitutional: No fever +chills  Eyes: No visual changes, eye pain or redness  HEENT: No throat pain, ear pain, nasal pain. No nose bleeding.  CV: No chest pain or lower extremity edema  Resp: No SOB no cough  GI: +abd pain. +nausea -vomiting. No diarrhea. No constipation.   : No dysuria, hematuria. +urgency +freq  MSK: No musculoskeletal pain  Skin: No rash  Neuro: No headache. No numbness or tingling. No weakness.

## 2022-06-22 NOTE — ED PROVIDER NOTE - NSFOLLOWUPINSTRUCTIONS_ED_ALL_ED_FT
1. Follow up with your PMD within 48-72 hours.   2. Take Cefpodoxime 100 mg 1 tab 2x/day for 10 days.  Increase fluids. Motrin 600mg every 8 hours with food for pain. You still have to complete your other antibiotics from your GYN.   3. Worsening pain, new fever, chills, nausea, vomiting return to ER

## 2022-06-22 NOTE — ED PROVIDER NOTE - CLINICAL SUMMARY MEDICAL DECISION MAKING FREE TEXT BOX
47yr F hx of lupus on medications, recent dx of BV however didn't take meds for it yet p/w dyrusia frequency and malaise. had nausea no vomiting. no fever chills. no cp. sob. no headache.   exam notable for well appaering, neuro grossly intact, clear lungs, S1-2, soft non tender abd, no CVAT. no mid line ttp. well perfused no peripheral edema.  concern for UTI vs pyelo vs less likely stone. will do labs, UA and if need be treated.

## 2022-06-23 LAB
CULTURE RESULTS: NO GROWTH — SIGNIFICANT CHANGE UP
SPECIMEN SOURCE: SIGNIFICANT CHANGE UP

## 2022-06-24 ENCOUNTER — APPOINTMENT (OUTPATIENT)
Dept: OPHTHALMOLOGY | Facility: CLINIC | Age: 47
End: 2022-06-24
Payer: COMMERCIAL

## 2022-06-24 ENCOUNTER — NON-APPOINTMENT (OUTPATIENT)
Age: 47
End: 2022-06-24

## 2022-06-24 PROCEDURE — 92012 INTRM OPH EXAM EST PATIENT: CPT

## 2022-06-24 PROCEDURE — 92083 EXTENDED VISUAL FIELD XM: CPT

## 2022-06-27 LAB — CYTOLOGY CVX/VAG DOC THIN PREP: ABNORMAL

## 2022-07-01 ENCOUNTER — NON-APPOINTMENT (OUTPATIENT)
Age: 47
End: 2022-07-01

## 2022-07-02 ENCOUNTER — EMERGENCY (EMERGENCY)
Facility: HOSPITAL | Age: 47
LOS: 1 days | Discharge: HOME | End: 2022-07-02
Attending: EMERGENCY MEDICINE | Admitting: EMERGENCY MEDICINE

## 2022-07-02 VITALS
DIASTOLIC BLOOD PRESSURE: 77 MMHG | RESPIRATION RATE: 16 BRPM | HEIGHT: 63 IN | TEMPERATURE: 98 F | SYSTOLIC BLOOD PRESSURE: 143 MMHG | OXYGEN SATURATION: 100 % | HEART RATE: 99 BPM

## 2022-07-02 PROCEDURE — 99285 EMERGENCY DEPT VISIT HI MDM: CPT

## 2022-07-02 NOTE — ED PROVIDER NOTE - NSFOLLOWUPCLINICS_GEN_ALL_ED_FT
[facility]  Neurology  [address] [place] [address] - [address] [place] [address]  Phone: [phone]  Fax:     [facility]  Gastroenterology  [address] [place] [id number]  Phone: [phone]  Fax:

## 2022-07-02 NOTE — ED PROVIDER NOTE - CLINICAL SUMMARY MEDICAL DECISION MAKING FREE TEXT BOX
Keny Sharp MD (PGY-3): The patient is a 47y Female with pmhx of SLE coming in with R sided HA for the last few months and chest discomfort for the last week. Low suspicion for intracranial mass or bleeding. Low suspicion for ACS or PE. CP could be attributed to GERD. ekg, cxr, labs, meds. Will reassess. Most likely DC home with neuro and GI f/u.

## 2022-07-02 NOTE — ED PROVIDER NOTE - ATTENDING CONTRIBUTION TO CARE
I performed a face-to-face evaluation of the patient and performed a history and physical examination along with the resident or ACP, and/or medical student above.  I agree with the history and physical examination as documented by the resident or ACP, and/or medical student above.  Rodrigues:  46yo F w/ pmh SLE on plaquenil and cellcept, chronic headaches (normal MRI brain a yr ago), p/w MMC including R sided headache/tingling sensation without blurred vision or focal neuro symptoms, not on AC; throat and chest sensation of fullness (not painful, and does not feel like prior GERD), denies cp, sob, palpitations, hx of dvt/pe, LE edema or pain, long drives. Low suspicion for PE or ACS, will dimer and trop. Headache cocktail, no indication for imaging, and likely outpt neuro/GI/PMD f/u.

## 2022-07-02 NOTE — ED ADULT TRIAGE NOTE - CODE STROKE ACTIVE YN
[facility]  eMERGENCY dEPARTMENT eNCOUnter          CHIEF COMPLAINT       Chief Complaint   Patient presents with    Shortness of Breath       Nurses Notes reviewed and I agree except as noted in the HPI. HISTORY OF PRESENT ILLNESS    Mauro Santos is a 38 y.o. male who presents to the Emergency Department for the evaluation of shortness of breath. Patient reports a history of hypertension, hyperlipidemia, narcolepsy, CHF, CVA, GERD, kidney transplant, hernia surgery in June 2018, and CKD for which he receives dialysis on Mondays, Wednesdays, and Fridays. He states that he has been experiencing shortness of breath for the past month as well as a cough which is worse after eating and at night. He states that he has also been experiencing pleuritic chest pain, a decreased appetite and intake, and rhinorrhea. Patient reports that his nephrologist is Dr. Jayro Rogel, and that he typically receives dialysis for 3.75 hours. He denies experiencing any nausea, emesis, sneezing, dizziness, diaphoresis, leg swelling, or anxiety. He also denies that he smokes or that he snores at night. He states that he went on an 8 hour car trip to [place] in July 2018. Patient denies further complaints at initial encounter. The HPI was provided by the patient. REVIEW OF SYSTEMS     Review of Systems   Constitutional: Positive for appetite change (decreased). Negative for chills, diaphoresis, fatigue and fever. HENT: Positive for rhinorrhea. Negative for congestion and sore throat. Respiratory: Positive for cough and shortness of breath. Negative for chest tightness and wheezing. Cardiovascular: Positive for chest pain (pleuritic). Negative for palpitations and leg swelling. Gastrointestinal: Negative for abdominal pain, blood in stool, constipation, diarrhea, nausea and vomiting. Genitourinary: Negative for difficulty urinating, dysuria and hematuria.    Musculoskeletal: Negative for back pain, joint swelling, neck pain and neck stiffness. Skin: Negative for pallor and rash. Neurological: Negative for dizziness, syncope, weakness, light-headedness, numbness and headaches. Hematological: Negative for adenopathy. Psychiatric/Behavioral: Negative for confusion and suicidal ideas. The patient is not nervous/anxious. PAST MEDICAL HISTORY    has a past medical history of Anemia; Angiotensin converting enzyme inhibitor-aggravated angioedema; Cerebral artery occlusion with cerebral infarction [facility]); CHF (congestive heart failure) ([facility] Disease of blood and blood forming organ; DM type 1 (diabetes mellitus, type 1) ([facility] ESRD (end stage renal disease) ([facility] GERD (gastroesophageal reflux disease); H/O pancreas transplant ([facility] Hemodialysis patient [facility]); History of blood transfusion; HTN (hypertension); Hyperlipidemia; Hyperphosphatemia; Hypersomnolence disorder; Kidney transplant status, living unrelated donor; Leaky heart valve; Metabolic acidosis; Narcolepsy; Nephropathy, diabetic ([facility] Secondary hyperparathyroidism of renal origin ([facility] Seizures ([facility] and Vitamin D deficiency. SURGICAL HISTORY      has a past surgical history that includes Pancreas surgery (2007); eye surgery; Kidney transplant (2006); other surgical history (04/15/2005); other surgical history (06/07/2005); other surgical history (06/23/2005); other surgical history (10/03/2005); and pr repair incisional hernia,reducible (N/A, 6/29/2018).     CURRENT MEDICATIONS       Previous Medications    ASPIRIN 81 MG CHEWABLE TABLET    Take 81 mg by mouth daily    B COMPLEX-C-FOLIC ACID (VOL-CARE RX) 1 MG TABS    Take by mouth 3 times daily    B-D ULTRAFINE III SHORT PEN 31G X 8 MM MISC    Seven or eight    CALCITRIOL (ROCALTROL) 0.5 MCG CAPSULE    Take 0.5 mcg by mouth three times a week     CALCIUM ACETATE (ELIPHOS) 667 MG TABS    Take 1 tablet by mouth 3 times daily (with meals)    CARVEDILOL (COREG) 25 MG TABLET    Take 2 tablets by hydralazine; lisinopril; losartan; amlodipine; morphine; and lipitor [atorvastatin]. FAMILY HISTORY     indicated that his mother is alive. He indicated that his father is alive. He indicated that the status of his sister is unknown. He indicated that the status of his maternal grandmother is unknown. He indicated that the status of his maternal aunt is unknown.    family history includes Diabetes in his maternal aunt, mother, and sister; Heart Disease in his father and mother; High Blood Pressure in his maternal grandmother and mother; Kidney Disease in his maternal aunt. SOCIAL HISTORY      reports that he has never smoked. He has never used smokeless tobacco. He reports that he does not drink alcohol or use drugs. PHYSICAL EXAM     INITIAL VITALS:  height is 5' 9\" (1.753 m) and weight is 177 lb 7.5 oz (80.5 kg). His oral temperature is 97.9 °F (36.6 °C). His blood pressure is 136/95 (abnormal) and his pulse is 98. His respiration is 22 and oxygen saturation is 100%. Physical Exam   Constitutional: He is oriented to person, place, and time. He appears well-developed and well-nourished. He is active and cooperative. Non-toxic appearance. HENT:   Head: Normocephalic and atraumatic. Right Ear: External ear normal.   Left Ear: External ear normal.   Mouth/Throat: Oropharynx is clear and moist and mucous membranes are normal. Mucous membranes are not pale and not dry. No oropharyngeal exudate, posterior oropharyngeal edema or posterior oropharyngeal erythema. Cobblestoning noted in the patient's posterior oropharynx. Patient's tongue is large with a Mallampati of 4. Eyes: Conjunctivae, EOM and lids are normal. Right eye exhibits no exudate. Left eye exhibits no exudate. No scleral icterus. Neck: Normal range of motion. Neck supple. No tracheal deviation present.    JVP noted   Cardiovascular: Normal rate, regular rhythm, S1 normal, S2 normal, normal heart sounds, intact distal pulses and normal pulses. Exam reveals no gallop. No murmur heard. Pulses:       Radial pulses are 2+ on the right side, and 2+ on the left side. Pulmonary/Chest: Effort normal and breath sounds normal. No accessory muscle usage or stridor. No respiratory distress. He has no decreased breath sounds. He has no wheezes. He has no rhonchi. He has no rales. He exhibits tenderness (reproducible anterior chest wall tenderness). Dialysis catheter noted   Abdominal: Soft. Normal appearance and bowel sounds are normal. He exhibits no distension. There is no tenderness. There is no rigidity, no rebound and no guarding. Musculoskeletal: Normal range of motion. Neurological: He is alert and oriented to person, place, and time. He has normal strength. He displays no atrophy and no tremor. No sensory deficit. GCS eye subscore is 4. GCS verbal subscore is 5. GCS motor subscore is 6. Skin: Skin is warm and dry. No rash noted. Psychiatric: He has a normal mood and affect. His speech is normal and behavior is normal.   Nursing note and vitals reviewed. DIAGNOSTIC RESULTS     EKG: All EKG's are interpreted by the Emergency Department Physician who either signs or Co-signs this chart in the absence of a cardiologist.  EKG interpreted by Jayla Anne MD:    Vent. Rate: 81 bpm  SD interval: 148 ms  QRS duration: 72 ms  QTc: 485 ms  P-R-T axes: 34, 77, -63  Normal sinus rhythm  ST & T wave abnormality, consider inferior ischemia  No STEMI    RADIOLOGY: non-plain film images(s) such as CT, Ultrasound and MRI are read by the radiologist.    XR CHEST STANDARD (2 VW)   Final Result   Left retrocardiac opacities which may represent atelectasis or infiltrate. **This report has been created using voice recognition software. It may contain minor errors which are inherent in voice recognition technology. **      Final report electronically signed by Dr Markus Gill on 8/17/2018 12:05 PM          LABS:   Labs Reviewed   CBC WITH AUTO DIFFERENTIAL - Abnormal; Notable for the following:        Result Value    RBC 3.93 (*)     Hemoglobin 9.8 (*)     Hematocrit 32.3 (*)     MCH 24.9 (*)     MCHC 30.3 (*)     RDW-CV 17.2 (*)     RDW-SD 50.6 (*)     Eosinophils # 0.6 (*)     All other components within normal limits   COMPREHENSIVE METABOLIC PANEL - Abnormal; Notable for the following:     Glucose 60 (*)     CREATININE 11.2 (*)     BUN 82 (*)     Potassium 6.5 (*)     Chloride 94 (*)     Alkaline Phosphatase 274 (*)     All other components within normal limits   TROPONIN - Abnormal; Notable for the following:     Troponin T 0.178 (*)     All other components within normal limits   BRAIN NATRIURETIC PEPTIDE - Abnormal; Notable for the following:     Pro-BNP > 56594.0 (*)     All other components within normal limits   GLOMERULAR FILTRATION RATE, ESTIMATED - Abnormal; Notable for the following:     Est, Glom Filt Rate 6 (*)     All other components within normal limits   POTASSIUM - Abnormal; Notable for the following:     Potassium 6.8 (*)     All other components within normal limits   PROCALCITONIN - Abnormal; Notable for the following:     Procalcitonin 2.50 (*)     All other components within normal limits   TROPONIN - Abnormal; Notable for the following:     Troponin T 0.167 (*)     All other components within normal limits   ANION GAP   OSMOLALITY       EMERGENCY DEPARTMENT COURSE:   Vitals:    Vitals:    08/17/18 1110 08/17/18 1227 08/17/18 1405   BP: (!) 125/102 (!) 127/102 (!) 136/95   Pulse: 82 81 98   Resp: 15 22    Temp: 97.9 °F (36.6 °C)     TempSrc: Oral     SpO2: 100% 100%    Weight: 176 lb (79.8 kg)  177 lb 7.5 oz (80.5 kg)   Height: 5' 9\" (1.753 m)         11:29 AM: The patient was seen and evaluated. 12:56 PM: Dr. Katherine Parrish (Nephrology) was consulted about the patient's condition. 1:24 PM: Dr. Hermila Verdugo [facility]) was consulted about the patient's condition. He graciously agreed to admit the patient.     MDM:  Patient's potassium was high, repeat potassium was ordered and recommendation of Dr. Poppy Luna. Patient has some retrocardiac infiltrate. Patient will be admitted by Dr. Dora Ambrosio for further workup and management. Patient's troponin is also more elevated than his normal levels. Patient is going to have emergent dialysis done after admission. All of patient's lab the testing that reviewed discussed with the attending and also with the patient. CRITICAL CARE:   None     CONSULTS:  Dr. Poppy Luna (Nephrology)  Dr. Abdoul Bean (Hospitalist)    PROCEDURES:  None     FINAL IMPRESSION      1. Shortness of breath    2. Hyperkalemia          DISPOSITION/PLAN   Admit    PATIENT REFERRED TO:  Margarita Clay DO  [facility]   [address] [facility]  [phone]            DISCHARGE MEDICATIONS:  New Prescriptions    No medications on file       (Please note that portions of this note were completed with a voice recognition program.  Efforts were made to edit the dictations but occasionally words are mis-transcribed.)    Scribe:  Eduarda Swann 8/17/18 11:29 AM Scribing for and in the presence of Favian Hung MD.    Signed by: Milton Gale 08/17/18 4:06 PM    Provider:  I personally performed the services described in the documentation, reviewed and edited the documentation which was dictated to the scribe in my presence, and it accurately records my words and actions.     Favian Hung MD 8/17/18 4:06 PM       Favian Hung MD  08/17/18 1406 Medical MD Chemo  08/17/18 4082 No

## 2022-07-02 NOTE — ED PROVIDER NOTE - NS ED ROS FT
GENERAL: No fever or chills  EYES: No change in vision  HEENT: No trouble swallowing or speaking  CARDIAC: +chest pain  PULMONARY: No cough or SOB  GI: +diarrhea  : No changes in urination  SKIN: No rashes  NEURO: +headache  MSK: No joint pain  Otherwise as HPI or negative.

## 2022-07-02 NOTE — ED PROVIDER NOTE - PROGRESS NOTE DETAILS
Keny Sharp MD (PGY-3): Pt's HA and CP have mildly improved after getting meds. Other labs and imaging wnl. Pt will be discharged home with neuro and GI followup.

## 2022-07-02 NOTE — ED PROVIDER NOTE - NSFOLLOWUPINSTRUCTIONS_ED_ALL_ED_FT
Please followup with a GI doctor and neurologist for your symptoms. I have attached their contact information below. Please call to schedule an appointment.    Please  Pepcid from any pharmacy over the counter and take a 20mg tablet once a day for your chest fullness until you see the GI doctor.    Headache    A headache is pain or discomfort felt around the head or neck area. The specific cause of a headache may not be found as there are many types including tension headaches, migraine headaches, and cluster headaches. Watch your condition for any changes. Things you can do to manage your pain include taking over the counter and prescription medications as instructed by your health care provider, lying down in a dark quiet room, limiting stress, getting regular sleep, and refraining from alcohol and tobacco products.    SEEK IMMEDIATE MEDICAL CARE IF YOU HAVE ANY OF THE FOLLOWING SYMPTOMS: fever, vomiting, stiff neck, loss of vision, problems with speech, muscle weakness, loss of balance, trouble walking, passing out, or confusion.    Chest Pain    Chest pain can be caused by many different conditions which may or may not be dangerous. Causes include heartburn, lung infections, heart attack, blood clot in lungs, skin infections, strain or damage to muscle, cartilage, or bones, etc. In addition to a history and physical examination, an electrocardiogram (ECG) or other lab tests may have been performed to determine the cause of your chest pain. Follow up with your primary care provider or with a cardiologist as instructed.     SEEK IMMEDIATE MEDICAL CARE IF YOU HAVE ANY OF THE FOLLOWING SYMPTOMS: worsening chest pain, coughing up blood, unexplained back/neck/jaw pain, severe abdominal pain, dizziness or lightheadedness, fainting, shortness of breath, sweaty or clammy skin, vomiting, or racing heart beat. These symptoms may represent a serious problem that is an emergency. Do not wait to see if the symptoms will go away. Get medical help right away. Call 911 and do not drive yourself to the hospital.

## 2022-07-02 NOTE — ED ADULT TRIAGE NOTE - CHIEF COMPLAINT QUOTE
Pt arrives to ED reporting a strange feeling to the right side of her head for a few months and "not feeling herself." Pt has felt "vertigo" for a few months (not sudden loss of balance).  Pt has history of episodic tension headaches but says it is not pain.  Feels bilateral ear itching.  Last week had a uti and started on Abx and started to have diarrhea.  Pt feels like her sinuses are clogged and her chest is congested.  Pt is [place] negative in triage.

## 2022-07-02 NOTE — ED PROVIDER NOTE - OBJECTIVE STATEMENT
The patient is a 47y Female with pmhx of SLE coming in with R sided HA for the last few months and chest discomfort for the last week. Pt had MRI head done last year which was unremarkable. Pt was diagnosed with tension HAs. The patient is a 47y Female with pmhx of SLE coming in with R sided HA for the last few months and chest discomfort for the last week. Pt had MRI head done last year which was unremarkable. Pt was diagnosed with tension HA. Describes R sided HA as tingling, electricity-like sensation. Pt has mid-sternal CP, not pleuritic, not exertional. Pt feels sensation of something stuck in her throat, but denies difficulty swallowing. Had some episodes of nausea a few months ago, has since subsided. Had UTI recently, finished abx course earlier this week, said she got diarrhea. Denies fevers, sob, abd pain, dysuria, leg swelling. No hx of cardiac disease or dvt/pe. Allergic to Lamisil.

## 2022-07-03 VITALS
TEMPERATURE: 98 F | RESPIRATION RATE: 18 BRPM | OXYGEN SATURATION: 100 % | SYSTOLIC BLOOD PRESSURE: 123 MMHG | HEART RATE: 92 BPM | DIASTOLIC BLOOD PRESSURE: 70 MMHG

## 2022-07-03 LAB
ALBUMIN SERPL ELPH-MCNC: 4.1 G/DL — SIGNIFICANT CHANGE UP (ref 3.3–5)
ALP SERPL-CCNC: 95 U/L — SIGNIFICANT CHANGE UP (ref 40–120)
ALT FLD-CCNC: 23 U/L — SIGNIFICANT CHANGE UP (ref 4–33)
ANION GAP SERPL CALC-SCNC: 14 MMOL/L — SIGNIFICANT CHANGE UP (ref 7–14)
APPEARANCE UR: CLEAR — SIGNIFICANT CHANGE UP
AST SERPL-CCNC: 37 U/L — HIGH (ref 4–32)
B PERT DNA SPEC QL NAA+PROBE: SIGNIFICANT CHANGE UP
B PERT+PARAPERT DNA PNL SPEC NAA+PROBE: SIGNIFICANT CHANGE UP
BACTERIA # UR AUTO: ABNORMAL
BASOPHILS # BLD AUTO: 0.03 K/UL — SIGNIFICANT CHANGE UP (ref 0–0.2)
BASOPHILS NFR BLD AUTO: 0.5 % — SIGNIFICANT CHANGE UP (ref 0–2)
BILIRUB SERPL-MCNC: 0.2 MG/DL — SIGNIFICANT CHANGE UP (ref 0.2–1.2)
BILIRUB UR-MCNC: NEGATIVE — SIGNIFICANT CHANGE UP
BORDETELLA PARAPERTUSSIS (RAPRVP): SIGNIFICANT CHANGE UP
BUN SERPL-MCNC: 10 MG/DL — SIGNIFICANT CHANGE UP (ref 7–23)
C PNEUM DNA SPEC QL NAA+PROBE: SIGNIFICANT CHANGE UP
CALCIUM SERPL-MCNC: 9.8 MG/DL — SIGNIFICANT CHANGE UP (ref 8.4–10.5)
CHLORIDE SERPL-SCNC: 102 MMOL/L — SIGNIFICANT CHANGE UP (ref 98–107)
CO2 SERPL-SCNC: 20 MMOL/L — LOW (ref 22–31)
COLOR SPEC: ABNORMAL
CREAT SERPL-MCNC: 0.66 MG/DL — SIGNIFICANT CHANGE UP (ref 0.5–1.3)
D DIMER BLD IA.RAPID-MCNC: 160 NG/ML DDU — SIGNIFICANT CHANGE UP
DIFF PNL FLD: NEGATIVE — SIGNIFICANT CHANGE UP
EGFR: 108 ML/MIN/1.73M2 — SIGNIFICANT CHANGE UP
EOSINOPHIL # BLD AUTO: 0.01 K/UL — SIGNIFICANT CHANGE UP (ref 0–0.5)
EOSINOPHIL NFR BLD AUTO: 0.2 % — SIGNIFICANT CHANGE UP (ref 0–6)
EPI CELLS # UR: 6 /HPF — HIGH (ref 0–5)
FLUAV SUBTYP SPEC NAA+PROBE: SIGNIFICANT CHANGE UP
FLUBV RNA SPEC QL NAA+PROBE: SIGNIFICANT CHANGE UP
GLUCOSE SERPL-MCNC: 101 MG/DL — HIGH (ref 70–99)
GLUCOSE UR QL: NEGATIVE — SIGNIFICANT CHANGE UP
HADV DNA SPEC QL NAA+PROBE: SIGNIFICANT CHANGE UP
HCG SERPL-ACNC: <5 MIU/ML — SIGNIFICANT CHANGE UP
HCOV 229E RNA SPEC QL NAA+PROBE: SIGNIFICANT CHANGE UP
HCOV HKU1 RNA SPEC QL NAA+PROBE: SIGNIFICANT CHANGE UP
HCOV NL63 RNA SPEC QL NAA+PROBE: SIGNIFICANT CHANGE UP
HCOV OC43 RNA SPEC QL NAA+PROBE: SIGNIFICANT CHANGE UP
HCT VFR BLD CALC: 39 % — SIGNIFICANT CHANGE UP (ref 34.5–45)
HGB BLD-MCNC: 13.1 G/DL — SIGNIFICANT CHANGE UP (ref 11.5–15.5)
HMPV RNA SPEC QL NAA+PROBE: SIGNIFICANT CHANGE UP
HPIV1 RNA SPEC QL NAA+PROBE: SIGNIFICANT CHANGE UP
HPIV2 RNA SPEC QL NAA+PROBE: SIGNIFICANT CHANGE UP
HPIV3 RNA SPEC QL NAA+PROBE: SIGNIFICANT CHANGE UP
HPIV4 RNA SPEC QL NAA+PROBE: SIGNIFICANT CHANGE UP
HYALINE CASTS # UR AUTO: 0 /LPF — SIGNIFICANT CHANGE UP (ref 0–7)
IANC: 3.75 K/UL — SIGNIFICANT CHANGE UP (ref 1.8–7.4)
IMM GRANULOCYTES NFR BLD AUTO: 0.3 % — SIGNIFICANT CHANGE UP (ref 0–1.5)
KETONES UR-MCNC: NEGATIVE — SIGNIFICANT CHANGE UP
LEUKOCYTE ESTERASE UR-ACNC: NEGATIVE — SIGNIFICANT CHANGE UP
LIDOCAIN IGE QN: 26 U/L — SIGNIFICANT CHANGE UP (ref 7–60)
LYMPHOCYTES # BLD AUTO: 2.02 K/UL — SIGNIFICANT CHANGE UP (ref 1–3.3)
LYMPHOCYTES # BLD AUTO: 31.5 % — SIGNIFICANT CHANGE UP (ref 13–44)
M PNEUMO DNA SPEC QL NAA+PROBE: SIGNIFICANT CHANGE UP
MAGNESIUM SERPL-MCNC: 1.8 MG/DL — SIGNIFICANT CHANGE UP (ref 1.6–2.6)
MCHC RBC-ENTMCNC: 29.1 PG — SIGNIFICANT CHANGE UP (ref 27–34)
MCHC RBC-ENTMCNC: 33.6 GM/DL — SIGNIFICANT CHANGE UP (ref 32–36)
MCV RBC AUTO: 86.7 FL — SIGNIFICANT CHANGE UP (ref 80–100)
MONOCYTES # BLD AUTO: 0.59 K/UL — SIGNIFICANT CHANGE UP (ref 0–0.9)
MONOCYTES NFR BLD AUTO: 9.2 % — SIGNIFICANT CHANGE UP (ref 2–14)
NEUTROPHILS # BLD AUTO: 3.75 K/UL — SIGNIFICANT CHANGE UP (ref 1.8–7.4)
NEUTROPHILS NFR BLD AUTO: 58.3 % — SIGNIFICANT CHANGE UP (ref 43–77)
NITRITE UR-MCNC: NEGATIVE — SIGNIFICANT CHANGE UP
NRBC # BLD: 0 /100 WBCS — SIGNIFICANT CHANGE UP
NRBC # FLD: 0 K/UL — SIGNIFICANT CHANGE UP
NT-PROBNP SERPL-SCNC: 31 PG/ML — SIGNIFICANT CHANGE UP
PH UR: 6.5 — SIGNIFICANT CHANGE UP (ref 5–8)
PHOSPHATE SERPL-MCNC: 3.8 MG/DL — SIGNIFICANT CHANGE UP (ref 2.5–4.5)
PLATELET # BLD AUTO: 181 K/UL — SIGNIFICANT CHANGE UP (ref 150–400)
POTASSIUM SERPL-MCNC: 4.2 MMOL/L — SIGNIFICANT CHANGE UP (ref 3.5–5.3)
POTASSIUM SERPL-SCNC: 4.2 MMOL/L — SIGNIFICANT CHANGE UP (ref 3.5–5.3)
PROT SERPL-MCNC: 7.8 G/DL — SIGNIFICANT CHANGE UP (ref 6–8.3)
PROT UR-MCNC: ABNORMAL
RAPID RVP RESULT: SIGNIFICANT CHANGE UP
RBC # BLD: 4.5 M/UL — SIGNIFICANT CHANGE UP (ref 3.8–5.2)
RBC # FLD: 13.1 % — SIGNIFICANT CHANGE UP (ref 10.3–14.5)
RBC CASTS # UR COMP ASSIST: 1 /HPF — SIGNIFICANT CHANGE UP (ref 0–4)
RSV RNA SPEC QL NAA+PROBE: SIGNIFICANT CHANGE UP
RV+EV RNA SPEC QL NAA+PROBE: SIGNIFICANT CHANGE UP
SARS-COV-2 RNA SPEC QL NAA+PROBE: SIGNIFICANT CHANGE UP
SODIUM SERPL-SCNC: 136 MMOL/L — SIGNIFICANT CHANGE UP (ref 135–145)
SP GR SPEC: 1.02 — SIGNIFICANT CHANGE UP (ref 1–1.05)
TROPONIN T, HIGH SENSITIVITY RESULT: <6 NG/L — SIGNIFICANT CHANGE UP
UROBILINOGEN FLD QL: SIGNIFICANT CHANGE UP
WBC # BLD: 6.42 K/UL — SIGNIFICANT CHANGE UP (ref 3.8–10.5)
WBC # FLD AUTO: 6.42 K/UL — SIGNIFICANT CHANGE UP (ref 3.8–10.5)
WBC UR QL: 1 /HPF — SIGNIFICANT CHANGE UP (ref 0–5)

## 2022-07-03 PROCEDURE — 71045 X-RAY EXAM CHEST 1 VIEW: CPT | Mod: 26

## 2022-07-03 RX ORDER — SODIUM CHLORIDE 9 MG/ML
1000 INJECTION INTRAMUSCULAR; INTRAVENOUS; SUBCUTANEOUS ONCE
Refills: 0 | Status: COMPLETED | OUTPATIENT
Start: 2022-07-03 | End: 2022-07-03

## 2022-07-03 RX ORDER — ACETAMINOPHEN 500 MG
975 TABLET ORAL ONCE
Refills: 0 | Status: COMPLETED | OUTPATIENT
Start: 2022-07-03 | End: 2022-07-03

## 2022-07-03 RX ORDER — METOCLOPRAMIDE HCL 10 MG
10 TABLET ORAL ONCE
Refills: 0 | Status: COMPLETED | OUTPATIENT
Start: 2022-07-03 | End: 2022-07-03

## 2022-07-03 RX ORDER — FAMOTIDINE 10 MG/ML
20 INJECTION INTRAVENOUS ONCE
Refills: 0 | Status: COMPLETED | OUTPATIENT
Start: 2022-07-03 | End: 2022-07-03

## 2022-07-03 RX ADMIN — Medication 30 MILLILITER(S): at 00:58

## 2022-07-03 RX ADMIN — Medication 10 MILLIGRAM(S): at 00:51

## 2022-07-03 RX ADMIN — SODIUM CHLORIDE 1000 MILLILITER(S): 9 INJECTION INTRAMUSCULAR; INTRAVENOUS; SUBCUTANEOUS at 00:51

## 2022-07-03 RX ADMIN — FAMOTIDINE 20 MILLIGRAM(S): 10 INJECTION INTRAVENOUS at 00:57

## 2022-07-03 RX ADMIN — Medication 975 MILLIGRAM(S): at 00:50

## 2022-07-03 NOTE — ED ADULT NURSE NOTE - OBJECTIVE STATEMENT
46yo female received in trauma room B. pt A&Ox4, hx of lupus and chronic headache, c/o uncomfortable sensation to right sided of head x few months with epigastric discomfort. "it feels like a wave", denies pain. No neuro deficit noted. 20G IV placed in RAC, lab drawn and sent. MD beck in progress. Side rails up, bed at lowest position, call bell within reach, patient oriented to the unit, safety maintained. medicated as per order.

## 2022-07-03 NOTE — ED ADULT NURSE NOTE - CHIEF COMPLAINT QUOTE
Pt arrives to ED reporting a strange feeling to the right side of her head for a few months and "not feeling herself." Pt has felt "vertigo" for a few months (not sudden loss of balance).  Pt has history of episodic tension headaches but says it is not pain.  Feels bilateral ear itching.  Last week had a uti and started on Abx and started to have diarrhea.  Pt feels like her sinuses are clogged and her chest is congested.  Pt is [place] negative in triage.

## 2022-07-04 LAB
CULTURE RESULTS: SIGNIFICANT CHANGE UP
SPECIMEN SOURCE: SIGNIFICANT CHANGE UP

## 2022-07-07 ENCOUNTER — APPOINTMENT (OUTPATIENT)
Dept: OBGYN | Facility: CLINIC | Age: 47
End: 2022-07-07

## 2022-07-07 DIAGNOSIS — R87.612 LOW GRADE SQUAMOUS INTRAEPITHELIAL LESION ON CYTOLOGIC SMEAR OF CERVIX (LGSIL): ICD-10-CM

## 2022-07-07 PROCEDURE — 57452 EXAM OF CERVIX W/SCOPE: CPT

## 2022-07-07 NOTE — PROCEDURE
[Colposcopy] : Colposcopy  [Time out performed] : Pre-procedure time out performed.  Patient's name, date of birth and procedure confirmed. [Consent Obtained] : Consent obtained [LGSIL] : LGSIL [No Abnormalities] : no abnormalities [de-identified] : post hysterectomy

## 2022-07-08 ENCOUNTER — APPOINTMENT (OUTPATIENT)
Dept: OTOLARYNGOLOGY | Facility: CLINIC | Age: 47
End: 2022-07-08

## 2022-07-08 VITALS
HEART RATE: 101 BPM | SYSTOLIC BLOOD PRESSURE: 107 MMHG | HEIGHT: 63 IN | DIASTOLIC BLOOD PRESSURE: 75 MMHG | WEIGHT: 150 LBS | BODY MASS INDEX: 26.58 KG/M2

## 2022-07-08 DIAGNOSIS — R42 DIZZINESS AND GIDDINESS: ICD-10-CM

## 2022-07-08 PROCEDURE — 99204 OFFICE O/P NEW MOD 45 MIN: CPT | Mod: 25

## 2022-07-08 PROCEDURE — 92567 TYMPANOMETRY: CPT

## 2022-07-08 PROCEDURE — 92557 COMPREHENSIVE HEARING TEST: CPT

## 2022-07-08 PROCEDURE — 92504 EAR MICROSCOPY EXAMINATION: CPT

## 2022-07-10 PROBLEM — R42 VERTIGO: Status: ACTIVE | Noted: 2022-07-10

## 2022-07-10 NOTE — ASSESSMENT
[FreeTextEntry1] : 47 year F with vestibular migraines. Audiogram shows AU Hearing -8k. AU Tymp A\par \par Recommend:\par Vestibular Migraine\par - Discuss with patient that Migraines do not always have to take the form of a headache. Sometimes Migraines change over time to presents with Auras that affect the way we perceptive balance.\par - These symptoms are not generally associated with pressure or hearing changes but originate in the brainstem from faulty central processing of balance information from the inner ears. \par - Migraine Handout Given\par - Explained that treatment for Vestibular Migraines is to control the migraine itself with Mg supplements, Lifestyle changes, Improve sleep habits, and target food triggers that may exacerbate migraines\par - If symptoms are not controlled based on these changes can consider Migraine medications\par - Patient wants to try lifestyle changes and mg supplements\par \par -Return to clinic in 6 months or sooner if new/worsen symptoms present\par \par \par \par \par

## 2022-07-10 NOTE — HISTORY OF PRESENT ILLNESS
[de-identified] : 47 year F presents with multiple episodes of feeling unbalanced since 5/2022.\par Patient describe sensation as head being full, unsteady on feet because room is moving despite her not moving, feeling of off balance\par Episodes lasting for hours to 1 day\par no change in hearing\par Has history of migraines but has noticed the pain from the migraines has disappeared but now replaced with this sensation of disequilibrium \par Migraines for past 2 years,\par never had to go to the hospital\par No audiogram, vestibular tests or imaging\par No prior treatments including vestibular therapy\par h/o motion sickness in cars\par denies otologic history including hearing loss, tinnitus, otalgia, ear fullness\par No currently taking benzodiazepines, SSRIs, headache meds

## 2022-07-10 NOTE — DATA REVIEWED
[de-identified] : An audiogram was ordered and performed including pure tones, tympanometry and speech testing for the patients complaint of vertigo\par I have independently reviewed the patient's audiogram from today and my findings include AU Hearing -8k. AU Tymp A

## 2022-07-10 NOTE — PHYSICAL EXAM
[Binocular Microscopic Exam] : Binocular microscopic exam was performed [Normal] : mucosa is normal [Midline] : trachea located in midline position [Hearing Loss Right Only] : normal [Hearing Loss Left Only] : normal [Nystagmus] : ~T no ~M nystagmus was seen [Fukuda Step Test] : Fukuda Step Test was Negative [Romberg's Sign] : Romberg's sign was absent [Bridget-Halltamyke] : [place]-Hallpike: Negative

## 2022-07-10 NOTE — REVIEW OF SYSTEMS
[Vertigo] : vertigo [Lightheadedness] : lightheadedness [Heartburn] : heartburn [As Noted in HPI] : as noted in HPI [Negative] : Mouth and Throat

## 2022-07-11 LAB — BACTERIA UR CULT: NORMAL

## 2022-07-21 ENCOUNTER — APPOINTMENT (OUTPATIENT)
Dept: NEUROLOGY | Facility: CLINIC | Age: 47
End: 2022-07-21

## 2022-07-21 VITALS
BODY MASS INDEX: 26.58 KG/M2 | WEIGHT: 150 LBS | HEIGHT: 63 IN | HEART RATE: 76 BPM | SYSTOLIC BLOOD PRESSURE: 119 MMHG | DIASTOLIC BLOOD PRESSURE: 81 MMHG

## 2022-07-21 DIAGNOSIS — G44.219 EPISODIC TENSION-TYPE HEADACHE, NOT INTRACTABLE: ICD-10-CM

## 2022-07-21 PROCEDURE — 99213 OFFICE O/P EST LOW 20 MIN: CPT

## 2022-07-21 RX ORDER — TINIDAZOLE 500 MG/1
500 TABLET, FILM COATED ORAL DAILY
Qty: 8 | Refills: 0 | Status: DISCONTINUED | COMMUNITY
Start: 2022-01-20 | End: 2022-07-21

## 2022-07-21 RX ORDER — TINIDAZOLE 500 MG/1
500 TABLET, FILM COATED ORAL DAILY
Qty: 8 | Refills: 0 | Status: DISCONTINUED | COMMUNITY
Start: 2021-03-29 | End: 2022-07-21

## 2022-07-21 RX ORDER — ACYCLOVIR 50 MG/G
5 OINTMENT TOPICAL
Qty: 1 | Refills: 1 | Status: DISCONTINUED | COMMUNITY
Start: 2021-09-09 | End: 2022-07-21

## 2022-07-21 NOTE — REVIEW OF SYSTEMS
[Feeling Tired] : feeling tired [Anxiety] : anxiety [As Noted in HPI] : as noted in HPI [Negative] : Cardiovascular

## 2022-07-21 NOTE — HISTORY OF PRESENT ILLNESS
[FreeTextEntry1] : Ms. Emanuel is seen in follow-up, but for a new problem.  I saw her initially in 2020, when she was complaining of a headache.  Because of her history of lupus, I ordered an MRI scan with and without contrast.  It was normal.  She was thought to have tension type headaches.  Her headaches resolved.  In about April of this year, she began to notice a feeling of imbalance and tingling on the right side of the head.  There was no associated headache and there was no focal weakness.  She had a couple of emergency room and urgent care visits for this.  She recently had a urinary tract infection and was treated with antibiotics.  She was also seen by ENT, who diagnosed vestibular migraine.  Over the past 3 or 4 weeks, the symptoms have resolved.

## 2022-07-21 NOTE — ASSESSMENT
[FreeTextEntry1] : Ms. Emanuel is seen in follow-up, but for a new problem.  I saw her initially in 2020, when she was complaining of a headache.  Because of her history of lupus, I ordered an MRI scan with and without contrast.  It was normal.  She was thought to have tension type headaches.  Her headaches resolved.  In about April of this year, she began to notice a feeling of imbalance and tingling on the right side of the head.  There was no associated headache and there was no focal weakness.  She had a couple of emergency room and urgent care visits for this.  She recently had a urinary tract infection and was treated with antibiotics.\par With the exception of the previously noted scar across the bridge of the nose, the general and neurological examinations are normal.  I have spent time reassuring her and have referred her back to her rheumatologist.

## 2022-07-21 NOTE — PHYSICAL EXAM
[General Appearance - Alert] : alert [General Appearance - In No Acute Distress] : in no acute distress [Oriented To Time, Place, And Person] : oriented to person, place, and time [Impaired Insight] : insight and judgment were intact [Affect] : the affect was normal [Person] : oriented to person [Place] : oriented to place [Time] : oriented to time [Concentration Intact] : normal concentrating ability [Visual Intact] : visual attention was ~T not ~L decreased [Naming Objects] : no difficulty naming common objects [Repeating Phrases] : no difficulty repeating a phrase [Writing A Sentence] : no difficulty writing a sentence [Fluency] : fluency intact [Comprehension] : comprehension intact [Reading] : reading intact [Past History] : adequate knowledge of personal past history [Cranial Nerves Optic (II)] : visual acuity intact bilaterally,  visual fields full to confrontation, pupils equal round and reactive to light [Cranial Nerves Oculomotor (III)] : extraocular motion intact [Cranial Nerves Trigeminal (V)] : facial sensation intact symmetrically [Cranial Nerves Facial (VII)] : face symmetrical [Cranial Nerves Vestibulocochlear (VIII)] : hearing was intact bilaterally [Cranial Nerves Glossopharyngeal (IX)] : tongue and palate midline [Cranial Nerves Accessory (XI - Cranial And Spinal)] : head turning and shoulder shrug symmetric [Cranial Nerves Hypoglossal (XII)] : there was no tongue deviation with protrusion [Motor Strength] : muscle strength was normal in all four extremities [No Muscle Atrophy] : normal bulk in all four extremities [Sensation Tactile Decrease] : light touch was intact [Balance] : balance was intact [2+] : Patella left 2+ [1+] : Ankle jerk left 1+ [Sclera] : the sclera and conjunctiva were normal [PERRL With Normal Accommodation] : pupils were equal in size, round, reactive to light, with normal accommodation [Extraocular Movements] : extraocular movements were intact [Neck Appearance] : the appearance of the neck was normal [Neck Cervical Mass (___cm)] : no neck mass was observed [Jugular Venous Distention Increased] : there was no jugular-venous distention [Thyroid Diffuse Enlargement] : the thyroid was not enlarged [Thyroid Nodule] : there were no palpable thyroid nodules [] : no respiratory distress [Auscultation Breath Sounds / Voice Sounds] : lungs were clear to auscultation bilaterally [Heart Rate And Rhythm] : heart rate was normal and rhythm regular [Heart Sounds] : normal S1 and S2 [Heart Sounds Gallop] : no gallops [Murmurs] : no murmurs [Heart Sounds Pericardial Friction Rub] : no pericardial rub [Full Pulse] : the pedal pulses are present [Edema] : there was no peripheral edema [Abnormal Walk] : normal gait [Nail Clubbing] : no clubbing  or cyanosis of the fingernails [Musculoskeletal - Swelling] : no joint swelling seen [Motor Tone] : muscle strength and tone were normal [Past-pointing] : there was no past-pointing [Tremor] : no tremor present [Plantar Reflex Right Only] : normal on the right [Plantar Reflex Left Only] : normal on the left

## 2022-08-04 ENCOUNTER — APPOINTMENT (OUTPATIENT)
Dept: GASTROENTEROLOGY | Facility: CLINIC | Age: 47
End: 2022-08-04

## 2022-08-04 VITALS
RESPIRATION RATE: 17 BRPM | HEART RATE: 90 BPM | WEIGHT: 150 LBS | TEMPERATURE: 97.8 F | SYSTOLIC BLOOD PRESSURE: 115 MMHG | BODY MASS INDEX: 26.58 KG/M2 | OXYGEN SATURATION: 99 % | DIASTOLIC BLOOD PRESSURE: 79 MMHG | HEIGHT: 63 IN

## 2022-08-04 PROCEDURE — 99204 OFFICE O/P NEW MOD 45 MIN: CPT

## 2022-08-05 NOTE — HISTORY OF PRESENT ILLNESS
[Heartburn] : denies heartburn [Nausea] : denies nausea [Vomiting] : denies vomiting [Diarrhea] : diarrhea worsened [Constipation] : denies constipation [Yellow Skin Or Eyes (Jaundice)] : denies jaundice [Abdominal Pain] : denies abdominal pain [Abdominal Swelling] : denies abdominal swelling [Rectal Pain] : denies rectal pain [GERD] : gastroesophageal reflux disease [Hiatus Hernia] : no hiatus hernia [Peptic Ulcer Disease] : no peptic ulcer disease [Pancreatitis] : no pancreatitis [Cholelithiasis] : no cholelithiasis [Kidney Stone] : no kidney stone [Inflammatory Bowel Disease] : no inflammatory bowel disease [Irritable Bowel Syndrome] : no irritable bowel syndrome [Diverticulitis] : no diverticulitis [Alcohol Abuse] : no alcohol abuse [Malignancy] : no malignancy [Abdominal Surgery] : no abdominal surgery [Appendectomy] : no appendectomy [Cholecystectomy] : no cholecystectomy [de-identified] : 47 year old female presents with complaints of diarrhea x1 day. Last night she ate at a local resturant. Subsquently she developed diarrhea loose watery stool. She denies any recent travel, fever or change in medication.  Atbx use recently. Last month she had a UTI. She has never had a colonoscopy. Denies any rectal bleeding, blood in the stool, melena, or hematemesis. She also  complains of gerd like symptoms. Increase in acid reflux and throat clearing. \par  \par \par  [de-identified] : Hysterectomy 2017

## 2022-08-05 NOTE — PHYSICAL EXAM
[General Appearance - Alert] : alert [General Appearance - In No Acute Distress] : in no acute distress [Sclera] : the sclera and conjunctiva were normal [PERRL With Normal Accommodation] : pupils were equal in size, round, and reactive to light [Extraocular Movements] : extraocular movements were intact [Outer Ear] : the ears and nose were normal in appearance [Oropharynx] : the oropharynx was normal [Neck Appearance] : the appearance of the neck was normal [Neck Cervical Mass (___cm)] : no neck mass was observed [Jugular Venous Distention Increased] : there was no jugular-venous distention [Thyroid Diffuse Enlargement] : the thyroid was not enlarged [Thyroid Nodule] : there were no palpable thyroid nodules [] : no respiratory distress [Auscultation Breath Sounds / Voice Sounds] : lungs were clear to auscultation bilaterally [Heart Rate And Rhythm] : heart rate was normal and rhythm regular [Heart Sounds] : normal S1 and S2 [Heart Sounds Gallop] : no gallops [Murmurs] : no murmurs [Heart Sounds Pericardial Friction Rub] : no pericardial rub [Flat] : flat [Soft, Nontender] : the abdomen was soft and nontender [Normal] : normal to percussion [Cervical Lymph Nodes Enlarged Posterior Bilaterally] : posterior cervical [Cervical Lymph Nodes Enlarged Anterior Bilaterally] : anterior cervical [Supraclavicular Lymph Nodes Enlarged Bilaterally] : supraclavicular [Axillary Lymph Nodes Enlarged Bilaterally] : axillary [Femoral Lymph Nodes Enlarged Bilaterally] : femoral [Inguinal Lymph Nodes Enlarged Bilaterally] : inguinal [No CVA Tenderness] : no ~M costovertebral angle tenderness [No Spinal Tenderness] : no spinal tenderness [Abnormal Walk] : normal gait [Nail Clubbing] : no clubbing  or cyanosis of the fingernails [Musculoskeletal - Swelling] : no joint swelling seen [Motor Tone] : muscle strength and tone were normal [Deep Tendon Reflexes (DTR)] : deep tendon reflexes were 2+ and symmetric [Sensation] : the sensory exam was normal to light touch and pinprick [No Focal Deficits] : no focal deficits [Oriented To Time, Place, And Person] : oriented to person, place, and time [Impaired Insight] : insight and judgment were intact [Affect] : the affect was normal [Firm] : not firm [Rigid] : not rigid [Rebound] : no rebound [Guarding] : no guarding [Torres's] : a negative Torres's sign

## 2022-08-05 NOTE — ASSESSMENT
[FreeTextEntry1] : Attending Attestation \par \par  Diarrhea\par \par We discussed diarrhea at length. Treatment depends on the cause and severity of your diarrhea. You\par should drink plenty of fluids to avoid dehydration. You should avoid drinks that contain caffeine and\par milk. Milk may make diarrhea worse. Your doctor may recommend hydration drinks for your infant or\par child. People with severe dehydration may need fluid replacement via an IV line and hospitalization.\par Avoid eating greasy foods, fatty foods, and alcohol. Bananas, applesauce, rice, and toast are helpful\par foods to eat. If you feel too sick to eat, try sucking on ice chips until you can tolerate food.\par \par Patient will have stool cxs preformed. Instructions provided how to obtain sample and where to return specimen. \par \par She will begin taking Flagyl and Cipro after obtaining stool cxs. Medications reviewed side effects and adverse reactions.\par \par She will f/u after diarrhea is gone and will be booked for screening colonoscopy. \par \par She also has GERD like symtpoms she takes Pepcid was told by Dr Castillo she may take 2 times per day.\par A low acid / reflux diet was discussed in great detail including not smoking, not drinking alcohol, and not\par consuming foods that irritate the esophagus. It is helpful to eat small meals throughout the day instead\par of large meals. You should avoid eating before bedtime or lying down after you eat. It can be helpful to\par raise the head of your bed six inches. Additionally, you should maintain a healthy weight and good\par posture.. The patient was given written material to take home and review. \par \par Time spent with patient 45 min \par Patient verbalized understanding of all information provided. All questions answered and reviewed.

## 2022-08-08 ENCOUNTER — APPOINTMENT (OUTPATIENT)
Dept: ULTRASOUND IMAGING | Facility: IMAGING CENTER | Age: 47
End: 2022-08-08

## 2022-08-08 ENCOUNTER — OUTPATIENT (OUTPATIENT)
Dept: OUTPATIENT SERVICES | Facility: HOSPITAL | Age: 47
LOS: 1 days | End: 2022-08-08
Payer: COMMERCIAL

## 2022-08-08 DIAGNOSIS — Z00.8 ENCOUNTER FOR OTHER GENERAL EXAMINATION: ICD-10-CM

## 2022-08-08 DIAGNOSIS — E04.2 NONTOXIC MULTINODULAR GOITER: ICD-10-CM

## 2022-08-08 PROCEDURE — 76536 US EXAM OF HEAD AND NECK: CPT

## 2022-08-09 LAB — GI PCR PANEL, STOOL: NORMAL

## 2022-08-09 PROCEDURE — 76536 US EXAM OF HEAD AND NECK: CPT | Mod: 26

## 2022-08-12 LAB
C DIFF TOXIN B QL PCR REFLEX: NORMAL
GDH ANTIGEN: NOT DETECTED
TOXIN A AND B: NOT DETECTED

## 2022-08-15 NOTE — PHYSICAL EXAM
Per IM   [Chaperone Present] : A chaperone was present in the examining room during all aspects of the physical examination [Appropriately responsive] : appropriately responsive [Alert] : alert [No Acute Distress] : no acute distress [No Lymphadenopathy] : no lymphadenopathy [Regular Rate Rhythm] : regular rate rhythm [No Murmurs] : no murmurs [Clear to Auscultation B/L] : clear to auscultation bilaterally [Soft] : soft [Non-tender] : non-tender [Non-distended] : non-distended [No HSM] : No HSM [No Lesions] : no lesions [No Mass] : no mass [Oriented x3] : oriented x3 [Examination Of The Breasts] : a normal appearance [No Masses] : no breast masses were palpable [Labia Majora] : normal [Labia Minora] : normal [Normal] : normal [Absent] : absent [Uterine Adnexae] : absent

## 2022-08-18 ENCOUNTER — APPOINTMENT (OUTPATIENT)
Dept: NEUROLOGY | Facility: CLINIC | Age: 47
End: 2022-08-18

## 2022-08-22 ENCOUNTER — APPOINTMENT (OUTPATIENT)
Dept: GASTROENTEROLOGY | Facility: CLINIC | Age: 47
End: 2022-08-22

## 2022-08-22 VITALS
SYSTOLIC BLOOD PRESSURE: 116 MMHG | TEMPERATURE: 97.7 F | RESPIRATION RATE: 18 BRPM | HEART RATE: 81 BPM | BODY MASS INDEX: 26.57 KG/M2 | DIASTOLIC BLOOD PRESSURE: 80 MMHG | OXYGEN SATURATION: 100 % | WEIGHT: 150 LBS

## 2022-08-22 DIAGNOSIS — Z87.898 PERSONAL HISTORY OF OTHER SPECIFIED CONDITIONS: ICD-10-CM

## 2022-08-22 DIAGNOSIS — Z20.822 ENCOUNTER FOR PREPROCEDURAL LABORATORY EXAMINATION: ICD-10-CM

## 2022-08-22 DIAGNOSIS — Z01.812 ENCOUNTER FOR PREPROCEDURAL LABORATORY EXAMINATION: ICD-10-CM

## 2022-08-22 DIAGNOSIS — Z12.12 ENCOUNTER FOR SCREENING FOR MALIGNANT NEOPLASM OF COLON: ICD-10-CM

## 2022-08-22 DIAGNOSIS — R13.10 DYSPHAGIA, UNSPECIFIED: ICD-10-CM

## 2022-08-22 DIAGNOSIS — Z12.11 ENCOUNTER FOR SCREENING FOR MALIGNANT NEOPLASM OF COLON: ICD-10-CM

## 2022-08-22 PROCEDURE — 99214 OFFICE O/P EST MOD 30 MIN: CPT

## 2022-08-22 RX ORDER — CIPROFLOXACIN HYDROCHLORIDE 500 MG/1
500 TABLET, FILM COATED ORAL TWICE DAILY
Qty: 14 | Refills: 1 | Status: DISCONTINUED | COMMUNITY
Start: 2022-08-04 | End: 2022-08-22

## 2022-08-22 RX ORDER — METRONIDAZOLE 500 MG/1
500 TABLET ORAL 3 TIMES DAILY
Qty: 21 | Refills: 1 | Status: DISCONTINUED | COMMUNITY
Start: 2022-08-04 | End: 2022-08-22

## 2022-08-22 NOTE — HISTORY OF PRESENT ILLNESS
[de-identified] : 47-year-old woman originally seen for diarrhea.  The diarrhea resolved on its own.  She never took the oral Cipro or Metronidazole.  Her bowel movements are now regular.  She denies rectal bleeding, melena or hematemesis.  She does however complain of acid reflux.  She took the famotidine with relief.  However she is feels sometimes as though food gets stuck in her throat.  She has been avoiding food and alcohol that trigger the reflux.

## 2022-08-22 NOTE — ASSESSMENT
[FreeTextEntry1] : GERD. Dietary and lifestyle modification discussed with the patient.  We will schedule EGD and biopsy to rule out H. pylori.\par Screening colonoscopy.  The importance of colon cancer screening and the colonoscopy prep was discussed with the patient.  She understands and all questions were answered.\par

## 2022-08-24 ENCOUNTER — APPOINTMENT (OUTPATIENT)
Dept: MAMMOGRAPHY | Facility: IMAGING CENTER | Age: 47
End: 2022-08-24

## 2022-08-24 ENCOUNTER — RESULT REVIEW (OUTPATIENT)
Age: 47
End: 2022-08-24

## 2022-08-24 ENCOUNTER — OUTPATIENT (OUTPATIENT)
Dept: OUTPATIENT SERVICES | Facility: HOSPITAL | Age: 47
LOS: 1 days | End: 2022-08-24
Payer: COMMERCIAL

## 2022-08-24 ENCOUNTER — APPOINTMENT (OUTPATIENT)
Dept: ULTRASOUND IMAGING | Facility: IMAGING CENTER | Age: 47
End: 2022-08-24

## 2022-08-24 ENCOUNTER — NON-APPOINTMENT (OUTPATIENT)
Age: 47
End: 2022-08-24

## 2022-08-24 DIAGNOSIS — Z00.8 ENCOUNTER FOR OTHER GENERAL EXAMINATION: ICD-10-CM

## 2022-08-24 DIAGNOSIS — Z01.411 ENCOUNTER FOR GYNECOLOGICAL EXAMINATION (GENERAL) (ROUTINE) WITH ABNORMAL FINDINGS: ICD-10-CM

## 2022-08-24 PROCEDURE — 77063 BREAST TOMOSYNTHESIS BI: CPT | Mod: 26

## 2022-08-24 PROCEDURE — 77063 BREAST TOMOSYNTHESIS BI: CPT

## 2022-08-24 PROCEDURE — 77067 SCR MAMMO BI INCL CAD: CPT

## 2022-08-24 PROCEDURE — 77067 SCR MAMMO BI INCL CAD: CPT | Mod: 26

## 2022-08-24 PROCEDURE — 76641 ULTRASOUND BREAST COMPLETE: CPT | Mod: 26,50

## 2022-08-24 PROCEDURE — 76641 ULTRASOUND BREAST COMPLETE: CPT

## 2022-08-30 ENCOUNTER — NON-APPOINTMENT (OUTPATIENT)
Age: 47
End: 2022-08-30

## 2022-08-30 ENCOUNTER — APPOINTMENT (OUTPATIENT)
Dept: GASTROENTEROLOGY | Facility: CLINIC | Age: 47
End: 2022-08-30

## 2022-08-31 ENCOUNTER — NON-APPOINTMENT (OUTPATIENT)
Age: 47
End: 2022-08-31

## 2022-08-31 ENCOUNTER — APPOINTMENT (OUTPATIENT)
Dept: OBGYN | Facility: CLINIC | Age: 47
End: 2022-08-31

## 2022-08-31 VITALS
HEIGHT: 63 IN | DIASTOLIC BLOOD PRESSURE: 64 MMHG | SYSTOLIC BLOOD PRESSURE: 102 MMHG | WEIGHT: 147 LBS | BODY MASS INDEX: 26.05 KG/M2

## 2022-08-31 PROCEDURE — 99213 OFFICE O/P EST LOW 20 MIN: CPT

## 2022-08-31 PROCEDURE — 81003 URINALYSIS AUTO W/O SCOPE: CPT | Mod: QW

## 2022-08-31 RX ORDER — TERCONAZOLE 8 MG/G
0.8 CREAM VAGINAL
Qty: 1 | Refills: 1 | Status: ACTIVE | COMMUNITY
Start: 2022-08-31 | End: 1900-01-01

## 2022-08-31 NOTE — PHYSICAL EXAM
[Normal] : uterus [No Bleeding] : there was no active vaginal bleeding [Uterine Adnexae] : were not tender and not enlarged [FreeTextEntry4] : PH 4.5

## 2022-09-01 LAB
BILIRUB UR QL STRIP: NORMAL
GLUCOSE UR-MCNC: NORMAL
HCG UR QL: 0.2 EU/DL
HGB UR QL STRIP.AUTO: NORMAL
KETONES UR-MCNC: NORMAL
LEUKOCYTE ESTERASE UR QL STRIP: NORMAL
NITRITE UR QL STRIP: NORMAL
PH UR STRIP: 7
PROT UR STRIP-MCNC: 100
SP GR UR STRIP: 1.03

## 2022-09-04 LAB
CANDIDA VAG CYTO: NOT DETECTED
G VAGINALIS+PREV SP MTYP VAG QL MICRO: NOT DETECTED
T VAGINALIS VAG QL WET PREP: NOT DETECTED

## 2022-09-23 ENCOUNTER — NON-APPOINTMENT (OUTPATIENT)
Age: 47
End: 2022-09-23

## 2022-09-29 ENCOUNTER — APPOINTMENT (OUTPATIENT)
Dept: GASTROENTEROLOGY | Facility: AMBULATORY MEDICAL SERVICES | Age: 47
End: 2022-09-29

## 2022-09-29 PROCEDURE — 45378 DIAGNOSTIC COLONOSCOPY: CPT | Mod: 33

## 2022-09-29 PROCEDURE — 43239 EGD BIOPSY SINGLE/MULTIPLE: CPT | Mod: 59

## 2022-11-10 ENCOUNTER — APPOINTMENT (OUTPATIENT)
Dept: GASTROENTEROLOGY | Facility: CLINIC | Age: 47
End: 2022-11-10

## 2022-11-10 VITALS
TEMPERATURE: 97.7 F | HEART RATE: 76 BPM | BODY MASS INDEX: 27.82 KG/M2 | SYSTOLIC BLOOD PRESSURE: 111 MMHG | OXYGEN SATURATION: 100 % | DIASTOLIC BLOOD PRESSURE: 75 MMHG | WEIGHT: 157 LBS | HEIGHT: 63 IN

## 2022-11-10 DIAGNOSIS — K21.9 GASTRO-ESOPHAGEAL REFLUX DISEASE W/OUT ESOPHAGITIS: ICD-10-CM

## 2022-11-10 PROCEDURE — 99213 OFFICE O/P EST LOW 20 MIN: CPT

## 2022-11-10 RX ORDER — SODIUM PICOSULFATE, MAGNESIUM OXIDE, AND ANHYDROUS CITRIC ACID 10; 3.5; 12 MG/160ML; G/160ML; G/160ML
10-3.5-12 MG-GM LIQUID ORAL
Qty: 1 | Refills: 0 | Status: COMPLETED | COMMUNITY
Start: 2022-08-22 | End: 2022-11-10

## 2022-11-10 RX ORDER — CEFPODOXIME PROXETIL 100 MG/1
100 TABLET, FILM COATED ORAL
Qty: 20 | Refills: 0 | Status: COMPLETED | COMMUNITY
Start: 2022-06-22 | End: 2022-11-10

## 2022-11-10 NOTE — ASSESSMENT
[FreeTextEntry1] : GERD. Dietary and lifestyle modification discussed with the patient.\par Colon cancer screening.  The importance of screening colonoscopy was discussed with the patient.  She will return in 10 years for a follow-up colonoscopy.

## 2022-11-10 NOTE — PHYSICAL EXAM
[Normal] : normal bowel sounds, non-tender, no masses, soft, no no hepato-splenomegaly [Cervical Lymph Nodes Enlarged Posterior Bilaterally] : no posterior cervical lymphadenopathy [Supraclavicular Lymph Nodes Enlarged Bilaterally] : no supraclavicular lymphadenopathy [No CVA Tenderness] : no CVA  tenderness [Abnormal Walk] : normal gait

## 2022-11-10 NOTE — HISTORY OF PRESENT ILLNESS
[FreeTextEntry1] : 47-year-old woman with acid reflux reflux is under control.  She underwent an EGD and biopsy on September 29 with Dr. Madera and that was unremarkable except the presence of an hiatus hernia.  Biopsies were negative.  Screening colonoscopy performed the same day was also negative.  Patient tolerated both procedures well.

## 2022-11-20 ENCOUNTER — NON-APPOINTMENT (OUTPATIENT)
Age: 47
End: 2022-11-20

## 2023-01-06 ENCOUNTER — APPOINTMENT (OUTPATIENT)
Dept: OPHTHALMOLOGY | Facility: CLINIC | Age: 48
End: 2023-01-06
Payer: COMMERCIAL

## 2023-01-06 ENCOUNTER — NON-APPOINTMENT (OUTPATIENT)
Age: 48
End: 2023-01-06

## 2023-01-06 PROCEDURE — 92083 EXTENDED VISUAL FIELD XM: CPT

## 2023-01-06 PROCEDURE — 92012 INTRM OPH EXAM EST PATIENT: CPT

## 2023-01-20 ENCOUNTER — APPOINTMENT (OUTPATIENT)
Dept: OTOLARYNGOLOGY | Facility: CLINIC | Age: 48
End: 2023-01-20

## 2023-02-14 ENCOUNTER — APPOINTMENT (OUTPATIENT)
Dept: RHEUMATOLOGY | Facility: CLINIC | Age: 48
End: 2023-02-14
Payer: COMMERCIAL

## 2023-02-14 VITALS
TEMPERATURE: 97.1 F | HEART RATE: 87 BPM | DIASTOLIC BLOOD PRESSURE: 84 MMHG | HEIGHT: 63 IN | RESPIRATION RATE: 16 BRPM | BODY MASS INDEX: 27.46 KG/M2 | WEIGHT: 155 LBS | OXYGEN SATURATION: 95 % | SYSTOLIC BLOOD PRESSURE: 130 MMHG

## 2023-02-14 PROCEDURE — 99214 OFFICE O/P EST MOD 30 MIN: CPT

## 2023-02-14 NOTE — PHYSICAL EXAM
[General Appearance - Alert] : alert [General Appearance - In No Acute Distress] : in no acute distress [General Appearance - Well Nourished] : well nourished [General Appearance - Well Developed] : well developed [Sclera] : the sclera and conjunctiva were normal [Extraocular Movements] : extraocular movements were intact [Strabismus] : no strabismus was seen [Outer Ear] : the ears and nose were normal in appearance [Oropharynx] : the oropharynx was normal [Neck Appearance] : the appearance of the neck was normal [Neck Cervical Mass (___cm)] : no neck mass was observed [Jugular Venous Distention Increased] : there was no jugular-venous distention [Thyroid Diffuse Enlargement] : the thyroid was not enlarged [FreeTextEntry1] : bibasilar rales [Heart Rate And Rhythm] : heart rate was normal and rhythm regular [Heart Sounds] : normal S1 and S2 [Heart Sounds Gallop] : no gallops [Murmurs] : no murmurs [Heart Sounds Pericardial Friction Rub] : no pericardial rub [Edema] : there was no peripheral edema [Bowel Sounds] : normal bowel sounds [Abdomen Soft] : soft [Abdomen Tenderness] : non-tender [Abdomen Mass (___ Cm)] : no abdominal mass palpated [Cervical Lymph Nodes Enlarged Posterior Bilaterally] : posterior cervical [Cervical Lymph Nodes Enlarged Anterior Bilaterally] : anterior cervical [Supraclavicular Lymph Nodes Enlarged Bilaterally] : supraclavicular [No CVA Tenderness] : no ~M costovertebral angle tenderness [No Spinal Tenderness] : no spinal tenderness [Abnormal Walk] : normal gait [Nail Clubbing] : no clubbing  or cyanosis of the fingernails [Musculoskeletal - Swelling] : no joint swelling seen [Motor Tone] : muscle strength and tone were normal [Skin Color & Pigmentation] : normal skin color and pigmentation [Skin Turgor] : normal skin turgor [] : no rash [Sensation] : the sensory exam was normal to light touch and pinprick [Motor Exam] : the motor exam was normal [Oriented To Time, Place, And Person] : oriented to person, place, and time [Affect] : the affect was normal [Impaired Insight] : insight and judgment were intact

## 2023-02-14 NOTE — HISTORY OF PRESENT ILLNESS
[FreeTextEntry1] : 1.  SLE: Onset in 2012 with fatigue, fever, arthritis, and nephritis.  A kidney biopsy was performed around 2013 (results unknown).  She was treated with MMF 3 g/d and did well until 2016 at which time she had a  flare (extra-renal: malar rash, SLE in the scalp, Raynauds, alopecia).  Plaquenil and steroids were added.  Since that time she has done quite well.  She has not had cardiopulmonary involvement, oral ulcers, thrombosis. In Apr 2020 she developed a heavy chest, which improved spontaneously.  She also had pain in her fingertips, although no ischemic lesions were noted.  These also improved on their own. In early Jun 2021 she reported nausea without vomiting or abdominal pain that was improving on its own.  She also noted an abnormal sensation in the fingertips and toes without pain or discoloration. \par 2.  OB history:  uncomplicated pregnancy at age 16\par 3.  Migraine: long-standing problem, although not problematic in recent times until Aug 2020 at which time she had headache for a week.  The symptoms resolved. She saw a neurologist, and an MRI was ordered. However, she was not able to tolerate confinement in the machine. \par 4.  RTC tendonitis: pain in the left shoulder that was improving in late spring 2021 with conservative measures.\par \par Meds\par Probiotic\par Plaquenil 400 mg/day\par CellCept 1.0 gm/d\par Vitamin D 2000 IU/d\par \par Vaccine\par Prevnar 13 10/16/18 ([id number]; exp 2/20)\par PNVX 23 10/1/19 ([id number]; exp 3/25/21)

## 2023-02-14 NOTE — ASSESSMENT
[FreeTextEntry1] : 1.  SLE: Onset in 2012 with fatigue, fever, arthritis, and nephritis.  A kidney biopsy was performed around 2013 (results unknown).  She was treated with MMF 3 g/d and did well until 2016 at which time she had a  flare (extra-renal: malar rash, SLE in the scalp, Raynauds, alopecia).  Plaquenil and steroids were added.  Since that time she has done quite well.  She has not had cardiopulmonary involvement, oral ulcers, thrombosis. In Apr 2020 she developed a heavy chest, which improved spontaneously.  She also had pain in her fingertips, although no ischemic lesions were noted.  These also improved on their own. In early Jun 2021 she reported nausea without vomiting or abdominal pain that was improving on its own.  She also noted an abnormal sensation in the fingertips and toes without pain or discoloration. \par 2.  OB history:  uncomplicated pregnancy at age 16\par 3.  Migraine: long-standing problem, although not problematic in recent times until Aug 2020 at which time she had headache for a week.  The symptoms resolved. She saw a neurologist, and an MRI was ordered. However, she was not able to tolerate confinement in the machine. \par 4.  RTC tendonitis: pain in the left shoulder that was improving in late spring 2021 with conservative measures.\par \par Plan:\par 1.  Lab tests\par 2.  Contact me one week\par 3.  Return 3 months\par 4.  Shingrix vaccine recommended\par 5.  BMD needed in near future\par 6.  Calcium\par 7.  Likely reduce MMF to 750 mg/d\par 8.  Systemic Lupus Erythematosus, known as lupus, is a chronic autoimmune disease that can affect any organ in the body posing threats to proper organ function and even to life. Therefore, close surveillance of all bodily functions is required, including but not limited to central and peripheral nervous system, ocular and auditory systems, cardiopulmonary function, kidney function, mucocutaneous and musculoskeletal systems as well as constitutional manifestations. Surveillance consists of history, physical, and laboratory tests. Treatment varies, but most of the drugs used are high risk and therefore also require close monitoring in the form of blood and urine tests.\par 9.  High risk medications used in the treatment of rheumatic diseases include steroids, disease modifying agents, immunosuppressive therapies, antimalarials, biologics, and chemotherapy. Regardless of which drug or class of drug, the potential toxicities of these therapies mandate close monitoring in the form of a history, physical, and laboratory tests.\par \par

## 2023-02-15 LAB
25(OH)D3 SERPL-MCNC: 42.3 NG/ML
ALBUMIN SERPL ELPH-MCNC: 4.1 G/DL
ALP BLD-CCNC: 104 U/L
ALT SERPL-CCNC: 27 U/L
ANION GAP SERPL CALC-SCNC: 16 MMOL/L
APPEARANCE: CLEAR
AST SERPL-CCNC: 29 U/L
BACTERIA: NEGATIVE
BASOPHILS # BLD AUTO: 0.01 K/UL
BASOPHILS NFR BLD AUTO: 0.2 %
BILIRUB SERPL-MCNC: 0.2 MG/DL
BILIRUBIN URINE: NEGATIVE
BLOOD URINE: NEGATIVE
BUN SERPL-MCNC: 18 MG/DL
C3 SERPL-MCNC: 90 MG/DL
C4 SERPL-MCNC: 14 MG/DL
CALCIUM SERPL-MCNC: 10 MG/DL
CHLORIDE SERPL-SCNC: 105 MMOL/L
CK SERPL-CCNC: 96 U/L
CO2 SERPL-SCNC: 24 MMOL/L
COLOR: YELLOW
CREAT SERPL-MCNC: 0.7 MG/DL
CREAT SPEC-SCNC: 173 MG/DL
CREAT/PROT UR: 1.3 RATIO
DSDNA AB SER-ACNC: 430 IU/ML
EGFR: 107 ML/MIN/1.73M2
EOSINOPHIL # BLD AUTO: 0.03 K/UL
EOSINOPHIL NFR BLD AUTO: 0.6 %
GLUCOSE QUALITATIVE U: NEGATIVE
HCT VFR BLD CALC: 39.1 %
HGB BLD-MCNC: 12.7 G/DL
HYALINE CASTS: 0 /LPF
IMM GRANULOCYTES NFR BLD AUTO: 0.2 %
KETONES URINE: NEGATIVE
LEUKOCYTE ESTERASE URINE: NEGATIVE
LYMPHOCYTES # BLD AUTO: 2.21 K/UL
LYMPHOCYTES NFR BLD AUTO: 47.3 %
MAN DIFF?: NORMAL
MCHC RBC-ENTMCNC: 29.4 PG
MCHC RBC-ENTMCNC: 32.5 GM/DL
MCV RBC AUTO: 90.5 FL
MICROSCOPIC-UA: NORMAL
MONOCYTES # BLD AUTO: 0.4 K/UL
MONOCYTES NFR BLD AUTO: 8.6 %
NEUTROPHILS # BLD AUTO: 2.01 K/UL
NEUTROPHILS NFR BLD AUTO: 43.1 %
NITRITE URINE: NEGATIVE
PH URINE: 6
PLATELET # BLD AUTO: 164 K/UL
POTASSIUM SERPL-SCNC: 4.6 MMOL/L
PROT SERPL-MCNC: 7.2 G/DL
PROT UR-MCNC: 221 MG/DL
PROTEIN URINE: ABNORMAL
RBC # BLD: 4.32 M/UL
RBC # FLD: 12.8 %
RED BLOOD CELLS URINE: 1 /HPF
SODIUM SERPL-SCNC: 145 MMOL/L
SPECIFIC GRAVITY URINE: >=1.03
SQUAMOUS EPITHELIAL CELLS: 2 /HPF
UROBILINOGEN URINE: NORMAL
WBC # FLD AUTO: 4.67 K/UL
WHITE BLOOD CELLS URINE: 2 /HPF

## 2023-02-17 ENCOUNTER — NON-APPOINTMENT (OUTPATIENT)
Age: 48
End: 2023-02-17

## 2023-02-25 ENCOUNTER — NON-APPOINTMENT (OUTPATIENT)
Age: 48
End: 2023-02-25

## 2023-03-07 ENCOUNTER — APPOINTMENT (OUTPATIENT)
Dept: RHEUMATOLOGY | Facility: CLINIC | Age: 48
End: 2023-03-07
Payer: COMMERCIAL

## 2023-03-07 VITALS
RESPIRATION RATE: 16 BRPM | HEART RATE: 90 BPM | TEMPERATURE: 97.1 F | SYSTOLIC BLOOD PRESSURE: 133 MMHG | BODY MASS INDEX: 27.46 KG/M2 | OXYGEN SATURATION: 98 % | DIASTOLIC BLOOD PRESSURE: 81 MMHG | WEIGHT: 155 LBS | HEIGHT: 63 IN

## 2023-03-07 DIAGNOSIS — R53.83 OTHER FATIGUE: ICD-10-CM

## 2023-03-07 PROCEDURE — 99214 OFFICE O/P EST MOD 30 MIN: CPT

## 2023-03-07 NOTE — ASSESSMENT
[FreeTextEntry1] : 1.  SLE: Onset in 2012 with fatigue, fever, arthritis, and nephritis.  A kidney biopsy was performed around 2013 (results unknown).  She was treated with MMF 3 g/d and did well until 2016 at which time she had a  flare (extra-renal: malar rash, SLE in the scalp, Raynauds, alopecia).  Plaquenil and steroids were added.  Since that time she has done quite well.  She has not had cardiopulmonary involvement, oral ulcers, thrombosis. In Apr 2020 she developed a heavy chest, which improved spontaneously.  She also had pain in her fingertips, although no ischemic lesions were noted.  These also improved on their own. In early Jun 2021 she reported nausea without vomiting or abdominal pain that was improving on its own.  She also noted an abnormal sensation in the fingertips and toes without pain or discoloration. Her Feb 2023 labs were notable for increased proteinuria and DNA Ab. \par 2.  OB history:  uncomplicated pregnancy at age 16\par 3.  Migraine: long-standing problem, although not problematic in recent times until Aug 2020 at which time she had headache for a week.  The symptoms resolved. She saw a neurologist, and an MRI was ordered. However, she was not able to tolerate confinement in the machine. \par 4.  RTC tendonitis: pain in the left shoulder that was improving in late spring 2021 with conservative measures.\par 5.  COVID Infection: In mid-Feb 2023, she developed fever and congestion secondary to COVID. These symptoms resolved, but she was left quite fatigued. Leg pain then ensued, although this has been getting better. \par \par Plan:\par 1.  Lab tests\par 2.  Contact me one week\par 3.  Return 3 months if labs OK\par 4.  Shingrix vaccine recommended\par 5.  BMD needed in near future\par 6.  Calcium\par 7.  Likely reduce MMF to 750 mg/d-but dosing depends on lab results\par 8.  Systemic Lupus Erythematosus, known as lupus, is a chronic autoimmune disease that can affect any organ in the body posing threats to proper organ function and even to life. Therefore, close surveillance of all bodily functions is required, including but not limited to central and peripheral nervous system, ocular and auditory systems, cardiopulmonary function, kidney function, mucocutaneous and musculoskeletal systems as well as constitutional manifestations. Surveillance consists of history, physical, and laboratory tests. Treatment varies, but most of the drugs used are high risk and therefore also require close monitoring in the form of blood and urine tests.\par 9.  High risk medications used in the treatment of rheumatic diseases include steroids, disease modifying agents, immunosuppressive therapies, antimalarials, biologics, and chemotherapy. Regardless of which drug or class of drug, the potential toxicities of these therapies mandate close monitoring in the form of a history, physical, and laboratory tests.\par \par

## 2023-03-07 NOTE — HISTORY OF PRESENT ILLNESS
[FreeTextEntry1] : 1.  SLE: Onset in 2012 with fatigue, fever, arthritis, and nephritis.  A kidney biopsy was performed around 2013 (results unknown).  She was treated with MMF 3 g/d and did well until 2016 at which time she had a  flare (extra-renal: malar rash, SLE in the scalp, Raynauds, alopecia).  Plaquenil and steroids were added.  Since that time she has done quite well.  She has not had cardiopulmonary involvement, oral ulcers, thrombosis. In Apr 2020 she developed a heavy chest, which improved spontaneously.  She also had pain in her fingertips, although no ischemic lesions were noted.  These also improved on their own. In early Jun 2021 she reported nausea without vomiting or abdominal pain that was improving on its own.  She also noted an abnormal sensation in the fingertips and toes without pain or discoloration. Her Feb 2023 labs were notable for increased proteinuria and DNA Ab. \par 2.  OB history:  uncomplicated pregnancy at age 16\par 3.  Migraine: long-standing problem, although not problematic in recent times until Aug 2020 at which time she had headache for a week.  The symptoms resolved. She saw a neurologist, and an MRI was ordered. However, she was not able to tolerate confinement in the machine. \par 4.  RTC tendonitis: pain in the left shoulder that was improving in late spring 2021 with conservative measures.\par 5.  COVID Infection: In mid-Feb 2023, she developed fever and congestion secondary to COVID. These symptoms resolved, but she was left quite fatigued. Leg pain then ensued, although this has been getting better. \par \par Meds\par Probiotic\par Plaquenil 400 mg/day\par CellCept 1.0 gm/d\par Vitamin D 2000 IU/d\par \par Vaccine\par Prevnar 13 10/16/18 ([id number]; exp 2/20)\par PNVX 23 10/1/19 ([id number]; exp 3/25/21)

## 2023-03-07 NOTE — PHYSICAL EXAM
[General Appearance - Alert] : alert [General Appearance - In No Acute Distress] : in no acute distress [General Appearance - Well Nourished] : well nourished [General Appearance - Well Developed] : well developed [Sclera] : the sclera and conjunctiva were normal [Extraocular Movements] : extraocular movements were intact [Strabismus] : no strabismus was seen [Outer Ear] : the ears and nose were normal in appearance [Oropharynx] : the oropharynx was normal [Neck Appearance] : the appearance of the neck was normal [Neck Cervical Mass (___cm)] : no neck mass was observed [Jugular Venous Distention Increased] : there was no jugular-venous distention [Thyroid Diffuse Enlargement] : the thyroid was not enlarged [FreeTextEntry1] : bibasilar rales [Heart Rate And Rhythm] : heart rate was normal and rhythm regular [Heart Sounds] : normal S1 and S2 [Heart Sounds Gallop] : no gallops [Murmurs] : no murmurs [Heart Sounds Pericardial Friction Rub] : no pericardial rub [Edema] : there was no peripheral edema [Bowel Sounds] : normal bowel sounds [Abdomen Soft] : soft [Abdomen Tenderness] : non-tender [Abdomen Mass (___ Cm)] : no abdominal mass palpated [Cervical Lymph Nodes Enlarged Posterior Bilaterally] : posterior cervical [Cervical Lymph Nodes Enlarged Anterior Bilaterally] : anterior cervical [Supraclavicular Lymph Nodes Enlarged Bilaterally] : supraclavicular [No CVA Tenderness] : no ~M costovertebral angle tenderness [No Spinal Tenderness] : no spinal tenderness [Abnormal Walk] : normal gait [Nail Clubbing] : no clubbing  or cyanosis of the fingernails [Musculoskeletal - Swelling] : no joint swelling seen [Motor Tone] : muscle strength and tone were normal [Skin Color & Pigmentation] : normal skin color and pigmentation [Skin Turgor] : normal skin turgor [] : no rash [Sensation] : the sensory exam was normal to light touch and pinprick [Motor Exam] : the motor exam was normal [Oriented To Time, Place, And Person] : oriented to person, place, and time [Impaired Insight] : insight and judgment were intact [Affect] : the affect was normal

## 2023-03-08 LAB
ALBUMIN SERPL ELPH-MCNC: 3.7 G/DL
ALP BLD-CCNC: 101 U/L
ALT SERPL-CCNC: 16 U/L
ANION GAP SERPL CALC-SCNC: 13 MMOL/L
APPEARANCE: CLEAR
AST SERPL-CCNC: 24 U/L
BACTERIA: NEGATIVE
BASOPHILS # BLD AUTO: 0.02 K/UL
BASOPHILS NFR BLD AUTO: 0.4 %
BILIRUB SERPL-MCNC: 0.3 MG/DL
BILIRUBIN URINE: NEGATIVE
BLOOD URINE: NEGATIVE
BUN SERPL-MCNC: 14 MG/DL
C3 SERPL-MCNC: 88 MG/DL
C4 SERPL-MCNC: 14 MG/DL
CALCIUM SERPL-MCNC: 9.4 MG/DL
CHLORIDE SERPL-SCNC: 103 MMOL/L
CK SERPL-CCNC: 115 U/L
CO2 SERPL-SCNC: 25 MMOL/L
COLOR: YELLOW
CREAT SERPL-MCNC: 0.7 MG/DL
CREAT SPEC-SCNC: 230 MG/DL
CREAT/PROT UR: 0.8 RATIO
EGFR: 107 ML/MIN/1.73M2
EOSINOPHIL # BLD AUTO: 0.05 K/UL
EOSINOPHIL NFR BLD AUTO: 1 %
GLUCOSE QUALITATIVE U: NEGATIVE
HCT VFR BLD CALC: 37.2 %
HGB BLD-MCNC: 12.4 G/DL
HYALINE CASTS: 0 /LPF
IMM GRANULOCYTES NFR BLD AUTO: 0.2 %
KETONES URINE: NEGATIVE
LEUKOCYTE ESTERASE URINE: NEGATIVE
LYMPHOCYTES # BLD AUTO: 2.33 K/UL
LYMPHOCYTES NFR BLD AUTO: 46.3 %
MAN DIFF?: NORMAL
MCHC RBC-ENTMCNC: 28.8 PG
MCHC RBC-ENTMCNC: 33.3 GM/DL
MCV RBC AUTO: 86.3 FL
MICROSCOPIC-UA: NORMAL
MONOCYTES # BLD AUTO: 0.53 K/UL
MONOCYTES NFR BLD AUTO: 10.5 %
NEUTROPHILS # BLD AUTO: 2.09 K/UL
NEUTROPHILS NFR BLD AUTO: 41.6 %
NITRITE URINE: NEGATIVE
PH URINE: 6
PLATELET # BLD AUTO: 185 K/UL
POTASSIUM SERPL-SCNC: 4.6 MMOL/L
PROT SERPL-MCNC: 7.3 G/DL
PROT UR-MCNC: 172 MG/DL
PROTEIN URINE: ABNORMAL
RBC # BLD: 4.31 M/UL
RBC # FLD: 12.7 %
RED BLOOD CELLS URINE: 3 /HPF
SODIUM SERPL-SCNC: 141 MMOL/L
SPECIFIC GRAVITY URINE: >=1.03
SQUAMOUS EPITHELIAL CELLS: 16 /HPF
UROBILINOGEN URINE: NORMAL
WBC # FLD AUTO: 5.03 K/UL
WHITE BLOOD CELLS URINE: 2 /HPF

## 2023-03-09 LAB — DSDNA AB SER-ACNC: 477 IU/ML

## 2023-03-16 ENCOUNTER — EMERGENCY (EMERGENCY)
Facility: HOSPITAL | Age: 48
LOS: 1 days | Discharge: HOME | End: 2023-03-16
Attending: STUDENT IN AN ORGANIZED HEALTH CARE EDUCATION/TRAINING PROGRAM | Admitting: STUDENT IN AN ORGANIZED HEALTH CARE EDUCATION/TRAINING PROGRAM
Payer: COMMERCIAL

## 2023-03-16 VITALS
OXYGEN SATURATION: 100 % | TEMPERATURE: 99 F | DIASTOLIC BLOOD PRESSURE: 70 MMHG | SYSTOLIC BLOOD PRESSURE: 119 MMHG | HEART RATE: 107 BPM | RESPIRATION RATE: 16 BRPM

## 2023-03-16 VITALS
SYSTOLIC BLOOD PRESSURE: 135 MMHG | OXYGEN SATURATION: 100 % | HEART RATE: 90 BPM | RESPIRATION RATE: 17 BRPM | TEMPERATURE: 98 F | DIASTOLIC BLOOD PRESSURE: 90 MMHG

## 2023-03-16 LAB
ALBUMIN SERPL ELPH-MCNC: 4 G/DL — SIGNIFICANT CHANGE UP (ref 3.3–5)
ALP SERPL-CCNC: 93 U/L — SIGNIFICANT CHANGE UP (ref 40–120)
ALT FLD-CCNC: 21 U/L — SIGNIFICANT CHANGE UP (ref 4–33)
ANION GAP SERPL CALC-SCNC: 15 MMOL/L — HIGH (ref 7–14)
AST SERPL-CCNC: 30 U/L — SIGNIFICANT CHANGE UP (ref 4–32)
BASOPHILS # BLD AUTO: 0.01 K/UL — SIGNIFICANT CHANGE UP (ref 0–0.2)
BASOPHILS NFR BLD AUTO: 0.2 % — SIGNIFICANT CHANGE UP (ref 0–2)
BILIRUB SERPL-MCNC: 0.4 MG/DL — SIGNIFICANT CHANGE UP (ref 0.2–1.2)
BUN SERPL-MCNC: 5 MG/DL — LOW (ref 7–23)
CALCIUM SERPL-MCNC: 9.9 MG/DL — SIGNIFICANT CHANGE UP (ref 8.4–10.5)
CHLORIDE SERPL-SCNC: 105 MMOL/L — SIGNIFICANT CHANGE UP (ref 98–107)
CO2 SERPL-SCNC: 23 MMOL/L — SIGNIFICANT CHANGE UP (ref 22–31)
CREAT SERPL-MCNC: 0.65 MG/DL — SIGNIFICANT CHANGE UP (ref 0.5–1.3)
EGFR: 109 ML/MIN/1.73M2 — SIGNIFICANT CHANGE UP
EOSINOPHIL # BLD AUTO: 0 K/UL — SIGNIFICANT CHANGE UP (ref 0–0.5)
EOSINOPHIL NFR BLD AUTO: 0 % — SIGNIFICANT CHANGE UP (ref 0–6)
GLUCOSE SERPL-MCNC: 98 MG/DL — SIGNIFICANT CHANGE UP (ref 70–99)
HCT VFR BLD CALC: 36.9 % — SIGNIFICANT CHANGE UP (ref 34.5–45)
HGB BLD-MCNC: 12.6 G/DL — SIGNIFICANT CHANGE UP (ref 11.5–15.5)
IANC: 2.84 K/UL — SIGNIFICANT CHANGE UP (ref 1.8–7.4)
IMM GRANULOCYTES NFR BLD AUTO: 0.2 % — SIGNIFICANT CHANGE UP (ref 0–0.9)
LYMPHOCYTES # BLD AUTO: 1.49 K/UL — SIGNIFICANT CHANGE UP (ref 1–3.3)
LYMPHOCYTES # BLD AUTO: 31.4 % — SIGNIFICANT CHANGE UP (ref 13–44)
MCHC RBC-ENTMCNC: 28.5 PG — SIGNIFICANT CHANGE UP (ref 27–34)
MCHC RBC-ENTMCNC: 34.1 GM/DL — SIGNIFICANT CHANGE UP (ref 32–36)
MCV RBC AUTO: 83.5 FL — SIGNIFICANT CHANGE UP (ref 80–100)
MONOCYTES # BLD AUTO: 0.4 K/UL — SIGNIFICANT CHANGE UP (ref 0–0.9)
MONOCYTES NFR BLD AUTO: 8.4 % — SIGNIFICANT CHANGE UP (ref 2–14)
NEUTROPHILS # BLD AUTO: 2.84 K/UL — SIGNIFICANT CHANGE UP (ref 1.8–7.4)
NEUTROPHILS NFR BLD AUTO: 59.8 % — SIGNIFICANT CHANGE UP (ref 43–77)
NRBC # BLD: 0 /100 WBCS — SIGNIFICANT CHANGE UP (ref 0–0)
NRBC # FLD: 0 K/UL — SIGNIFICANT CHANGE UP (ref 0–0)
PLATELET # BLD AUTO: 153 K/UL — SIGNIFICANT CHANGE UP (ref 150–400)
POTASSIUM SERPL-MCNC: 4.7 MMOL/L — SIGNIFICANT CHANGE UP (ref 3.5–5.3)
POTASSIUM SERPL-SCNC: 4.7 MMOL/L — SIGNIFICANT CHANGE UP (ref 3.5–5.3)
PROT SERPL-MCNC: 7.8 G/DL — SIGNIFICANT CHANGE UP (ref 6–8.3)
RBC # BLD: 4.42 M/UL — SIGNIFICANT CHANGE UP (ref 3.8–5.2)
RBC # FLD: 12.3 % — SIGNIFICANT CHANGE UP (ref 10.3–14.5)
SODIUM SERPL-SCNC: 143 MMOL/L — SIGNIFICANT CHANGE UP (ref 135–145)
TROPONIN T, HIGH SENSITIVITY RESULT: 13 NG/L — SIGNIFICANT CHANGE UP
WBC # BLD: 4.75 K/UL — SIGNIFICANT CHANGE UP (ref 3.8–10.5)
WBC # FLD AUTO: 4.75 K/UL — SIGNIFICANT CHANGE UP (ref 3.8–10.5)

## 2023-03-16 PROCEDURE — 99285 EMERGENCY DEPT VISIT HI MDM: CPT

## 2023-03-16 RX ORDER — SODIUM CHLORIDE 9 MG/ML
1000 INJECTION INTRAMUSCULAR; INTRAVENOUS; SUBCUTANEOUS ONCE
Refills: 0 | Status: COMPLETED | OUTPATIENT
Start: 2023-03-16 | End: 2023-03-16

## 2023-03-16 RX ADMIN — SODIUM CHLORIDE 1000 MILLILITER(S): 9 INJECTION INTRAMUSCULAR; INTRAVENOUS; SUBCUTANEOUS at 14:57

## 2023-03-16 NOTE — ED ADULT TRIAGE NOTE - CHIEF COMPLAINT QUOTE
Pt complaining of back pain, weakness, vomiting. Patient states that she had COVID-19 in February and states it may have caused her lupus to flare up. Denies chest pain, headache and dizziness. Hx SLE.

## 2023-03-16 NOTE — ED ADULT NURSE NOTE - OBJECTIVE STATEMENT
pt aox4, ambulatory from home, hx of Lupus- had covid 1 month ago- comes in for onset of nausea and generalized malaise. pt denies any pain, issues with BM or voiding. pt states she drinks a lot of water at home. no fevers. She states "I just don't feel well". pt well appearing, breathing even and unlabored. no chest pain. no headache. no weakness. 20g IV inserted by paramedic intern. IV maintained, blood return noted and flushed well.

## 2023-03-16 NOTE — ED PROVIDER NOTE - CLINICAL SUMMARY MEDICAL DECISION MAKING FREE TEXT BOX
47-year-old female with PMH of lupus presents with lightheadedness.  Exam unremarkable, no focal neurologic deficits, gait normal.  Will give IV fluids check CBC and CMP and troponin for chest heaviness, EKG.  Dispo pending results and clinical course. 47-year-old female with PMH of lupus presents with lightheadedness.  Exam unremarkable, no focal neurologic deficits, gait normal.  Will give IV fluids check CBC and CMP and troponin for chest heaviness, EKG.  Dispo pending results and clinical course.    Dr. Perkins: See attending attestation.

## 2023-03-16 NOTE — ED PROVIDER NOTE - NSFOLLOWUPINSTRUCTIONS_ED_ALL_ED_FT
You were evaluated in the emergency department for weakness/dizziness.  Your results were discussed with you and are attached.  A steroid medication called prednisone was sent to your pharmacy.  Please  and take as directed–40 mg once a day for 5 days total.  Please follow-up with your primary care doctor within 1 week and your specialists as scheduled.  Your symptoms were likely caused by a lupus flare.    You may take 975 mg Tylenol (acetaminophen) every six hours as needed for fever or pain.    If you have fever, chills, nausea, vomiting, new or worsening pain, or if you have any new concerning symptoms return to the Emergency Department.    Rest, drink plenty of fluids.  Advance activity as tolerated.  Continue all previously prescribed medications as directed.  Follow up with your primary care physician in 48-72 hours- bring copies of your results.  Return to the ER for worsening or persistent symptoms, and/or ANY NEW OR CONCERNING SYMPTOMS. If you have issues obtaining follow up, please call: [phone] to obtain a doctor or specialist who takes your insurance in your area.

## 2023-03-16 NOTE — ED PROVIDER NOTE - PATIENT PORTAL LINK FT
You can access the FollowMyHealth Patient Portal offered by [facility] by registering at the following website: [email or web address]. By joining [employer]’s FollowMyHealth portal, you will also be able to view your health information using other applications (apps) compatible with our system.

## 2023-03-16 NOTE — ED PROVIDER NOTE - OBJECTIVE STATEMENT
47-year-old female with history of lupus on Plaquenil and mycophenolate presents with feeling of weakness, lightheadedness, feeling like passing out that started at 9 AM today has been intermittent.  No loss of consciousness.  Patient states that in February she was positive for COVID and thinks that that may have flared up her lupus.  Patient states that this does feel similar to her prior lupus flares however she has only had 2 and was diagnosed in 2013 so cannot say for sure.  Patient endorses some reflux symptoms and nausea but no vomiting.  Denies headache, vision changes, cough, congestion, rhinorrhea, chest pain, shortness of breath, abdominal pain, back pain, dysuria, constipation, diarrhea, vomiting, new rashes.  Patient states that she feels a heaviness in her chest but it is not chest pain.  Patient states that she "just feels off".  Patient states that she has changed her diet recently to a plant-based diet after getting lab work in February that showed she had increased proteinuria from her primary care doctor.  No recent travel, no known sick contacts

## 2023-03-16 NOTE — ED PROVIDER NOTE - NS ED ROS FT
REVIEW OF SYSTEMS:     CONSTITUTIONAL: No fever, weight loss, + fatigue  EYES: No eye pain, visual disturbances, or discharge  ENMT:  No difficulty hearing, tinnitus, vertigo; No sinus or throat pain  NECK: No pain or stiffness  RESPIRATORY: No cough, wheezing, chills or hemoptysis; No shortness of breath  CARDIOVASCULAR: No chest pain, palpitations, +dizziness, or leg swelling  GASTROINTESTINAL: No abdominal or epigastric pain. No nausea, vomiting, or hematemesis; No diarrhea or constipation. No melena or hematochezia.  GENITOURINARY: No dysuria, frequency, hematuria, or incontinence  NEUROLOGICAL: No headaches, memory loss, loss of strength, numbness, or tremors  SKIN: No itching, burning, rashes, or lesions

## 2023-03-16 NOTE — ED PROVIDER NOTE - PHYSICAL EXAMINATION
GENERAL: well appearing in no acute distress, non-toxic appearing  HEAD: normocephalic, atraumatic  HEENT: normal conjunctiva, oral mucosa moist, no JVD  CARDIAC: regular rate and rhythm, normal S1S2, no appreciable murmurs, 2+ pulses in UE/LE b/l  PULM: normal breath sounds, clear to ascultation bilaterally, no rales, rhonchi, wheezing  GI: abdomen nondistended, soft, nontender, no guarding, rebound tenderness  : no CVA tenderness b/l, no suprapubic tenderness  NEURO: no gross motor or sensory deficits, CN2-12 intact, normal speech, PERRL, EOMI, normal gait, AAOx3  MSK: no peripheral edema, no calf tenderness b/l  SKIN: well-perfused, extremities warm, no visible rashes  PSYCH: appropriate mood and affect

## 2023-03-16 NOTE — ED PROVIDER NOTE - ATTENDING CONTRIBUTION TO CARE
I have personally performed a history and physical examination of the patient and discussed management with the resident as well as the patient.  I reviewed the resident's note and agree with the documented findings and plan of care.  I have authored and modified critical sections of the Provider Note, including but not limited to HPI, Physical Exam and MDM.    47-year-old female with history of lupus on Plaquenil and mycophenolate presents with feeling of weakness, lightheadedness, feeling like passing out that started at 9 AM today has been intermittent.  No loss of consciousness.  Patient states that in February she was positive for COVID and thinks that that may have flared up her lupus.  Patient states that this does feel similar to her prior lupus flares however she has only had 2 and was diagnosed in 2013 so cannot say for sure.  Likely acute lupus flare versus anemia versus electrolyte dysfunction given diet change.  Will obtain CBC, CMP, provide IV/p.o. hydration.  Low suspicion for ACS, screen with troponin.  Independent review of EKG shows isolated T wave inversion in V3 without reciprocal changes, no STEMI.    Independent review of lab results shows no evidence of anemia or metabolic dysfunction, no evidence of ACS.  Likely lupus flare.  Will provide Rx for prednisone.  Discussed importance of outpatient follow-up to which patient verbalized agreement understanding.

## 2023-03-23 ENCOUNTER — NON-APPOINTMENT (OUTPATIENT)
Age: 48
End: 2023-03-23

## 2023-03-23 ENCOUNTER — APPOINTMENT (OUTPATIENT)
Dept: OPHTHALMOLOGY | Facility: CLINIC | Age: 48
End: 2023-03-23
Payer: COMMERCIAL

## 2023-03-23 PROCEDURE — 92012 INTRM OPH EXAM EST PATIENT: CPT

## 2023-04-04 ENCOUNTER — APPOINTMENT (OUTPATIENT)
Dept: RHEUMATOLOGY | Facility: CLINIC | Age: 48
End: 2023-04-04
Payer: COMMERCIAL

## 2023-04-04 VITALS
SYSTOLIC BLOOD PRESSURE: 119 MMHG | TEMPERATURE: 97.1 F | RESPIRATION RATE: 16 BRPM | HEART RATE: 87 BPM | OXYGEN SATURATION: 98 % | BODY MASS INDEX: 26.58 KG/M2 | DIASTOLIC BLOOD PRESSURE: 84 MMHG | WEIGHT: 150 LBS | HEIGHT: 63 IN

## 2023-04-04 DIAGNOSIS — F41.9 ANXIETY DISORDER, UNSPECIFIED: ICD-10-CM

## 2023-04-04 PROCEDURE — 99214 OFFICE O/P EST MOD 30 MIN: CPT

## 2023-04-04 NOTE — PHYSICAL EXAM
[General Appearance - Alert] : alert [General Appearance - In No Acute Distress] : in no acute distress [General Appearance - Well Nourished] : well nourished [General Appearance - Well Developed] : well developed [Extraocular Movements] : extraocular movements were intact [Sclera] : the sclera and conjunctiva were normal [Strabismus] : no strabismus was seen [Outer Ear] : the ears and nose were normal in appearance [Oropharynx] : the oropharynx was normal [Neck Appearance] : the appearance of the neck was normal [Neck Cervical Mass (___cm)] : no neck mass was observed [Jugular Venous Distention Increased] : there was no jugular-venous distention [Thyroid Diffuse Enlargement] : the thyroid was not enlarged [FreeTextEntry1] : bibasilar rales [Heart Rate And Rhythm] : heart rate was normal and rhythm regular [Heart Sounds] : normal S1 and S2 [Heart Sounds Gallop] : no gallops [Murmurs] : no murmurs [Heart Sounds Pericardial Friction Rub] : no pericardial rub [Edema] : there was no peripheral edema [Bowel Sounds] : normal bowel sounds [Abdomen Soft] : soft [Abdomen Tenderness] : non-tender [Abdomen Mass (___ Cm)] : no abdominal mass palpated [Cervical Lymph Nodes Enlarged Posterior Bilaterally] : posterior cervical [Cervical Lymph Nodes Enlarged Anterior Bilaterally] : anterior cervical [Supraclavicular Lymph Nodes Enlarged Bilaterally] : supraclavicular [No CVA Tenderness] : no ~M costovertebral angle tenderness [No Spinal Tenderness] : no spinal tenderness [Abnormal Walk] : normal gait [Nail Clubbing] : no clubbing  or cyanosis of the fingernails [Musculoskeletal - Swelling] : no joint swelling seen [Motor Tone] : muscle strength and tone were normal [Skin Color & Pigmentation] : normal skin color and pigmentation [Skin Turgor] : normal skin turgor [] : no rash [Sensation] : the sensory exam was normal to light touch and pinprick [Motor Exam] : the motor exam was normal [Oriented To Time, Place, And Person] : oriented to person, place, and time [Impaired Insight] : insight and judgment were intact [Affect] : the affect was normal

## 2023-04-04 NOTE — ASSESSMENT
[FreeTextEntry1] : 1.  SLE: Onset in 2012 with fatigue, fever, arthritis, and nephritis.  A kidney biopsy was performed around 2013 (results unknown).  She was treated with MMF 3 g/d and did well until 2016 at which time she had a  flare (extra-renal: malar rash, SLE in the scalp, Raynauds, alopecia).  Plaquenil and steroids were added.  Since that time she has done quite well.  She has not had cardiopulmonary involvement, oral ulcers, thrombosis. In Apr 2020 she developed a heavy chest, which improved spontaneously.  She also had pain in her fingertips, although no ischemic lesions were noted.  These also improved on their own. In early Jun 2021 she reported nausea without vomiting or abdominal pain that was improving on its own.  She also noted an abnormal sensation in the fingertips and toes without pain or discoloration. Her Feb 2023 labs were notable for increased proteinuria and DNA Ab. Same was true in Mar.  A similar trend would mean more aggressive treatment with or without a kidney biopsy. \par 2.  OB history:  uncomplicated pregnancy at age 16\par 3.  Migraine: long-standing problem, although not problematic in recent times until Aug 2020 at which time she had headache for a week.  The symptoms resolved. She saw a neurologist, and an MRI was ordered. However, she was not able to tolerate confinement in the machine. \par 4.  RTC tendonitis: pain in the left shoulder that was improving in late spring 2021 with conservative measures.\par 5.  COVID Infection: In mid-Feb 2023, she developed fever and congestion secondary to COVID. These symptoms resolved, but she was left quite fatigued. Leg pain then ensued, although this has been getting better. \par \par Plan:\par 1.  Lab tests\par 2.  Contact me one week\par 3.  Return 3 months if labs OK, but if not, kidney biopsy to be discussed\par 4.  Shingrix vaccine recommended\par 5.  BMD needed in near future\par 6.  Calcium\par 7.  Systemic Lupus Erythematosus, known as lupus, is a chronic autoimmune disease that can affect any organ in the body posing threats to proper organ function and even to life. Therefore, close surveillance of all bodily functions is required, including but not limited to central and peripheral nervous system, ocular and auditory systems, cardiopulmonary function, kidney function, mucocutaneous and musculoskeletal systems as well as constitutional manifestations. Surveillance consists of history, physical, and laboratory tests. Treatment varies, but most of the drugs used are high risk and therefore also require close monitoring in the form of blood and urine tests.\par 8.  High risk medications used in the treatment of rheumatic diseases include steroids, disease modifying agents, immunosuppressive therapies, antimalarials, biologics, and chemotherapy. Regardless of which drug or class of drug, the potential toxicities of these therapies mandate close monitoring in the form of a history, physical, and laboratory tests.\par \par

## 2023-04-04 NOTE — HISTORY OF PRESENT ILLNESS
[FreeTextEntry1] : 1.  SLE: Onset in 2012 with fatigue, fever, arthritis, and nephritis.  A kidney biopsy was performed around 2013 (results unknown).  She was treated with MMF 3 g/d and did well until 2016 at which time she had a  flare (extra-renal: malar rash, SLE in the scalp, Raynauds, alopecia).  Plaquenil and steroids were added.  Since that time she has done quite well.  She has not had cardiopulmonary involvement, oral ulcers, thrombosis. In Apr 2020 she developed a heavy chest, which improved spontaneously.  She also had pain in her fingertips, although no ischemic lesions were noted.  These also improved on their own. In early Jun 2021 she reported nausea without vomiting or abdominal pain that was improving on its own.  She also noted an abnormal sensation in the fingertips and toes without pain or discoloration. Her Feb 2023 labs were notable for increased proteinuria and DNA Ab. Same was true in Mar.  A similar trend would mean more aggressive treatment with or without a kidney biopsy. \par 2.  OB history:  uncomplicated pregnancy at age 16\par 3.  Migraine: long-standing problem, although not problematic in recent times until Aug 2020 at which time she had headache for a week.  The symptoms resolved. She saw a neurologist, and an MRI was ordered. However, she was not able to tolerate confinement in the machine. \par 4.  RTC tendonitis: pain in the left shoulder that was improving in late spring 2021 with conservative measures.\par 5.  COVID Infection: In mid-Feb 2023, she developed fever and congestion secondary to COVID. These symptoms resolved, but she was left quite fatigued. Leg pain then ensued, although this has been getting better. \par \par Meds\par Probiotic\par Plaquenil 400 mg/day\par CellCept 1.0 gm/d\par Vitamin D 2000 IU/d\par \par Vaccine\par Prevnar 13 10/16/18 ([id number]; exp 2/20)\par PNVX 23 10/1/19 ([id number]; exp 3/25/21)

## 2023-04-05 LAB
ALBUMIN SERPL ELPH-MCNC: 3.9 G/DL
ALP BLD-CCNC: 96 U/L
ALT SERPL-CCNC: 36 U/L
ANION GAP SERPL CALC-SCNC: 14 MMOL/L
APPEARANCE: CLEAR
APTT BLD: 32.5 SEC
AST SERPL-CCNC: 37 U/L
BACTERIA: NEGATIVE
BASOPHILS # BLD AUTO: 0.02 K/UL
BASOPHILS NFR BLD AUTO: 0.4 %
BILIRUB SERPL-MCNC: 0.2 MG/DL
BILIRUBIN URINE: NEGATIVE
BLOOD URINE: NEGATIVE
BUN SERPL-MCNC: 8 MG/DL
CALCIUM SERPL-MCNC: 9.2 MG/DL
CHLORIDE SERPL-SCNC: 103 MMOL/L
CK SERPL-CCNC: 65 U/L
CO2 SERPL-SCNC: 23 MMOL/L
COLOR: YELLOW
CREAT SERPL-MCNC: 0.67 MG/DL
CREAT SPEC-SCNC: 99 MG/DL
CREAT/PROT UR: 0.6 RATIO
EGFR: 108 ML/MIN/1.73M2
EOSINOPHIL # BLD AUTO: 0.03 K/UL
EOSINOPHIL NFR BLD AUTO: 0.6 %
GLUCOSE QUALITATIVE U: NEGATIVE
HCT VFR BLD CALC: 37.1 %
HGB BLD-MCNC: 12.4 G/DL
HYALINE CASTS: 0 /LPF
IMM GRANULOCYTES NFR BLD AUTO: 0.2 %
INR PPP: 0.97 RATIO
KETONES URINE: NEGATIVE
LEUKOCYTE ESTERASE URINE: NEGATIVE
LYMPHOCYTES # BLD AUTO: 2.8 K/UL
LYMPHOCYTES NFR BLD AUTO: 54.9 %
MAN DIFF?: NORMAL
MCHC RBC-ENTMCNC: 28.9 PG
MCHC RBC-ENTMCNC: 33.4 GM/DL
MCV RBC AUTO: 86.5 FL
MICROSCOPIC-UA: NORMAL
MONOCYTES # BLD AUTO: 0.39 K/UL
MONOCYTES NFR BLD AUTO: 7.6 %
NEUTROPHILS # BLD AUTO: 1.85 K/UL
NEUTROPHILS NFR BLD AUTO: 36.3 %
NITRITE URINE: NEGATIVE
PH URINE: 7
PLATELET # BLD AUTO: 202 K/UL
POTASSIUM SERPL-SCNC: 4.5 MMOL/L
PROT SERPL-MCNC: 7.4 G/DL
PROT UR-MCNC: 60 MG/DL
PROTEIN URINE: ABNORMAL
PT BLD: 11.3 SEC
RBC # BLD: 4.29 M/UL
RBC # FLD: 13.6 %
RED BLOOD CELLS URINE: 1 /HPF
SODIUM SERPL-SCNC: 140 MMOL/L
SPECIFIC GRAVITY URINE: 1.02
SQUAMOUS EPITHELIAL CELLS: 4 /HPF
UROBILINOGEN URINE: NORMAL
WBC # FLD AUTO: 5.1 K/UL
WHITE BLOOD CELLS URINE: 2 /HPF

## 2023-04-06 LAB
C3 SERPL-MCNC: 98 MG/DL
C4 SERPL-MCNC: 14 MG/DL
CHROMATIN AB SERPL-ACNC: 5.8 AL
ENA RNP AB SER IA-ACNC: 4.2 AL
ENA SM AB SER IA-ACNC: >8 AL

## 2023-04-07 LAB
DSDNA AB SER-ACNC: 348 IU/ML
HYDROXYCHLOROQUINE CONCENTRATION: 1126 NG/ML

## 2023-04-22 NOTE — ED PROVIDER NOTE - DISPOSITION TYPE
on the discharge service for the patient. I have reviewed and made amendments to the documentation where necessary. DISCHARGE

## 2023-05-22 ENCOUNTER — RX RENEWAL (OUTPATIENT)
Age: 48
End: 2023-05-22

## 2023-05-23 ENCOUNTER — APPOINTMENT (OUTPATIENT)
Dept: RHEUMATOLOGY | Facility: CLINIC | Age: 48
End: 2023-05-23
Payer: COMMERCIAL

## 2023-05-23 VITALS
WEIGHT: 150 LBS | TEMPERATURE: 97.1 F | SYSTOLIC BLOOD PRESSURE: 120 MMHG | RESPIRATION RATE: 16 BRPM | HEIGHT: 63 IN | OXYGEN SATURATION: 98 % | HEART RATE: 90 BPM | DIASTOLIC BLOOD PRESSURE: 76 MMHG | BODY MASS INDEX: 26.58 KG/M2

## 2023-05-23 DIAGNOSIS — E55.9 VITAMIN D DEFICIENCY, UNSPECIFIED: ICD-10-CM

## 2023-05-23 PROCEDURE — 99214 OFFICE O/P EST MOD 30 MIN: CPT

## 2023-05-23 NOTE — HISTORY OF PRESENT ILLNESS
[FreeTextEntry1] : 1.  SLE: Onset in 2012 with fatigue, fever, arthritis, and nephritis.  A kidney biopsy was performed around 2013 (results unknown).  She was treated with MMF 3 g/d and did well until 2016 at which time she had a  flare (extra-renal: malar rash, SLE in the scalp, Raynauds, alopecia).  Plaquenil and steroids were added.  Since that time she has done quite well.  She has not had cardiopulmonary involvement, oral ulcers, thrombosis. In Apr 2020 she developed a heavy chest, which improved spontaneously.  She also had pain in her fingertips, although no ischemic lesions were noted.  These also improved on their own. In early Jun 2021 she reported nausea without vomiting or abdominal pain that was improving on its own.  She also noted an abnormal sensation in the fingertips and toes without pain or discoloration. Her Feb 2023 labs were notable for increased proteinuria and DNA Ab. Same was true in Mar.  A similar trend would mean more aggressive treatment with or without a kidney biopsy. \par 2.  OB history:  uncomplicated pregnancy at age 16\par 3.  Migraine: long-standing problem, although not problematic in recent times until Aug 2020 at which time she had headache for a week.  The symptoms resolved. She saw a neurologist, and an MRI was ordered. However, she was not able to tolerate confinement in the machine. \par 4.  RTC tendonitis: pain in the left shoulder that was improving in late spring 2021 with conservative measures.\par 5.  COVID Infection: In mid-Feb 2023, she developed fever and congestion secondary to COVID. These symptoms resolved, but she was left quite fatigued. Leg pain then ensued, although this has been getting better. \par 6.  DLE: lesions in ears more active in May 2023.  She tried vitamin ointment without benefit. \par \par Meds\par Probiotic\par Plaquenil 400 mg/day\par CellCept 0.5 gm/d\par Vitamin D 2000 IU/d\par \par Vaccine\par Prevnar 13 10/16/18 ([id number]; exp 2/20)\par PNVX 23 10/1/19 ([id number]; exp 3/25/21)

## 2023-05-23 NOTE — PHYSICAL EXAM
[General Appearance - Alert] : alert [General Appearance - In No Acute Distress] : in no acute distress [General Appearance - Well Nourished] : well nourished [General Appearance - Well Developed] : well developed [Sclera] : the sclera and conjunctiva were normal [Extraocular Movements] : extraocular movements were intact [Strabismus] : no strabismus was seen [Outer Ear] : the ears and nose were normal in appearance [Oropharynx] : the oropharynx was normal [Neck Appearance] : the appearance of the neck was normal [Neck Cervical Mass (___cm)] : no neck mass was observed [Jugular Venous Distention Increased] : there was no jugular-venous distention [Thyroid Diffuse Enlargement] : the thyroid was not enlarged [Heart Rate And Rhythm] : heart rate was normal and rhythm regular [Heart Sounds] : normal S1 and S2 [Heart Sounds Gallop] : no gallops [Murmurs] : no murmurs [Heart Sounds Pericardial Friction Rub] : no pericardial rub [Edema] : there was no peripheral edema [Bowel Sounds] : normal bowel sounds [Abdomen Soft] : soft [Abdomen Tenderness] : non-tender [] : no hepato-splenomegaly [Abdomen Mass (___ Cm)] : no abdominal mass palpated [Cervical Lymph Nodes Enlarged Posterior Bilaterally] : posterior cervical [Cervical Lymph Nodes Enlarged Anterior Bilaterally] : anterior cervical [Supraclavicular Lymph Nodes Enlarged Bilaterally] : supraclavicular [No CVA Tenderness] : no ~M costovertebral angle tenderness [No Spinal Tenderness] : no spinal tenderness [Abnormal Walk] : normal gait [Nail Clubbing] : no clubbing  or cyanosis of the fingernails [Musculoskeletal - Swelling] : no joint swelling seen [Motor Tone] : muscle strength and tone were normal [Skin Color & Pigmentation] : normal skin color and pigmentation [Skin Turgor] : normal skin turgor [FreeTextEntry1] : DLE lesions in ears [Sensation] : the sensory exam was normal to light touch and pinprick [Motor Exam] : the motor exam was normal [Oriented To Time, Place, And Person] : oriented to person, place, and time [Impaired Insight] : insight and judgment were intact [Affect] : the affect was normal

## 2023-05-23 NOTE — ASSESSMENT
[FreeTextEntry1] : 1.  SLE: Onset in 2012 with fatigue, fever, arthritis, and nephritis.  A kidney biopsy was performed around 2013 (results unknown).  She was treated with MMF 3 g/d and did well until 2016 at which time she had a  flare (extra-renal: malar rash, SLE in the scalp, Raynauds, alopecia).  Plaquenil and steroids were added.  Since that time she has done quite well.  She has not had cardiopulmonary involvement, oral ulcers, thrombosis. In Apr 2020 she developed a heavy chest, which improved spontaneously.  She also had pain in her fingertips, although no ischemic lesions were noted.  These also improved on their own. In early Jun 2021 she reported nausea without vomiting or abdominal pain that was improving on its own.  She also noted an abnormal sensation in the fingertips and toes without pain or discoloration. Her Feb 2023 labs were notable for increased proteinuria and DNA Ab. Same was true in Mar.  A similar trend would mean more aggressive treatment with or without a kidney biopsy. \par 2.  OB history:  uncomplicated pregnancy at age 16\par 3.  Migraine: long-standing problem, although not problematic in recent times until Aug 2020 at which time she had headache for a week.  The symptoms resolved. She saw a neurologist, and an MRI was ordered. However, she was not able to tolerate confinement in the machine. \par 4.  RTC tendonitis: pain in the left shoulder that was improving in late spring 2021 with conservative measures.\par 5.  COVID Infection: In mid-Feb 2023, she developed fever and congestion secondary to COVID. These symptoms resolved, but she was left quite fatigued. Leg pain then ensued, although this has been getting better. \par 6.  DLE: lesions in ears more active in May 2023.  She tried vitamin ointment without benefit. \par \par Plan:\par 1.  Lab tests\par 2.  Contact me one week\par 3.  Return 3 months if labs OK, but if not, kidney biopsy to be discussed\par 4.  Shingrix vaccine recommended\par 5.  BMD needed in near future\par 6.  Calcium\par 7.  Systemic Lupus Erythematosus, known as lupus, is a chronic autoimmune disease that can affect any organ in the body posing threats to proper organ function and even to life. Therefore, close surveillance of all bodily functions is required, including but not limited to central and peripheral nervous system, ocular and auditory systems, cardiopulmonary function, kidney function, mucocutaneous and musculoskeletal systems as well as constitutional manifestations. Surveillance consists of history, physical, and laboratory tests. Treatment varies, but most of the drugs used are high risk and therefore also require close monitoring in the form of blood and urine tests.\par 8.  High risk medications used in the treatment of rheumatic diseases include steroids, disease modifying agents, immunosuppressive therapies, antimalarials, biologics, and chemotherapy. Regardless of which drug or class of drug, the potential toxicities of these therapies mandate close monitoring in the form of a history, physical, and laboratory tests.\par 9.  Low potency steroid cream for ears; if ineffective, more potent cream will be prescribed. \par \par

## 2023-05-24 LAB
25(OH)D3 SERPL-MCNC: 51.4 NG/ML
ALBUMIN SERPL ELPH-MCNC: 4.2 G/DL
ALP BLD-CCNC: 101 U/L
ALT SERPL-CCNC: 80 U/L
ANION GAP SERPL CALC-SCNC: 19 MMOL/L
APPEARANCE: CLEAR
AST SERPL-CCNC: 66 U/L
BACTERIA: ABNORMAL /HPF
BILIRUB SERPL-MCNC: 0.2 MG/DL
BILIRUBIN URINE: NEGATIVE
BLOOD URINE: NEGATIVE
BUN SERPL-MCNC: 10 MG/DL
C3 SERPL-MCNC: 101 MG/DL
C4 SERPL-MCNC: 14 MG/DL
CALCIUM SERPL-MCNC: 9.6 MG/DL
CAST: 0 /LPF
CHLORIDE SERPL-SCNC: 102 MMOL/L
CK SERPL-CCNC: 84 U/L
CO2 SERPL-SCNC: 18 MMOL/L
COLOR: NORMAL
CREAT SERPL-MCNC: 0.69 MG/DL
CREAT SPEC-SCNC: 102 MG/DL
CREAT/PROT UR: 0.5 RATIO
DSDNA AB SER-ACNC: 145 IU/ML
EGFR: 107 ML/MIN/1.73M2
EPITHELIAL CELLS: 4 /HPF
GLUCOSE QUALITATIVE U: NEGATIVE MG/DL
KETONES URINE: NEGATIVE MG/DL
LEUKOCYTE ESTERASE URINE: NEGATIVE
MICROSCOPIC-UA: NORMAL
NITRITE URINE: NEGATIVE
PH URINE: 6.5
POTASSIUM SERPL-SCNC: 4.3 MMOL/L
PROT SERPL-MCNC: 7.3 G/DL
PROT UR-MCNC: 46 MG/DL
PROTEIN URINE: 100 MG/DL
RED BLOOD CELLS URINE: 1 /HPF
SODIUM SERPL-SCNC: 140 MMOL/L
SPECIFIC GRAVITY URINE: 1.02
UROBILINOGEN URINE: 0.2 MG/DL
WHITE BLOOD CELLS URINE: 2 /HPF

## 2023-05-25 LAB
ENA RNP AB SER IA-ACNC: 3 AL
ENA SM AB SER IA-ACNC: >8 AL
ENA SS-A AB SER IA-ACNC: <0.2 AL
ENA SS-B AB SER IA-ACNC: <0.2 AL

## 2023-05-26 LAB — HYDROXYCHLOROQUINE CONCENTRATION: 1229 NG/ML

## 2023-06-27 ENCOUNTER — APPOINTMENT (OUTPATIENT)
Dept: OBGYN | Facility: CLINIC | Age: 48
End: 2023-06-27
Payer: COMMERCIAL

## 2023-06-27 VITALS
HEIGHT: 63 IN | SYSTOLIC BLOOD PRESSURE: 132 MMHG | BODY MASS INDEX: 26.58 KG/M2 | DIASTOLIC BLOOD PRESSURE: 70 MMHG | WEIGHT: 150 LBS

## 2023-06-27 DIAGNOSIS — R63.5 ABNORMAL WEIGHT GAIN: ICD-10-CM

## 2023-06-27 DIAGNOSIS — Z86.19 PERSONAL HISTORY OF OTHER INFECTIOUS AND PARASITIC DISEASES: ICD-10-CM

## 2023-06-27 DIAGNOSIS — Z01.419 ENCOUNTER FOR GYNECOLOGICAL EXAMINATION (GENERAL) (ROUTINE) W/OUT ABNORMAL FINDINGS: ICD-10-CM

## 2023-06-27 PROCEDURE — 99396 PREV VISIT EST AGE 40-64: CPT

## 2023-06-27 NOTE — HISTORY OF PRESENT ILLNESS
[FreeTextEntry1] : 47yo   LMP at time of hysterectomy 2017 here for annual exam.  Pt reports difficulty with weight loss despite best efforts w/diet and exercise.\par \par  x 1\par TOP x2 (w/D&C) [No] : Patient does not have concerns regarding sex [Currently Active] : currently active

## 2023-06-27 NOTE — COUNSELING
[Nutrition/ Exercise/ Weight Management] : nutrition, exercise, weight management [Vitamins/Supplements] : vitamins/supplements 9

## 2023-06-27 NOTE — DISCUSSION/SUMMARY
[FreeTextEntry1] : - Pt s/p total hysterectomy, no indication for vaginal pap smears\par - Mammo/Sono requisition given by PCP\par - Colonoscopy screening up to date\par - Med Weight Management referral rendered secondary to pt reporting difficulty w/weight loss\par

## 2023-07-25 ENCOUNTER — APPOINTMENT (OUTPATIENT)
Dept: RHEUMATOLOGY | Facility: CLINIC | Age: 48
End: 2023-07-25
Payer: COMMERCIAL

## 2023-07-25 VITALS
TEMPERATURE: 97.1 F | DIASTOLIC BLOOD PRESSURE: 74 MMHG | HEIGHT: 63 IN | HEART RATE: 75 BPM | BODY MASS INDEX: 26.58 KG/M2 | SYSTOLIC BLOOD PRESSURE: 107 MMHG | RESPIRATION RATE: 16 BRPM | WEIGHT: 150 LBS | OXYGEN SATURATION: 98 %

## 2023-07-25 DIAGNOSIS — R79.89 OTHER SPECIFIED ABNORMAL FINDINGS OF BLOOD CHEMISTRY: ICD-10-CM

## 2023-07-25 PROCEDURE — 99214 OFFICE O/P EST MOD 30 MIN: CPT

## 2023-07-25 NOTE — ED PROVIDER NOTE - NS ED ATTENDING STATEMENT MOD
Attending with Low Dose Naltrexone Counseling- I discussed with the patient the potential risks and side effects of low dose naltrexone including but not limited to: more vivid dreams, headaches, nausea, vomiting, abdominal pain, fatigue, dizziness, and anxiety.

## 2023-07-26 ENCOUNTER — APPOINTMENT (OUTPATIENT)
Dept: OBGYN | Facility: CLINIC | Age: 48
End: 2023-07-26
Payer: COMMERCIAL

## 2023-07-26 ENCOUNTER — NON-APPOINTMENT (OUTPATIENT)
Age: 48
End: 2023-07-26

## 2023-07-26 VITALS
DIASTOLIC BLOOD PRESSURE: 60 MMHG | SYSTOLIC BLOOD PRESSURE: 120 MMHG | BODY MASS INDEX: 26.93 KG/M2 | HEIGHT: 63 IN | WEIGHT: 152 LBS

## 2023-07-26 LAB
ALBUMIN SERPL ELPH-MCNC: 4.1 G/DL
ALP BLD-CCNC: 103 U/L
ALT SERPL-CCNC: 51 U/L
ANION GAP SERPL CALC-SCNC: 13 MMOL/L
APPEARANCE: CLEAR
AST SERPL-CCNC: 45 U/L
BACTERIA: ABNORMAL /HPF
BILIRUB SERPL-MCNC: 0.2 MG/DL
BILIRUBIN URINE: NEGATIVE
BLOOD URINE: NEGATIVE
BUN SERPL-MCNC: 9 MG/DL
C3 SERPL-MCNC: 99 MG/DL
C4 SERPL-MCNC: 11 MG/DL
CALCIUM SERPL-MCNC: 9.4 MG/DL
CAST: 0 /LPF
CHLORIDE SERPL-SCNC: 103 MMOL/L
CK SERPL-CCNC: 85 U/L
CO2 SERPL-SCNC: 22 MMOL/L
COLOR: YELLOW
CREAT SERPL-MCNC: 0.62 MG/DL
CREAT SPEC-SCNC: 137 MG/DL
CREAT/PROT UR: 0.3 RATIO
DSDNA AB SER-ACNC: 210 IU/ML
EGFR: 109 ML/MIN/1.73M2
EPITHELIAL CELLS: 14 /HPF
GLUCOSE QUALITATIVE U: NEGATIVE MG/DL
KETONES URINE: NEGATIVE MG/DL
LEUKOCYTE ESTERASE URINE: NEGATIVE
MICROSCOPIC-UA: NORMAL
NITRITE URINE: NEGATIVE
PH URINE: 6
POTASSIUM SERPL-SCNC: 4.1 MMOL/L
PROT SERPL-MCNC: 7.3 G/DL
PROT UR-MCNC: 45 MG/DL
PROTEIN URINE: 100 MG/DL
RED BLOOD CELLS URINE: 2 /HPF
SODIUM SERPL-SCNC: 137 MMOL/L
SPECIFIC GRAVITY URINE: 1.02
UROBILINOGEN URINE: 0.2 MG/DL
WHITE BLOOD CELLS URINE: 1 /HPF

## 2023-07-26 PROCEDURE — 99213 OFFICE O/P EST LOW 20 MIN: CPT

## 2023-07-26 NOTE — PHYSICAL EXAM
[Appropriately responsive] : appropriately responsive [Alert] : alert [No Acute Distress] : no acute distress [Soft] : soft [Non-tender] : non-tender [Non-distended] : non-distended [No HSM] : No HSM [No Lesions] : no lesions [No Mass] : no mass [Oriented x3] : oriented x3 [Labia Majora] : normal [Labia Minora] : normal [Discharge] : a  ~M vaginal discharge was present [Moderate] : moderate [Normal] : normal [Uterine Adnexae] : normal

## 2023-07-27 PROBLEM — R79.89 ABNORMAL LFTS: Status: ACTIVE | Noted: 2023-07-27

## 2023-07-27 NOTE — ASSESSMENT
[FreeTextEntry1] : 1.  SLE: Onset in 2012 with fatigue, fever, arthritis, and nephritis.  A kidney biopsy was performed around 2013 (results unknown).  She was treated with MMF 3 g/d and did well until 2016 at which time she had a  flare (extra-renal: malar rash, SLE in the scalp, Raynauds, alopecia).  Plaquenil and steroids were added.  Since that time she has done quite well.  She has not had cardiopulmonary involvement, oral ulcers, thrombosis. In Apr 2020 she developed a heavy chest, which improved spontaneously.  She also had pain in her fingertips, although no ischemic lesions were noted.  These also improved on their own. In early Jun 2021 she reported nausea without vomiting or abdominal pain that was improving on its own.  She also noted an abnormal sensation in the fingertips and toes without pain or discoloration. Her Feb 2023 labs were notable for increased proteinuria and DNA Ab. Same was true in Mar.  A similar trend would mean more aggressive treatment with or without a kidney biopsy. However, labs improved towards mid-2023. \par 2.  OB history:  uncomplicated pregnancy at age 16\par 3.  Migraine: long-standing problem, although not problematic in recent times until Aug 2020 at which time she had headache for a week.  The symptoms resolved. She saw a neurologist, and an MRI was ordered. However, she was not able to tolerate confinement in the machine. \par 4.  RTC tendonitis: pain in the left shoulder that was improving in late spring 2021 with conservative measures.\par 5.  COVID Infection: In mid-Feb 2023, she developed fever and congestion secondary to COVID. These symptoms resolved, but she was left quite fatigued. Leg pain then ensued, although this has been getting better. \par 6.  DLE: lesions in ears more active in May 2023.  She tried vitamin ointment without benefit. Eventually there was some improvement. \par 7.  Abnormal LFTs: noted in 2023; unclear origin but consider anti-fungal med administered for toenail fungus.\par \par Plan:\par 1.  Lab tests\par 2.  Contact me one week\par 3.  Return 3 months if labs OK, but if not, kidney biopsy to be discussed\par 4.  Shingrix vaccine recommended\par 5.  BMD needed in near future\par 6.  Calcium\par 7.  Systemic Lupus Erythematosus, known as lupus, is a chronic autoimmune disease that can affect any organ in the body posing threats to proper organ function and even to life. Therefore, close surveillance of all bodily functions is required, including but not limited to central and peripheral nervous system, ocular and auditory systems, cardiopulmonary function, kidney function, mucocutaneous and musculoskeletal systems as well as constitutional manifestations. Surveillance consists of history, physical, and laboratory tests. Treatment varies, but most of the drugs used are high risk and therefore also require close monitoring in the form of blood and urine tests.\par 8.  High risk medications used in the treatment of rheumatic diseases include steroids, disease modifying agents, immunosuppressive therapies, antimalarials, biologics, and chemotherapy. Regardless of which drug or class of drug, the potential toxicities of these therapies mandate close monitoring in the form of a history, physical, and laboratory tests.\par 9.  Low potency steroid cream for ears; if ineffective, more potent cream will be prescribed. \par \par

## 2023-07-27 NOTE — PHYSICAL EXAM
[General Appearance - Alert] : alert [General Appearance - In No Acute Distress] : in no acute distress [General Appearance - Well Nourished] : well nourished [General Appearance - Well Developed] : well developed [Sclera] : the sclera and conjunctiva were normal [Extraocular Movements] : extraocular movements were intact [Outer Ear] : the ears and nose were normal in appearance [Strabismus] : no strabismus was seen [Oropharynx] : the oropharynx was normal [Neck Appearance] : the appearance of the neck was normal [Neck Cervical Mass (___cm)] : no neck mass was observed [Jugular Venous Distention Increased] : there was no jugular-venous distention [Thyroid Diffuse Enlargement] : the thyroid was not enlarged [Heart Sounds] : normal S1 and S2 [Heart Rate And Rhythm] : heart rate was normal and rhythm regular [Heart Sounds Gallop] : no gallops [Murmurs] : no murmurs [Heart Sounds Pericardial Friction Rub] : no pericardial rub [Edema] : there was no peripheral edema [Abdomen Soft] : soft [Bowel Sounds] : normal bowel sounds [Abdomen Tenderness] : non-tender [] : no hepato-splenomegaly [Abdomen Mass (___ Cm)] : no abdominal mass palpated [Cervical Lymph Nodes Enlarged Anterior Bilaterally] : anterior cervical [Cervical Lymph Nodes Enlarged Posterior Bilaterally] : posterior cervical [Supraclavicular Lymph Nodes Enlarged Bilaterally] : supraclavicular [No CVA Tenderness] : no ~M costovertebral angle tenderness [No Spinal Tenderness] : no spinal tenderness [Abnormal Walk] : normal gait [Nail Clubbing] : no clubbing  or cyanosis of the fingernails [Musculoskeletal - Swelling] : no joint swelling seen [Motor Tone] : muscle strength and tone were normal [Skin Color & Pigmentation] : normal skin color and pigmentation [Skin Turgor] : normal skin turgor [FreeTextEntry1] : DLE lesions in ears [Sensation] : the sensory exam was normal to light touch and pinprick [Motor Exam] : the motor exam was normal [Oriented To Time, Place, And Person] : oriented to person, place, and time [Impaired Insight] : insight and judgment were intact [Affect] : the affect was normal

## 2023-07-28 LAB
C TRACH RRNA SPEC QL NAA+PROBE: NOT DETECTED
CANDIDA VAG CYTO: NOT DETECTED
G VAGINALIS+PREV SP MTYP VAG QL MICRO: DETECTED
N GONORRHOEA RRNA SPEC QL NAA+PROBE: NOT DETECTED
SOURCE AMPLIFICATION: NORMAL
SOURCE AMPLIFICATION: NORMAL
T VAGINALIS RRNA SPEC QL NAA+PROBE: NOT DETECTED
T VAGINALIS VAG QL WET PREP: NOT DETECTED

## 2023-08-28 ENCOUNTER — APPOINTMENT (OUTPATIENT)
Dept: ULTRASOUND IMAGING | Facility: IMAGING CENTER | Age: 48
End: 2023-08-28
Payer: COMMERCIAL

## 2023-08-28 ENCOUNTER — RESULT REVIEW (OUTPATIENT)
Age: 48
End: 2023-08-28

## 2023-08-28 ENCOUNTER — APPOINTMENT (OUTPATIENT)
Dept: MAMMOGRAPHY | Facility: IMAGING CENTER | Age: 48
End: 2023-08-28
Payer: COMMERCIAL

## 2023-08-28 ENCOUNTER — OUTPATIENT (OUTPATIENT)
Dept: OUTPATIENT SERVICES | Facility: HOSPITAL | Age: 48
LOS: 1 days | End: 2023-08-28
Payer: COMMERCIAL

## 2023-08-28 DIAGNOSIS — Z00.8 ENCOUNTER FOR OTHER GENERAL EXAMINATION: ICD-10-CM

## 2023-08-28 PROCEDURE — 76641 ULTRASOUND BREAST COMPLETE: CPT | Mod: 26,50

## 2023-08-28 PROCEDURE — 77063 BREAST TOMOSYNTHESIS BI: CPT

## 2023-08-28 PROCEDURE — 77063 BREAST TOMOSYNTHESIS BI: CPT | Mod: 26

## 2023-08-28 PROCEDURE — 77067 SCR MAMMO BI INCL CAD: CPT | Mod: 26

## 2023-08-28 PROCEDURE — 77067 SCR MAMMO BI INCL CAD: CPT

## 2023-08-28 PROCEDURE — 76641 ULTRASOUND BREAST COMPLETE: CPT

## 2023-09-05 DIAGNOSIS — Z87.42 PERSONAL HISTORY OF OTHER DISEASES OF THE FEMALE GENITAL TRACT: ICD-10-CM

## 2023-09-05 DIAGNOSIS — N89.8 OTHER SPECIFIED NONINFLAMMATORY DISORDERS OF VAGINA: ICD-10-CM

## 2023-09-12 ENCOUNTER — APPOINTMENT (OUTPATIENT)
Dept: OBGYN | Facility: CLINIC | Age: 48
End: 2023-09-12

## 2023-10-26 ENCOUNTER — OUTPATIENT (OUTPATIENT)
Dept: OUTPATIENT SERVICES | Facility: HOSPITAL | Age: 48
LOS: 1 days | End: 2023-10-26
Payer: COMMERCIAL

## 2023-10-26 ENCOUNTER — APPOINTMENT (OUTPATIENT)
Dept: RADIOLOGY | Facility: IMAGING CENTER | Age: 48
End: 2023-10-26
Payer: COMMERCIAL

## 2023-10-26 ENCOUNTER — APPOINTMENT (OUTPATIENT)
Dept: ULTRASOUND IMAGING | Facility: IMAGING CENTER | Age: 48
End: 2023-10-26
Payer: COMMERCIAL

## 2023-10-26 ENCOUNTER — EMERGENCY (EMERGENCY)
Facility: HOSPITAL | Age: 48
LOS: 1 days | Discharge: HOME | End: 2023-10-26
Admitting: EMERGENCY MEDICINE
Payer: COMMERCIAL

## 2023-10-26 VITALS
OXYGEN SATURATION: 100 % | TEMPERATURE: 98 F | DIASTOLIC BLOOD PRESSURE: 73 MMHG | RESPIRATION RATE: 20 BRPM | SYSTOLIC BLOOD PRESSURE: 132 MMHG | HEART RATE: 80 BPM

## 2023-10-26 DIAGNOSIS — Z00.8 ENCOUNTER FOR OTHER GENERAL EXAMINATION: ICD-10-CM

## 2023-10-26 PROCEDURE — 99284 EMERGENCY DEPT VISIT MOD MDM: CPT

## 2023-10-26 PROCEDURE — 77085 DXA BONE DENSITY AXL VRT FX: CPT

## 2023-10-26 PROCEDURE — 93010 ELECTROCARDIOGRAM REPORT: CPT

## 2023-10-26 PROCEDURE — 76536 US EXAM OF HEAD AND NECK: CPT

## 2023-10-26 PROCEDURE — 76536 US EXAM OF HEAD AND NECK: CPT | Mod: 26

## 2023-10-26 PROCEDURE — 77085 DXA BONE DENSITY AXL VRT FX: CPT | Mod: 26

## 2023-10-26 RX ORDER — MECLIZINE HCL 12.5 MG
25 TABLET ORAL ONCE
Refills: 0 | Status: COMPLETED | OUTPATIENT
Start: 2023-10-26 | End: 2023-10-26

## 2023-10-26 RX ORDER — METOCLOPRAMIDE HCL 10 MG
10 TABLET ORAL ONCE
Refills: 0 | Status: COMPLETED | OUTPATIENT
Start: 2023-10-26 | End: 2023-10-26

## 2023-10-26 RX ORDER — SODIUM CHLORIDE 9 MG/ML
1000 INJECTION INTRAMUSCULAR; INTRAVENOUS; SUBCUTANEOUS ONCE
Refills: 0 | Status: COMPLETED | OUTPATIENT
Start: 2023-10-26 | End: 2023-10-26

## 2023-10-27 VITALS
SYSTOLIC BLOOD PRESSURE: 115 MMHG | RESPIRATION RATE: 20 BRPM | TEMPERATURE: 98 F | HEART RATE: 77 BPM | DIASTOLIC BLOOD PRESSURE: 65 MMHG | OXYGEN SATURATION: 98 %

## 2023-10-27 LAB
ALBUMIN SERPL ELPH-MCNC: 4 G/DL — SIGNIFICANT CHANGE UP (ref 3.3–5)
ALBUMIN SERPL ELPH-MCNC: 4 G/DL — SIGNIFICANT CHANGE UP (ref 3.3–5)
ALP SERPL-CCNC: 103 U/L — SIGNIFICANT CHANGE UP (ref 40–120)
ALP SERPL-CCNC: 103 U/L — SIGNIFICANT CHANGE UP (ref 40–120)
ALT FLD-CCNC: 16 U/L — SIGNIFICANT CHANGE UP (ref 4–33)
ALT FLD-CCNC: 16 U/L — SIGNIFICANT CHANGE UP (ref 4–33)
ANION GAP SERPL CALC-SCNC: 10 MMOL/L — SIGNIFICANT CHANGE UP (ref 7–14)
ANION GAP SERPL CALC-SCNC: 10 MMOL/L — SIGNIFICANT CHANGE UP (ref 7–14)
AST SERPL-CCNC: 24 U/L — SIGNIFICANT CHANGE UP (ref 4–32)
AST SERPL-CCNC: 24 U/L — SIGNIFICANT CHANGE UP (ref 4–32)
BASOPHILS # BLD AUTO: 0.03 K/UL — SIGNIFICANT CHANGE UP (ref 0–0.2)
BASOPHILS # BLD AUTO: 0.03 K/UL — SIGNIFICANT CHANGE UP (ref 0–0.2)
BASOPHILS NFR BLD AUTO: 0.5 % — SIGNIFICANT CHANGE UP (ref 0–2)
BASOPHILS NFR BLD AUTO: 0.5 % — SIGNIFICANT CHANGE UP (ref 0–2)
BILIRUB SERPL-MCNC: <0.2 MG/DL — SIGNIFICANT CHANGE UP (ref 0.2–1.2)
BILIRUB SERPL-MCNC: <0.2 MG/DL — SIGNIFICANT CHANGE UP (ref 0.2–1.2)
BUN SERPL-MCNC: 9 MG/DL — SIGNIFICANT CHANGE UP (ref 7–23)
BUN SERPL-MCNC: 9 MG/DL — SIGNIFICANT CHANGE UP (ref 7–23)
CALCIUM SERPL-MCNC: 9.7 MG/DL — SIGNIFICANT CHANGE UP (ref 8.4–10.5)
CALCIUM SERPL-MCNC: 9.7 MG/DL — SIGNIFICANT CHANGE UP (ref 8.4–10.5)
CHLORIDE SERPL-SCNC: 105 MMOL/L — SIGNIFICANT CHANGE UP (ref 98–107)
CHLORIDE SERPL-SCNC: 105 MMOL/L — SIGNIFICANT CHANGE UP (ref 98–107)
CO2 SERPL-SCNC: 27 MMOL/L — SIGNIFICANT CHANGE UP (ref 22–31)
CO2 SERPL-SCNC: 27 MMOL/L — SIGNIFICANT CHANGE UP (ref 22–31)
CREAT SERPL-MCNC: 0.62 MG/DL — SIGNIFICANT CHANGE UP (ref 0.5–1.3)
CREAT SERPL-MCNC: 0.62 MG/DL — SIGNIFICANT CHANGE UP (ref 0.5–1.3)
EGFR: 109 ML/MIN/1.73M2 — SIGNIFICANT CHANGE UP
EGFR: 109 ML/MIN/1.73M2 — SIGNIFICANT CHANGE UP
EOSINOPHIL # BLD AUTO: 0.12 K/UL — SIGNIFICANT CHANGE UP (ref 0–0.5)
EOSINOPHIL # BLD AUTO: 0.12 K/UL — SIGNIFICANT CHANGE UP (ref 0–0.5)
EOSINOPHIL NFR BLD AUTO: 2 % — SIGNIFICANT CHANGE UP (ref 0–6)
EOSINOPHIL NFR BLD AUTO: 2 % — SIGNIFICANT CHANGE UP (ref 0–6)
GLUCOSE SERPL-MCNC: 101 MG/DL — HIGH (ref 70–99)
GLUCOSE SERPL-MCNC: 101 MG/DL — HIGH (ref 70–99)
HCT VFR BLD CALC: 35.9 % — SIGNIFICANT CHANGE UP (ref 34.5–45)
HCT VFR BLD CALC: 35.9 % — SIGNIFICANT CHANGE UP (ref 34.5–45)
HGB BLD-MCNC: 12.3 G/DL — SIGNIFICANT CHANGE UP (ref 11.5–15.5)
HGB BLD-MCNC: 12.3 G/DL — SIGNIFICANT CHANGE UP (ref 11.5–15.5)
IANC: 2.59 K/UL — SIGNIFICANT CHANGE UP (ref 1.8–7.4)
IANC: 2.59 K/UL — SIGNIFICANT CHANGE UP (ref 1.8–7.4)
IMM GRANULOCYTES NFR BLD AUTO: 0.7 % — SIGNIFICANT CHANGE UP (ref 0–0.9)
IMM GRANULOCYTES NFR BLD AUTO: 0.7 % — SIGNIFICANT CHANGE UP (ref 0–0.9)
LYMPHOCYTES # BLD AUTO: 2.62 K/UL — SIGNIFICANT CHANGE UP (ref 1–3.3)
LYMPHOCYTES # BLD AUTO: 2.62 K/UL — SIGNIFICANT CHANGE UP (ref 1–3.3)
LYMPHOCYTES # BLD AUTO: 44.6 % — HIGH (ref 13–44)
LYMPHOCYTES # BLD AUTO: 44.6 % — HIGH (ref 13–44)
MCHC RBC-ENTMCNC: 28.7 PG — SIGNIFICANT CHANGE UP (ref 27–34)
MCHC RBC-ENTMCNC: 28.7 PG — SIGNIFICANT CHANGE UP (ref 27–34)
MCHC RBC-ENTMCNC: 34.3 GM/DL — SIGNIFICANT CHANGE UP (ref 32–36)
MCHC RBC-ENTMCNC: 34.3 GM/DL — SIGNIFICANT CHANGE UP (ref 32–36)
MCV RBC AUTO: 83.7 FL — SIGNIFICANT CHANGE UP (ref 80–100)
MCV RBC AUTO: 83.7 FL — SIGNIFICANT CHANGE UP (ref 80–100)
MONOCYTES # BLD AUTO: 0.47 K/UL — SIGNIFICANT CHANGE UP (ref 0–0.9)
MONOCYTES # BLD AUTO: 0.47 K/UL — SIGNIFICANT CHANGE UP (ref 0–0.9)
MONOCYTES NFR BLD AUTO: 8 % — SIGNIFICANT CHANGE UP (ref 2–14)
MONOCYTES NFR BLD AUTO: 8 % — SIGNIFICANT CHANGE UP (ref 2–14)
NEUTROPHILS # BLD AUTO: 2.59 K/UL — SIGNIFICANT CHANGE UP (ref 1.8–7.4)
NEUTROPHILS # BLD AUTO: 2.59 K/UL — SIGNIFICANT CHANGE UP (ref 1.8–7.4)
NEUTROPHILS NFR BLD AUTO: 44.2 % — SIGNIFICANT CHANGE UP (ref 43–77)
NEUTROPHILS NFR BLD AUTO: 44.2 % — SIGNIFICANT CHANGE UP (ref 43–77)
NRBC # BLD: 0 /100 WBCS — SIGNIFICANT CHANGE UP (ref 0–0)
NRBC # BLD: 0 /100 WBCS — SIGNIFICANT CHANGE UP (ref 0–0)
NRBC # FLD: 0.02 K/UL — HIGH (ref 0–0)
NRBC # FLD: 0.02 K/UL — HIGH (ref 0–0)
PLATELET # BLD AUTO: 188 K/UL — SIGNIFICANT CHANGE UP (ref 150–400)
PLATELET # BLD AUTO: 188 K/UL — SIGNIFICANT CHANGE UP (ref 150–400)
POTASSIUM SERPL-MCNC: 4.7 MMOL/L — SIGNIFICANT CHANGE UP (ref 3.5–5.3)
POTASSIUM SERPL-MCNC: 4.7 MMOL/L — SIGNIFICANT CHANGE UP (ref 3.5–5.3)
POTASSIUM SERPL-SCNC: 4.7 MMOL/L — SIGNIFICANT CHANGE UP (ref 3.5–5.3)
POTASSIUM SERPL-SCNC: 4.7 MMOL/L — SIGNIFICANT CHANGE UP (ref 3.5–5.3)
PROT SERPL-MCNC: 7.6 G/DL — SIGNIFICANT CHANGE UP (ref 6–8.3)
PROT SERPL-MCNC: 7.6 G/DL — SIGNIFICANT CHANGE UP (ref 6–8.3)
RBC # BLD: 4.29 M/UL — SIGNIFICANT CHANGE UP (ref 3.8–5.2)
RBC # BLD: 4.29 M/UL — SIGNIFICANT CHANGE UP (ref 3.8–5.2)
RBC # FLD: 12.6 % — SIGNIFICANT CHANGE UP (ref 10.3–14.5)
RBC # FLD: 12.6 % — SIGNIFICANT CHANGE UP (ref 10.3–14.5)
SODIUM SERPL-SCNC: 142 MMOL/L — SIGNIFICANT CHANGE UP (ref 135–145)
SODIUM SERPL-SCNC: 142 MMOL/L — SIGNIFICANT CHANGE UP (ref 135–145)
WBC # BLD: 5.87 K/UL — SIGNIFICANT CHANGE UP (ref 3.8–10.5)
WBC # BLD: 5.87 K/UL — SIGNIFICANT CHANGE UP (ref 3.8–10.5)
WBC # FLD AUTO: 5.87 K/UL — SIGNIFICANT CHANGE UP (ref 3.8–10.5)
WBC # FLD AUTO: 5.87 K/UL — SIGNIFICANT CHANGE UP (ref 3.8–10.5)

## 2023-10-27 RX ORDER — MECLIZINE HCL 12.5 MG
1 TABLET ORAL
Qty: 30 | Refills: 0
Start: 2023-10-27

## 2023-10-27 RX ADMIN — Medication 25 MILLIGRAM(S): at 00:48

## 2023-10-27 RX ADMIN — Medication 10 MILLIGRAM(S): at 00:47

## 2023-10-27 RX ADMIN — SODIUM CHLORIDE 1000 MILLILITER(S): 9 INJECTION INTRAMUSCULAR; INTRAVENOUS; SUBCUTANEOUS at 00:46

## 2023-10-27 NOTE — ED ADULT NURSE NOTE - OBJECTIVE STATEMENT
A&Ox4. Past medical history of lupus and vertigo. Patient presents with c/o dizziness since Saturday. Denies SOB, CP, or N/V/D. Respirations even and unlabored. 22 gauge IV placed in left AC. Labs obtained. Medications given as per current care plan. Safety precautions in place.

## 2023-10-27 NOTE — ED PROVIDER NOTE - ADDITIONAL NOTES AND INSTRUCTIONS:
Please excuse the absence of Ms. Emanuel. She was evaluated in the Emergency Room. She may return to work on 10/30/2023.

## 2023-10-27 NOTE — ED PROVIDER NOTE - PATIENT PORTAL LINK FT
You can access the FollowMyHealth Patient Portal offered by [facility] by registering at the following website: [email or web address]. By joining [facility]’s FollowMyHealth portal, you will also be able to view your health information using other applications (apps) compatible with our system.

## 2023-10-27 NOTE — ED PROVIDER NOTE - OBJECTIVE STATEMENT
Patient is a 48-year-old female, past medical history of lupus, vertigo presenting with a complaint of vertiginous-like dizziness x3 days that is aggravated with head movement.  No associated fever, chills, neck stiffness, tinnitus, otorrhea.  Patient tried to get an appointment with ENT specialist however was unsuccessful

## 2023-10-27 NOTE — ED PROVIDER NOTE - CLINICAL SUMMARY MEDICAL DECISION MAKING FREE TEXT BOX
Patient is a 48-year-old female, past medical history of lupus, vertigo presenting with a complaint of vertiginous-like dizziness x3. On presentation patient well-appearing, vital stable, on exam no acute neurological findings.  Symptoms likely due to vertigo.  Will treat patient with IV fluids, meclizine, Reglan and reassessment.

## 2023-10-27 NOTE — ED ADULT NURSE NOTE - NSFALLUNIVINTERV_ED_ALL_ED
Bed/Stretcher in lowest position, wheels locked, appropriate side rails in place/Call bell, personal items and telephone in reach/Instruct patient to call for assistance before getting out of bed/chair/stretcher/Non-slip footwear applied when patient is off stretcher/[place] to call system/Physically safe environment - no spills, clutter or unnecessary equipment/Purposeful proactive rounding/Room/bathroom lighting operational, light cord in reach

## 2023-10-31 ENCOUNTER — APPOINTMENT (OUTPATIENT)
Dept: OTOLARYNGOLOGY | Facility: CLINIC | Age: 48
End: 2023-10-31
Payer: COMMERCIAL

## 2023-10-31 VITALS
SYSTOLIC BLOOD PRESSURE: 111 MMHG | WEIGHT: 155 LBS | HEIGHT: 63 IN | BODY MASS INDEX: 27.46 KG/M2 | DIASTOLIC BLOOD PRESSURE: 77 MMHG | HEART RATE: 82 BPM

## 2023-10-31 PROCEDURE — 99204 OFFICE O/P NEW MOD 45 MIN: CPT | Mod: 25

## 2023-10-31 PROCEDURE — 95992 CANALITH REPOSITIONING PROC: CPT

## 2023-10-31 RX ORDER — MULTIVITAMIN
TABLET ORAL
Refills: 0 | Status: ACTIVE | COMMUNITY

## 2023-10-31 RX ORDER — CHROMIUM 200 MCG
TABLET ORAL
Refills: 0 | Status: ACTIVE | COMMUNITY

## 2023-10-31 RX ORDER — PNV NO.95/FERROUS FUM/FOLIC AC 28MG-0.8MG
TABLET ORAL
Refills: 0 | Status: ACTIVE | COMMUNITY

## 2023-10-31 RX ORDER — ASCORBIC ACID 500 MG
TABLET ORAL
Refills: 0 | Status: ACTIVE | COMMUNITY

## 2023-10-31 RX ORDER — COLD-HOT PACK
EACH MISCELLANEOUS
Refills: 0 | Status: ACTIVE | COMMUNITY

## 2023-10-31 RX ORDER — METRONIDAZOLE 7.5 MG/G
0.75 GEL VAGINAL
Qty: 1 | Refills: 0 | Status: COMPLETED | COMMUNITY
Start: 2023-09-05 | End: 2023-10-31

## 2023-10-31 RX ORDER — OMEGA-3/DHA/EPA/FISH OIL 300-1000MG
CAPSULE ORAL
Refills: 0 | Status: ACTIVE | COMMUNITY

## 2023-11-06 ENCOUNTER — APPOINTMENT (OUTPATIENT)
Dept: RHEUMATOLOGY | Facility: CLINIC | Age: 48
End: 2023-11-06
Payer: COMMERCIAL

## 2023-11-06 VITALS
DIASTOLIC BLOOD PRESSURE: 84 MMHG | HEART RATE: 103 BPM | TEMPERATURE: 98.2 F | SYSTOLIC BLOOD PRESSURE: 131 MMHG | WEIGHT: 155 LBS | HEIGHT: 63 IN | RESPIRATION RATE: 16 BRPM | OXYGEN SATURATION: 98 % | BODY MASS INDEX: 27.46 KG/M2

## 2023-11-06 PROCEDURE — 99214 OFFICE O/P EST MOD 30 MIN: CPT

## 2023-11-07 LAB
ALBUMIN SERPL ELPH-MCNC: 4.2 G/DL
ALP BLD-CCNC: 118 U/L
ALT SERPL-CCNC: 25 U/L
ANION GAP SERPL CALC-SCNC: 13 MMOL/L
APPEARANCE: CLEAR
AST SERPL-CCNC: 30 U/L
BACTERIA: NEGATIVE /HPF
BASOPHILS # BLD AUTO: 0.03 K/UL
BASOPHILS NFR BLD AUTO: 0.5 %
BILIRUB SERPL-MCNC: 0.2 MG/DL
BILIRUBIN URINE: NEGATIVE
BLOOD URINE: NEGATIVE
BUN SERPL-MCNC: 11 MG/DL
C3 SERPL-MCNC: 124 MG/DL
C4 SERPL-MCNC: 14 MG/DL
CALCIUM SERPL-MCNC: 10.1 MG/DL
CAST: 0 /LPF
CHLORIDE SERPL-SCNC: 105 MMOL/L
CK SERPL-CCNC: 58 U/L
CO2 SERPL-SCNC: 24 MMOL/L
COLOR: YELLOW
CREAT SERPL-MCNC: 0.61 MG/DL
CREAT SPEC-SCNC: 12 MG/DL
CREAT/PROT UR: 0.6 RATIO
DSDNA AB SER-ACNC: 634 IU/ML
EGFR: 110 ML/MIN/1.73M2
EOSINOPHIL # BLD AUTO: 0.17 K/UL
EOSINOPHIL NFR BLD AUTO: 2.7 %
EPITHELIAL CELLS: 0 /HPF
GLUCOSE QUALITATIVE U: NEGATIVE MG/DL
HCT VFR BLD CALC: 36.5 %
HGB BLD-MCNC: 11.9 G/DL
IMM GRANULOCYTES NFR BLD AUTO: 0.2 %
KETONES URINE: NEGATIVE MG/DL
LEUKOCYTE ESTERASE URINE: NEGATIVE
LYMPHOCYTES # BLD AUTO: 3.14 K/UL
LYMPHOCYTES NFR BLD AUTO: 50.6 %
MAN DIFF?: NORMAL
MCHC RBC-ENTMCNC: 28.1 PG
MCHC RBC-ENTMCNC: 32.6 GM/DL
MCV RBC AUTO: 86.3 FL
MICROSCOPIC-UA: NORMAL
MONOCYTES # BLD AUTO: 0.43 K/UL
MONOCYTES NFR BLD AUTO: 6.9 %
NEUTROPHILS # BLD AUTO: 2.43 K/UL
NEUTROPHILS NFR BLD AUTO: 39.1 %
NITRITE URINE: NEGATIVE
PH URINE: 6.5
PLATELET # BLD AUTO: 197 K/UL
POTASSIUM SERPL-SCNC: 4.4 MMOL/L
PROT SERPL-MCNC: 7.8 G/DL
PROT UR-MCNC: 8 MG/DL
PROTEIN URINE: NEGATIVE MG/DL
RBC # BLD: 4.23 M/UL
RBC # FLD: 12.9 %
RED BLOOD CELLS URINE: 1 /HPF
SODIUM SERPL-SCNC: 142 MMOL/L
SPECIFIC GRAVITY URINE: 1
UROBILINOGEN URINE: 0.2 MG/DL
WBC # FLD AUTO: 6.21 K/UL
WHITE BLOOD CELLS URINE: 0 /HPF

## 2023-11-10 LAB — HYDROXYCHLOROQUINE CONCENTRATION: 1380 NG/ML

## 2023-11-29 ENCOUNTER — APPOINTMENT (OUTPATIENT)
Dept: PAIN MANAGEMENT | Facility: CLINIC | Age: 48
End: 2023-11-29
Payer: COMMERCIAL

## 2023-11-29 VITALS
HEART RATE: 99 BPM | WEIGHT: 155 LBS | SYSTOLIC BLOOD PRESSURE: 135 MMHG | HEIGHT: 63 IN | BODY MASS INDEX: 27.46 KG/M2 | DIASTOLIC BLOOD PRESSURE: 82 MMHG

## 2023-11-29 PROCEDURE — 99205 OFFICE O/P NEW HI 60 MIN: CPT

## 2023-11-29 RX ORDER — METHYLPREDNISOLONE 4 MG/1
4 TABLET ORAL
Qty: 1 | Refills: 0 | Status: ACTIVE | COMMUNITY
Start: 2023-11-29 | End: 1900-01-01

## 2023-12-18 ENCOUNTER — APPOINTMENT (OUTPATIENT)
Dept: RHEUMATOLOGY | Facility: CLINIC | Age: 48
End: 2023-12-18
Payer: COMMERCIAL

## 2023-12-18 VITALS
SYSTOLIC BLOOD PRESSURE: 116 MMHG | BODY MASS INDEX: 27.46 KG/M2 | HEART RATE: 90 BPM | DIASTOLIC BLOOD PRESSURE: 80 MMHG | WEIGHT: 155 LBS | HEIGHT: 63 IN | OXYGEN SATURATION: 98 %

## 2023-12-18 PROCEDURE — 99214 OFFICE O/P EST MOD 30 MIN: CPT

## 2023-12-18 NOTE — HISTORY OF PRESENT ILLNESS
[FreeTextEntry1] : 1.  SLE: Onset in 2012 with fatigue, fever, arthritis, and nephritis.  A kidney biopsy was performed around 2013 (results unknown).  She was treated with MMF 3 g/d and did well until 2016 at which time she had a  flare (extra-renal: malar rash, SLE in the scalp, Raynauds, alopecia).  Plaquenil and steroids were added.  Since that time she has done quite well.  She has not had cardiopulmonary involvement, oral ulcers, thrombosis. In Apr 2020 she developed a heavy chest, which improved spontaneously.  She also had pain in her fingertips, although no ischemic lesions were noted.  These also improved on their own. In early Jun 2021 she reported nausea without vomiting or abdominal pain that was improving on its own.  She also noted an abnormal sensation in the fingertips and toes without pain or discoloration. Her Feb 2023 labs were notable for increased proteinuria and DNA Ab. Same was true in Mar.  A similar trend would mean more aggressive treatment with or without a kidney biopsy. However, labs improved towards mid-2023.  2.  OB history:  uncomplicated pregnancy at age 16 3.  Migraine: long-standing problem, although not problematic in recent times until Aug 2020 at which time she had headache for a week.  The symptoms resolved. She saw a neurologist, and an MRI was ordered. However, she was not able to tolerate confinement in the machine. She had a follow-up with neurology in the late fall 2023.  4.  RTC tendonitis: pain in the left shoulder that was improving in late spring 2021 with conservative measures. 5.  COVID Infection: In mid-Feb 2023, she developed fever and congestion secondary to COVID. These symptoms resolved, but she was left quite fatigued. Leg pain then ensued, although this has been getting better.  6.  DLE: lesions in ears more active in May 2023.  She tried vitamin ointment without benefit. Eventually there was improvement.  7.  Abnormal LFTs: noted in 2023; unclear origin but consider anti-fungal med administered for toenail fungus. 8.  Bone Health: evaluated with BMD by OB/GYN 9.  Vertigo: episode in fall 2023 without underlying viral infection; improved after 10 days.   Meds Probiotic Plaquenil 400 mg/day CellCept 0.5 gm/d Vitamin D 2000 IU/d  Vaccine Prevnar 13 10/16/18 ([id number]; exp 2/20) PNVX 23 10/1/19 ([id number]; exp 3/25/21)

## 2023-12-18 NOTE — PHYSICAL EXAM
[General Appearance - Alert] : alert [General Appearance - In No Acute Distress] : in no acute distress [General Appearance - Well Nourished] : well nourished [General Appearance - Well Developed] : well developed [Sclera] : the sclera and conjunctiva were normal [Extraocular Movements] : extraocular movements were intact [Outer Ear] : the ears and nose were normal in appearance [Strabismus] : no strabismus was seen [Oropharynx] : the oropharynx was normal [Neck Appearance] : the appearance of the neck was normal [Neck Cervical Mass (___cm)] : no neck mass was observed [Jugular Venous Distention Increased] : there was no jugular-venous distention [Thyroid Diffuse Enlargement] : the thyroid was not enlarged [Heart Rate And Rhythm] : heart rate was normal and rhythm regular [Heart Sounds] : normal S1 and S2 [Murmurs] : no murmurs [Heart Sounds Gallop] : no gallops [Heart Sounds Pericardial Friction Rub] : no pericardial rub [Edema] : there was no peripheral edema [Abdomen Soft] : soft [Abdomen Tenderness] : non-tender [Abdomen Mass (___ Cm)] : no abdominal mass palpated [] : no hepato-splenomegaly [Cervical Lymph Nodes Enlarged Posterior Bilaterally] : posterior cervical [Cervical Lymph Nodes Enlarged Anterior Bilaterally] : anterior cervical [No CVA Tenderness] : no ~M costovertebral angle tenderness [Supraclavicular Lymph Nodes Enlarged Bilaterally] : supraclavicular [No Spinal Tenderness] : no spinal tenderness [Abnormal Walk] : normal gait [Nail Clubbing] : no clubbing  or cyanosis of the fingernails [Musculoskeletal - Swelling] : no joint swelling seen [Motor Tone] : muscle strength and tone were normal [Skin Color & Pigmentation] : normal skin color and pigmentation [Skin Turgor] : normal skin turgor [FreeTextEntry1] : DLE lesions in ears inactive [Sensation] : the sensory exam was normal to light touch and pinprick [Motor Exam] : the motor exam was normal [Oriented To Time, Place, And Person] : oriented to person, place, and time [Impaired Insight] : insight and judgment were intact [Affect] : the affect was normal

## 2023-12-18 NOTE — ASSESSMENT
[FreeTextEntry1] : 1.  SLE: Onset in 2012 with fatigue, fever, arthritis, and nephritis.  A kidney biopsy was performed around 2013 (results unknown).  She was treated with MMF 3 g/d and did well until 2016 at which time she had a  flare (extra-renal: malar rash, SLE in the scalp, Raynauds, alopecia).  Plaquenil and steroids were added.  Since that time she has done quite well.  She has not had cardiopulmonary involvement, oral ulcers, thrombosis. In Apr 2020 she developed a heavy chest, which improved spontaneously.  She also had pain in her fingertips, although no ischemic lesions were noted.  These also improved on their own. In early Jun 2021 she reported nausea without vomiting or abdominal pain that was improving on its own.  She also noted an abnormal sensation in the fingertips and toes without pain or discoloration. Her Feb 2023 labs were notable for increased proteinuria and DNA Ab. Same was true in Mar.  A similar trend would mean more aggressive treatment with or without a kidney biopsy. However, labs improved towards mid-2023.  2.  OB history:  uncomplicated pregnancy at age 16 3.  Migraine: long-standing problem, although not problematic in recent times until Aug 2020 at which time she had headache for a week.  The symptoms resolved. She saw a neurologist, and an MRI was ordered. However, she was not able to tolerate confinement in the machine. She had a follow-up with neurology in the late fall 2023.  4.  RTC tendonitis: pain in the left shoulder that was improving in late spring 2021 with conservative measures. 5.  COVID Infection: In mid-Feb 2023, she developed fever and congestion secondary to COVID. These symptoms resolved, but she was left quite fatigued. Leg pain then ensued, although this has been getting better.  6.  DLE: lesions in ears more active in May 2023.  She tried vitamin ointment without benefit. Eventually there was improvement.  7.  Abnormal LFTs: noted in 2023; unclear origin but consider anti-fungal med administered for toenail fungus. 8.  Bone Health: evaluated with BMD by OB/GYN 9.  Vertigo: episode in fall 2023 without underlying viral infection; improved after 10 days.   Plan Lab tests Reviewed internal and/or external records of other providers Contact me Return 3 months Systemic Lupus Erythematosus, known as lupus, is a chronic autoimmune disease that can affect any organ in the body posing threats to proper organ function and even to life. Therefore, close surveillance of all bodily functions is required, including but not limited to central and peripheral nervous system, ocular and auditory systems, cardiopulmonary function, kidney function, mucocutaneous and musculoskeletal systems as well as constitutional manifestations. Surveillance consists of history, physical, and laboratory tests. Treatment varies, but most of the drugs used are high risk and therefore also require close monitoring in the form of blood and urine tests. High risk medications used in the treatment of rheumatic diseases include steroids, disease modifying agents, immunosuppressive therapies, antimalarials, biologics, and chemotherapy. Regardless of which drug or class of drug, the potential toxicities of these therapies mandate close monitoring in the form of a history, physical, and laboratory tests.

## 2023-12-19 LAB
ALBUMIN SERPL ELPH-MCNC: 4.3 G/DL
ALP BLD-CCNC: 108 U/L
ALT SERPL-CCNC: 35 U/L
ANION GAP SERPL CALC-SCNC: 12 MMOL/L
APPEARANCE: CLEAR
AST SERPL-CCNC: 34 U/L
BACTERIA: NEGATIVE /HPF
BASOPHILS # BLD AUTO: 0.02 K/UL
BASOPHILS NFR BLD AUTO: 0.4 %
BILIRUB SERPL-MCNC: 0.2 MG/DL
BILIRUBIN URINE: NEGATIVE
BLOOD URINE: NEGATIVE
BUN SERPL-MCNC: 12 MG/DL
C3 SERPL-MCNC: 106 MG/DL
C4 SERPL-MCNC: 11 MG/DL
CALCIUM SERPL-MCNC: 9.7 MG/DL
CAST: 0 /LPF
CHLORIDE SERPL-SCNC: 102 MMOL/L
CK SERPL-CCNC: 88 U/L
CO2 SERPL-SCNC: 26 MMOL/L
COLOR: YELLOW
CREAT SERPL-MCNC: 0.77 MG/DL
CREAT SPEC-SCNC: 64 MG/DL
CREAT/PROT UR: 0.4 RATIO
EGFR: 94 ML/MIN/1.73M2
EOSINOPHIL # BLD AUTO: 0.04 K/UL
EOSINOPHIL NFR BLD AUTO: 0.8 %
EPITHELIAL CELLS: 0 /HPF
GLUCOSE QUALITATIVE U: NEGATIVE MG/DL
HCT VFR BLD CALC: 38.3 %
HGB BLD-MCNC: 12.6 G/DL
IMM GRANULOCYTES NFR BLD AUTO: 0.2 %
KETONES URINE: NEGATIVE MG/DL
LEUKOCYTE ESTERASE URINE: NEGATIVE
LYMPHOCYTES # BLD AUTO: 2.43 K/UL
LYMPHOCYTES NFR BLD AUTO: 50 %
MAN DIFF?: NORMAL
MCHC RBC-ENTMCNC: 28.2 PG
MCHC RBC-ENTMCNC: 32.9 GM/DL
MCV RBC AUTO: 85.7 FL
MICROSCOPIC-UA: NORMAL
MONOCYTES # BLD AUTO: 0.44 K/UL
MONOCYTES NFR BLD AUTO: 9.1 %
NEUTROPHILS # BLD AUTO: 1.92 K/UL
NEUTROPHILS NFR BLD AUTO: 39.5 %
NITRITE URINE: NEGATIVE
PH URINE: 7
PLATELET # BLD AUTO: 200 K/UL
POTASSIUM SERPL-SCNC: 4.5 MMOL/L
PROT SERPL-MCNC: 7.7 G/DL
PROT UR-MCNC: 27 MG/DL
PROTEIN URINE: 30 MG/DL
RBC # BLD: 4.47 M/UL
RBC # FLD: 13.5 %
RED BLOOD CELLS URINE: 0 /HPF
SODIUM SERPL-SCNC: 141 MMOL/L
SPECIFIC GRAVITY URINE: 1.01
UROBILINOGEN URINE: 0.2 MG/DL
WBC # FLD AUTO: 4.86 K/UL
WHITE BLOOD CELLS URINE: 0 /HPF

## 2023-12-20 LAB — DSDNA AB SER-ACNC: 629 IU/ML

## 2024-01-22 ENCOUNTER — NON-APPOINTMENT (OUTPATIENT)
Age: 49
End: 2024-01-22

## 2024-01-23 ENCOUNTER — APPOINTMENT (OUTPATIENT)
Dept: OTOLARYNGOLOGY | Facility: CLINIC | Age: 49
End: 2024-01-23
Payer: COMMERCIAL

## 2024-01-23 DIAGNOSIS — R42 DIZZINESS AND GIDDINESS: ICD-10-CM

## 2024-01-23 DIAGNOSIS — H81.11 BENIGN PAROXYSMAL VERTIGO, RIGHT EAR: ICD-10-CM

## 2024-01-23 DIAGNOSIS — G43.809 OTHER MIGRAINE, NOT INTRACTABLE, W/OUT STATUS MIGRAINOSUS: ICD-10-CM

## 2024-01-23 PROCEDURE — 99214 OFFICE O/P EST MOD 30 MIN: CPT

## 2024-01-30 PROBLEM — R42 DIZZINESS: Status: ACTIVE | Noted: 2023-10-31

## 2024-01-30 PROBLEM — G43.809 VESTIBULAR MIGRAINE: Status: ACTIVE | Noted: 2022-07-10

## 2024-01-30 PROBLEM — H81.11 BENIGN PAROXYSMAL POSITIONAL VERTIGO OF RIGHT EAR: Status: ACTIVE | Noted: 2023-10-31

## 2024-01-30 NOTE — ASSESSMENT
[FreeTextEntry1] : 48 year F folllow up for Dizziness  No patterns of low frequency or asymmetrical SNHL on audiogram. [place] Whitaker-pike Positive. Fukuda Step Test Negative. Negative Romberg. Patient denies acute history of recent viral infection or history of headaches or migraines. Denies fullness in the ear or fluctuations in hearing during episodes  7/8/2022 Audiogram shows AU Hearing -8k hz. AU Tymp A 10/31/23 Epley Maneuver completed in office. 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 It is usually triggered by specific changes head position. This might occur when you tip your head up or down, when you lie down, or when you turn over or sit up in bed. -BPPV occurs when crystals within the otolith organs in your inner ear which monitor head's movements become dislodged. When they become dislodged, they can move into one of the semicircular canals. This causes the semicircular canal to become sensitive to head position changes it would normally not respond to, which is what triggers the sense of disequilibrium. -The Epley Maneuver is a crystal repositioning procedure that moves the crystals from the fluid-filled semicircular canals of your inner ear into a tiny baglike open area (vestibule) that houses one of the otolith organs in your ear, where these particles don't cause trouble and are more easily resorbed. -Epley Maneuver Handout Given -Ophelia's Head Exercise Handout Given -Completed Vestibular Rehabilitation Therapy with complete resolution of symptoms   Vestibular Migraine - Discuss with patient that Migraines do not always have to take the form of a headache. Sometimes Migraines change over time to presents with Auras that affect the way we perceptive balance. - These symptoms are not generally associated with pressure or hearing changes but originate in the brainstem from faulty central processing of balance information from the inner ears. - Migraine Handout Given - Explained that treatment for Vestibular Migraines is to control the migraine itself with Mg supplements, Lifestyle changes, Improve sleep habits, and target food triggers that may exacerbate migraines - If symptoms are not controlled based on these changes can consider Migraine medications - Patient wants to try lifestyle changes and mg supplements - Patient following neurology for further management  -Return to clinic as needed or sooner if new/worsen symptoms present

## 2024-01-30 NOTE — PHYSICAL EXAM
[Nystagmus] : ~T no ~M nystagmus was seen [Fukuda Step Test] : Fukuda Step Test was Negative [Romberg's Sign] : Romberg's sign was absent [[place]-Halltamyke] : [place]-Hallpike: Negative [Normal] : mucosa is normal [Midline] : trachea located in midline position

## 2024-01-30 NOTE — HISTORY OF PRESENT ILLNESS
[de-identified] : 48 year old female with history of Migraines.  Presents for follow up evaluation of dizziness. Last seen in October with complaints of intermittent dizziness. States has completed sessions of vestibular therapy with [facility]. Reports significant improvement since initial evaluation. Still with occasional mild dizziness.

## 2024-03-11 NOTE — PHYSICAL EXAM
[FreeTextEntry1] : Constitutional: No signs of distress or signs of toxicity. Mental Status: Alert and well oriented. Speech fluent. No aphasia. Fund of knowledge intact. Psychiatric: Mood stable Cranial Nerve: PERRLA: No papilledema; No VFC; EOM full. no nystagmus. No Barbara. V1-3 intact. No facial asymmetry, hearing grossly intact; palate elevates symmetrically, tongue midline Motor: No involuntary movements noted.  Adequate bulk, tone throughout, 5/5 strength of all muscle groups DTR: present and symmetrical; no clonus, plantars  downgoing Sensory: intact to primary and secondary modalities; neg Romberg Cerebellar: adequate finger to nose and heel to shin bilaterally. Gait: non antalgic or ataxic. Eyes: no redness or swelling HEENT: intact; no signs of trauma. Neck: No masses noted Pulmonary: no respiratory distress Vascular: no temperature, color change or sudomotor changes.; no edema Musculoskeletal: examination of the cervical spine reveals no midline tenderness, range of motion full upon flexion, extension and lateral rotation. Negative facet tenderness, Negative Spurlings bilaterally. examination of the lumbar spine reveals no midline or paraspinal tenderness; Range of motion full upon flexion, extension and lateral rotation; negative facet loading, No tenderness of sciatic notch, No tenderness of bilateral greater trochanters, Negative TAIWO, negative SLRT bilaterally, Skin: No rash.

## 2024-03-20 RX ORDER — HYDROXYCHLOROQUINE SULFATE 200 MG
2 TABLET ORAL
Qty: 0 | Refills: 0 | DISCHARGE

## 2024-03-25 PROBLEM — D25.9 LEIOMYOMA OF UTERUS, UNSPECIFIED: Chronic | Status: ACTIVE | Noted: 2017-08-29

## 2024-04-01 RX ORDER — HYDROXYCHLOROQUINE SULFATE 200 MG/1
200 TABLET, FILM COATED ORAL
Qty: 180 | Refills: 3 | Status: ACTIVE | COMMUNITY
Start: 2016-06-14 | End: 1900-01-01

## 2024-04-02 ENCOUNTER — APPOINTMENT (OUTPATIENT)
Dept: RHEUMATOLOGY | Facility: CLINIC | Age: 49
End: 2024-04-02
Payer: COMMERCIAL

## 2024-04-02 VITALS
DIASTOLIC BLOOD PRESSURE: 83 MMHG | OXYGEN SATURATION: 96 % | SYSTOLIC BLOOD PRESSURE: 124 MMHG | HEART RATE: 86 BPM | WEIGHT: 150 LBS | BODY MASS INDEX: 26.58 KG/M2 | HEIGHT: 63 IN

## 2024-04-02 DIAGNOSIS — Z79.899 OTHER LONG TERM (CURRENT) DRUG THERAPY: ICD-10-CM

## 2024-04-02 DIAGNOSIS — M32.9 SYSTEMIC LUPUS ERYTHEMATOSUS, UNSPECIFIED: ICD-10-CM

## 2024-04-02 DIAGNOSIS — M32.14 GLOMERULAR DISEASE IN SYSTEMIC LUPUS ERYTHEMATOSUS: ICD-10-CM

## 2024-04-02 DIAGNOSIS — Z79.60 LONG TERM (CURRENT) USE OF UNSPECIFIED IMMUNOMODULATORS AND IMMUNOSUPPRESSANTS: ICD-10-CM

## 2024-04-02 DIAGNOSIS — L93.0 DISCOID LUPUS ERYTHEMATOSUS: ICD-10-CM

## 2024-04-02 PROCEDURE — 99214 OFFICE O/P EST MOD 30 MIN: CPT

## 2024-04-02 PROCEDURE — G2211 COMPLEX E/M VISIT ADD ON: CPT

## 2024-04-02 RX ORDER — TRIAMCINOLONE ACETONIDE 1 MG/G
0.1 CREAM TOPICAL 3 TIMES DAILY
Qty: 1 | Refills: 3 | Status: ACTIVE | COMMUNITY
Start: 2024-04-02 | End: 1900-01-01

## 2024-04-03 LAB
ALBUMIN SERPL ELPH-MCNC: 4.1 G/DL
ALP BLD-CCNC: 95 U/L
ALT SERPL-CCNC: 19 U/L
ANION GAP SERPL CALC-SCNC: 12 MMOL/L
APPEARANCE: CLEAR
AST SERPL-CCNC: 26 U/L
BACTERIA: ABNORMAL /HPF
BASOPHILS # BLD AUTO: 0.01 K/UL
BASOPHILS NFR BLD AUTO: 0.2 %
BILIRUB SERPL-MCNC: 0.2 MG/DL
BILIRUBIN URINE: NEGATIVE
BLOOD URINE: NEGATIVE
BUN SERPL-MCNC: 12 MG/DL
C3 SERPL-MCNC: 92 MG/DL
C4 SERPL-MCNC: 10 MG/DL
CALCIUM SERPL-MCNC: 9.2 MG/DL
CAST: 1 /LPF
CHLORIDE SERPL-SCNC: 106 MMOL/L
CK SERPL-CCNC: 87 U/L
CO2 SERPL-SCNC: 26 MMOL/L
COLOR: YELLOW
CREAT SERPL-MCNC: 0.81 MG/DL
CREAT SPEC-SCNC: 294 MG/DL
CREAT/PROT UR: 0.6 RATIO
EGFR: 89 ML/MIN/1.73M2
EOSINOPHIL # BLD AUTO: 0.03 K/UL
EOSINOPHIL NFR BLD AUTO: 0.7 %
EPITHELIAL CELLS: 9 /HPF
GLUCOSE QUALITATIVE U: NEGATIVE MG/DL
HCT VFR BLD CALC: 34.6 %
HGB BLD-MCNC: 11.8 G/DL
IMM GRANULOCYTES NFR BLD AUTO: 0.2 %
KETONES URINE: ABNORMAL MG/DL
LEUKOCYTE ESTERASE URINE: NEGATIVE
LYMPHOCYTES # BLD AUTO: 1.95 K/UL
LYMPHOCYTES NFR BLD AUTO: 45.2 %
MAN DIFF?: NORMAL
MCHC RBC-ENTMCNC: 28.9 PG
MCHC RBC-ENTMCNC: 34.1 GM/DL
MCV RBC AUTO: 84.8 FL
MICROSCOPIC-UA: NORMAL
MONOCYTES # BLD AUTO: 0.35 K/UL
MONOCYTES NFR BLD AUTO: 8.1 %
NEUTROPHILS # BLD AUTO: 1.96 K/UL
NEUTROPHILS NFR BLD AUTO: 45.6 %
NITRITE URINE: NEGATIVE
PH URINE: 6
PLATELET # BLD AUTO: 176 K/UL
POTASSIUM SERPL-SCNC: 3.9 MMOL/L
PROT SERPL-MCNC: 7.2 G/DL
PROT UR-MCNC: 186 MG/DL
PROTEIN URINE: 300 MG/DL
RBC # BLD: 4.08 M/UL
RBC # FLD: 13.2 %
RED BLOOD CELLS URINE: 2 /HPF
SODIUM SERPL-SCNC: 144 MMOL/L
SPECIFIC GRAVITY URINE: 1.03
UROBILINOGEN URINE: 1 MG/DL
WBC # FLD AUTO: 4.31 K/UL
WHITE BLOOD CELLS URINE: 3 /HPF

## 2024-04-03 NOTE — HISTORY OF PRESENT ILLNESS
[FreeTextEntry1] : 1.  SLE: Onset in 2012 with fatigue, fever, arthritis, and nephritis.  A kidney biopsy was performed around 2013 (results unknown).  She was treated with MMF 3 g/d and did well until 2016 at which time she had a  flare (extra-renal: malar rash, SLE in the scalp, Raynauds, alopecia).  Plaquenil and steroids were added.  Since that time she has done quite well.  She has not had cardiopulmonary involvement, oral ulcers, thrombosis. In Apr 2020 she developed a heavy chest, which improved spontaneously.  She also had pain in her fingertips, although no ischemic lesions were noted.  These also improved on their own. In early Jun 2021 she reported nausea without vomiting or abdominal pain that was improving on its own.  She also noted an abnormal sensation in the fingertips and toes without pain or discoloration. Her Feb 2023 labs were notable for increased proteinuria and DNA Ab. Same was true in Mar.  A similar trend would mean more aggressive treatment with or without a kidney biopsy. However, labs improved towards mid-2023.  2.  OB history:  uncomplicated pregnancy at age 16 3.  Migraine: long-standing problem, although not problematic in recent times until Aug 2020 at which time she had headache for a week.  The symptoms resolved. She saw a neurologist, and an MRI was ordered. However, she was not able to tolerate confinement in the machine. She had a follow-up with neurology in the late fall 2023.  4.  RTC tendonitis: pain in the left shoulder that was improving in late spring 2021 with conservative measures. 5.  COVID Infection: In mid-Feb 2023, she developed fever and congestion secondary to COVID. These symptoms resolved, but she was left quite fatigued. Leg pain then ensued, although this has been getting better.  6.  DLE: lesions in ears more active in May 2023.  She tried vitamin ointment without benefit. Eventually there was improvement. However, because of more activity, OTC hydrocortisone was unsuccessfully tried. In Apr 2024, triamcinolone was tried for the rash in both ears as well as over the left eyebrow.  7.  Abnormal LFTs: noted in 2023; unclear origin but consider anti-fungal med administered for toenail fungus. 8.  Bone Health: evaluated with BMD by OB/GYN 9.  Vertigo: episode in fall 2023 without underlying viral infection; improved after 10 days.   Meds Probiotic Plaquenil 400 mg/day CellCept 0.5 gm/d Vitamin D 2000 IU/d  Vaccine Prevnar 13 10/16/18 ([id number]; exp 2/20) PNVX 23 10/1/19 ([id number]; exp 3/25/21)

## 2024-04-03 NOTE — PHYSICAL EXAM
[General Appearance - In No Acute Distress] : in no acute distress [General Appearance - Alert] : alert [General Appearance - Well Nourished] : well nourished [General Appearance - Well Developed] : well developed [Sclera] : the sclera and conjunctiva were normal [Extraocular Movements] : extraocular movements were intact [Strabismus] : no strabismus was seen [Outer Ear] : the ears and nose were normal in appearance [Oropharynx] : the oropharynx was normal [Neck Appearance] : the appearance of the neck was normal [Neck Cervical Mass (___cm)] : no neck mass was observed [Jugular Venous Distention Increased] : there was no jugular-venous distention [Thyroid Diffuse Enlargement] : the thyroid was not enlarged [Heart Rate And Rhythm] : heart rate was normal and rhythm regular [Heart Sounds] : normal S1 and S2 [Heart Sounds Gallop] : no gallops [Murmurs] : no murmurs [Heart Sounds Pericardial Friction Rub] : no pericardial rub [Edema] : there was no peripheral edema [Abdomen Soft] : soft [Abdomen Tenderness] : non-tender [] : no hepato-splenomegaly [Abdomen Mass (___ Cm)] : no abdominal mass palpated [Cervical Lymph Nodes Enlarged Posterior Bilaterally] : posterior cervical [Cervical Lymph Nodes Enlarged Anterior Bilaterally] : anterior cervical [Supraclavicular Lymph Nodes Enlarged Bilaterally] : supraclavicular [No CVA Tenderness] : no ~M costovertebral angle tenderness [No Spinal Tenderness] : no spinal tenderness [Abnormal Walk] : normal gait [Musculoskeletal - Swelling] : no joint swelling seen [Nail Clubbing] : no clubbing  or cyanosis of the fingernails [Motor Tone] : muscle strength and tone were normal [Skin Color & Pigmentation] : normal skin color and pigmentation [Skin Turgor] : normal skin turgor [FreeTextEntry1] : DLE lesions in ears active along with a small lesion above the left eyebrow [Sensation] : the sensory exam was normal to light touch and pinprick [Motor Exam] : the motor exam was normal [Oriented To Time, Place, And Person] : oriented to person, place, and time [Impaired Insight] : insight and judgment were intact [Affect] : the affect was normal

## 2024-04-03 NOTE — ASSESSMENT
[FreeTextEntry1] : 1.  SLE: Onset in 2012 with fatigue, fever, arthritis, and nephritis.  A kidney biopsy was performed around 2013 (results unknown).  She was treated with MMF 3 g/d and did well until 2016 at which time she had a  flare (extra-renal: malar rash, SLE in the scalp, Raynauds, alopecia).  Plaquenil and steroids were added.  Since that time she has done quite well.  She has not had cardiopulmonary involvement, oral ulcers, thrombosis. In Apr 2020 she developed a heavy chest, which improved spontaneously.  She also had pain in her fingertips, although no ischemic lesions were noted.  These also improved on their own. In early Jun 2021 she reported nausea without vomiting or abdominal pain that was improving on its own.  She also noted an abnormal sensation in the fingertips and toes without pain or discoloration. Her Feb 2023 labs were notable for increased proteinuria and DNA Ab. Same was true in Mar.  A similar trend would mean more aggressive treatment with or without a kidney biopsy. However, labs improved towards mid-2023.  2.  OB history:  uncomplicated pregnancy at age 16 3.  Migraine: long-standing problem, although not problematic in recent times until Aug 2020 at which time she had headache for a week.  The symptoms resolved. She saw a neurologist, and an MRI was ordered. However, she was not able to tolerate confinement in the machine. She had a follow-up with neurology in the late fall 2023.  4.  RTC tendonitis: pain in the left shoulder that was improving in late spring 2021 with conservative measures. 5.  COVID Infection: In mid-Feb 2023, she developed fever and congestion secondary to COVID. These symptoms resolved, but she was left quite fatigued. Leg pain then ensued, although this has been getting better.  6.  DLE: lesions in ears more active in May 2023.  She tried vitamin ointment without benefit. Eventually there was improvement. However, because of more activity, OTC hydrocortisone was unsuccessfully tried. In Apr 2024, triamcinolone was tried for the rash in both ears as well as over the left eyebrow.  7.  Abnormal LFTs: noted in 2023; unclear origin but consider anti-fungal med administered for toenail fungus. 8.  Bone Health: evaluated with BMD by OB/GYN 9.  Vertigo: episode in fall 2023 without underlying viral infection; improved after 10 days.   Plan Lab tests Reviewed internal and/or external records of other providers Contact me Triamcinolone 0.1% Return 3 months Systemic Lupus Erythematosus, known as lupus, is a chronic autoimmune disease that can affect any organ in the body posing threats to proper organ function and even to life. Therefore, close surveillance of all bodily functions is required, including but not limited to central and peripheral nervous system, ocular and auditory systems, cardiopulmonary function, kidney function, mucocutaneous and musculoskeletal systems as well as constitutional manifestations. Surveillance consists of history, physical, and laboratory tests. Treatment varies, but most of the drugs used are high risk and therefore also require close monitoring in the form of blood and urine tests. High risk medications used in the treatment of rheumatic diseases include steroids, disease modifying agents, immunosuppressive therapies, antimalarials, biologics, and chemotherapy. Regardless of which drug or class of drug, the potential toxicities of these therapies mandate close monitoring in the form of a history, physical, and laboratory tests.

## 2024-04-04 LAB — DSDNA AB SER-ACNC: 40 IU/ML

## 2024-04-07 LAB — HYDROXYCHLOROQUINE CONCENTRATION: 1168 NG/ML

## 2024-06-03 NOTE — PATIENT PROFILE ADULT. - PRO ARRIVE FROM
Last Ov 4/23/24    Requested Prescriptions     Pending Prescriptions Disp Refills    amLODIPine-benazepriL (Lotrel) 5-10 mg capsule [Pharmacy Med Name: AMLOD/BENAZP CAP 5-10MG] 90 capsule 1     Sig: TAKE 1 CAPSULE DAILY       
home

## 2024-07-27 ENCOUNTER — NON-APPOINTMENT (OUTPATIENT)
Age: 49
End: 2024-07-27

## 2024-07-30 DIAGNOSIS — M32.9 SYSTEMIC LUPUS ERYTHEMATOSUS, UNSPECIFIED: ICD-10-CM

## 2024-07-31 LAB
ALBUMIN SERPL ELPH-MCNC: 3.6 G/DL
ALP BLD-CCNC: 108 U/L
ALT SERPL-CCNC: 35 U/L
ANION GAP SERPL CALC-SCNC: 11 MMOL/L
AST SERPL-CCNC: 33 U/L
BASOPHILS # BLD AUTO: 0.02 K/UL
BASOPHILS NFR BLD AUTO: 0.3 %
BILIRUB SERPL-MCNC: 0.3 MG/DL
BUN SERPL-MCNC: 10 MG/DL
C3 SERPL-MCNC: 109 MG/DL
C4 SERPL-MCNC: >9 MG/DL
CALCIUM SERPL-MCNC: 9.1 MG/DL
CHLORIDE SERPL-SCNC: 104 MMOL/L
CK SERPL-CCNC: 60 U/L
CO2 SERPL-SCNC: 21 MMOL/L
CREAT SERPL-MCNC: 0.7 MG/DL
EGFR: 105 ML/MIN/1.73M2
EOSINOPHIL # BLD AUTO: 0.02 K/UL
EOSINOPHIL NFR BLD AUTO: 0.3 %
HCT VFR BLD CALC: 33.2 %
HGB BLD-MCNC: 11.1 G/DL
IMM GRANULOCYTES NFR BLD AUTO: 0.3 %
LYMPHOCYTES # BLD AUTO: 1.54 K/UL
LYMPHOCYTES NFR BLD AUTO: 25.7 %
MAN DIFF?: NORMAL
MCHC RBC-ENTMCNC: 28.5 PG
MCHC RBC-ENTMCNC: 33.4 GM/DL
MCV RBC AUTO: 85.3 FL
MONOCYTES # BLD AUTO: 0.4 K/UL
MONOCYTES NFR BLD AUTO: 6.7 %
NEUTROPHILS # BLD AUTO: 4 K/UL
NEUTROPHILS NFR BLD AUTO: 66.7 %
PLATELET # BLD AUTO: 197 K/UL
POTASSIUM SERPL-SCNC: 4.3 MMOL/L
PROT SERPL-MCNC: 7.3 G/DL
RBC # BLD: 3.89 M/UL
RBC # FLD: 12.7 %
SODIUM SERPL-SCNC: 137 MMOL/L
WBC # FLD AUTO: 6 K/UL

## 2024-08-01 LAB — DSDNA AB SER-ACNC: 50 IU/ML

## 2024-08-02 LAB — HYDROXYCHLOROQUINE CONCENTRATION: 1463.9 NG/ML

## 2024-08-09 ENCOUNTER — APPOINTMENT (OUTPATIENT)
Dept: ORTHOPEDIC SURGERY | Facility: CLINIC | Age: 49
End: 2024-08-09

## 2024-08-12 ENCOUNTER — APPOINTMENT (OUTPATIENT)
Dept: OBGYN | Facility: CLINIC | Age: 49
End: 2024-08-12
Payer: COMMERCIAL

## 2024-08-12 VITALS
SYSTOLIC BLOOD PRESSURE: 100 MMHG | BODY MASS INDEX: 27.29 KG/M2 | WEIGHT: 154 LBS | DIASTOLIC BLOOD PRESSURE: 60 MMHG | HEIGHT: 63 IN

## 2024-08-12 DIAGNOSIS — B96.89 ACUTE VAGINITIS: ICD-10-CM

## 2024-08-12 DIAGNOSIS — N76.0 ACUTE VAGINITIS: ICD-10-CM

## 2024-08-12 PROCEDURE — 99213 OFFICE O/P EST LOW 20 MIN: CPT

## 2024-08-12 RX ORDER — METRONIDAZOLE 7.5 MG/G
0.75 GEL VAGINAL
Qty: 1 | Refills: 0 | Status: ACTIVE | COMMUNITY
Start: 2024-08-12 | End: 1900-01-01

## 2024-08-12 NOTE — DISCUSSION/SUMMARY
[FreeTextEntry1] : Avoid douching, sugary foods, constrictive clothing, perfumed feminine products Keep area clean and dry Change pads and tampons every 4-8 hours Use mild unscented soap or plain water to clean vagina at least once daily Wear cotton underwear Wipe carefully after bowel movements RTO PRN

## 2024-08-12 NOTE — HISTORY OF PRESENT ILLNESS
[LMPDate] : 2017 [FreeTextEntry1] : Hysterectomy  [FreeTextEntry4] : Patient would like Gonorrhea and Chlamydia screening today.  Declines other STI blood work at this time

## 2024-08-14 LAB
C TRACH RRNA SPEC QL NAA+PROBE: NOT DETECTED
N GONORRHOEA RRNA SPEC QL NAA+PROBE: NOT DETECTED
SOURCE AMPLIFICATION: NORMAL
SOURCE AMPLIFICATION: NORMAL
T VAGINALIS RRNA SPEC QL NAA+PROBE: NOT DETECTED

## 2024-08-29 ENCOUNTER — APPOINTMENT (OUTPATIENT)
Dept: MAMMOGRAPHY | Facility: IMAGING CENTER | Age: 49
End: 2024-08-29

## 2024-08-29 ENCOUNTER — APPOINTMENT (OUTPATIENT)
Dept: ULTRASOUND IMAGING | Facility: IMAGING CENTER | Age: 49
End: 2024-08-29

## 2024-08-30 ENCOUNTER — OUTPATIENT (OUTPATIENT)
Dept: OUTPATIENT SERVICES | Facility: HOSPITAL | Age: 49
LOS: 1 days | End: 2024-08-30
Payer: COMMERCIAL

## 2024-08-30 ENCOUNTER — APPOINTMENT (OUTPATIENT)
Dept: MAMMOGRAPHY | Facility: IMAGING CENTER | Age: 49
End: 2024-08-30
Payer: COMMERCIAL

## 2024-08-30 ENCOUNTER — APPOINTMENT (OUTPATIENT)
Dept: ULTRASOUND IMAGING | Facility: IMAGING CENTER | Age: 49
End: 2024-08-30
Payer: COMMERCIAL

## 2024-08-30 DIAGNOSIS — R92.30 DENSE BREASTS, UNSPECIFIED: ICD-10-CM

## 2024-08-30 PROCEDURE — 77067 SCR MAMMO BI INCL CAD: CPT | Mod: 26

## 2024-08-30 PROCEDURE — 76641 ULTRASOUND BREAST COMPLETE: CPT | Mod: 26,50

## 2024-08-30 PROCEDURE — 77063 BREAST TOMOSYNTHESIS BI: CPT | Mod: 26

## 2024-08-30 PROCEDURE — 77067 SCR MAMMO BI INCL CAD: CPT

## 2024-08-30 PROCEDURE — 76641 ULTRASOUND BREAST COMPLETE: CPT

## 2024-08-30 PROCEDURE — 77063 BREAST TOMOSYNTHESIS BI: CPT

## 2024-09-13 ENCOUNTER — APPOINTMENT (OUTPATIENT)
Dept: OPHTHALMOLOGY | Facility: CLINIC | Age: 49
End: 2024-09-13

## 2024-09-13 PROCEDURE — ZZZZZ: CPT

## 2024-09-13 NOTE — ED PROVIDER NOTE - OBJECTIVE STATEMENT
Dr. Hunter notified of patient's ICD and being a contraindication to procedure. OK to proceed with EGD/pillcam per MD.   
May discuss health history with responsible party  
43 y/o F w/ PMHx of lupus and fibroids, presents to the ED c/o diffuse right sided abd pain radiating to right pelvis and right flank x4 days. Pt states she never has had pain like this before. Pt notes she was offered surgery for fibroids. Last CT in early June. Pt is on Lupron to shrink the fibroid. Endorses loss of appetite. Denies weight loss, nausea, vomiting, constipation, dysuria, hematuria, vaginal bleeding or any other complaints. LNMP: 8/26/17.

## 2024-09-30 ENCOUNTER — NON-APPOINTMENT (OUTPATIENT)
Age: 49
End: 2024-09-30

## 2024-10-01 ENCOUNTER — APPOINTMENT (OUTPATIENT)
Dept: RHEUMATOLOGY | Facility: CLINIC | Age: 49
End: 2024-10-01

## 2024-10-01 VITALS
BODY MASS INDEX: 26.58 KG/M2 | HEIGHT: 63 IN | DIASTOLIC BLOOD PRESSURE: 75 MMHG | WEIGHT: 150 LBS | HEART RATE: 85 BPM | OXYGEN SATURATION: 98 % | SYSTOLIC BLOOD PRESSURE: 112 MMHG

## 2024-10-01 DIAGNOSIS — M32.14 GLOMERULAR DISEASE IN SYSTEMIC LUPUS ERYTHEMATOSUS: ICD-10-CM

## 2024-10-01 DIAGNOSIS — L93.0 DISCOID LUPUS ERYTHEMATOSUS: ICD-10-CM

## 2024-10-01 DIAGNOSIS — M32.9 SYSTEMIC LUPUS ERYTHEMATOSUS, UNSPECIFIED: ICD-10-CM

## 2024-10-01 DIAGNOSIS — Z79.899 OTHER LONG TERM (CURRENT) DRUG THERAPY: ICD-10-CM

## 2024-10-01 PROCEDURE — 99215 OFFICE O/P EST HI 40 MIN: CPT | Mod: 25

## 2024-10-01 PROCEDURE — G0008: CPT

## 2024-10-01 PROCEDURE — 90656 IIV3 VACC NO PRSV 0.5 ML IM: CPT

## 2024-10-01 RX ORDER — PREDNISONE 5 MG/1
5 TABLET ORAL TWICE DAILY
Qty: 120 | Refills: 3 | Status: ACTIVE | COMMUNITY
Start: 2024-10-01 | End: 1900-01-01

## 2024-10-02 LAB
25(OH)D3 SERPL-MCNC: 44.3 NG/ML
ALBUMIN SERPL ELPH-MCNC: 4.1 G/DL
ALP BLD-CCNC: 95 U/L
ALT SERPL-CCNC: 19 U/L
ANION GAP SERPL CALC-SCNC: 14 MMOL/L
APPEARANCE: CLEAR
AST SERPL-CCNC: 31 U/L
BACTERIA: NEGATIVE /HPF
BASOPHILS # BLD AUTO: 0.02 K/UL
BASOPHILS NFR BLD AUTO: 0.5 %
BILIRUB SERPL-MCNC: 0.4 MG/DL
BILIRUBIN URINE: NEGATIVE
BLOOD URINE: NEGATIVE
BUN SERPL-MCNC: 13 MG/DL
C3 SERPL-MCNC: 76 MG/DL
C4 SERPL-MCNC: 7 MG/DL
CALCIUM SERPL-MCNC: 9.6 MG/DL
CAST: 0 /LPF
CHLORIDE SERPL-SCNC: 104 MMOL/L
CK SERPL-CCNC: 72 U/L
CO2 SERPL-SCNC: 24 MMOL/L
COLOR: YELLOW
CREAT SERPL-MCNC: 0.7 MG/DL
CREAT SPEC-SCNC: 29 MG/DL
CREAT/PROT UR: 0.8 RATIO
EGFR: 105 ML/MIN/1.73M2
EOSINOPHIL # BLD AUTO: 0.01 K/UL
EOSINOPHIL NFR BLD AUTO: 0.2 %
EPITHELIAL CELLS: 1 /HPF
GLUCOSE QUALITATIVE U: NEGATIVE MG/DL
HCT VFR BLD CALC: 35.1 %
HGB BLD-MCNC: 11.7 G/DL
IMM GRANULOCYTES NFR BLD AUTO: 0.2 %
KETONES URINE: NEGATIVE MG/DL
LEUKOCYTE ESTERASE URINE: NEGATIVE
LYMPHOCYTES # BLD AUTO: 1.8 K/UL
LYMPHOCYTES NFR BLD AUTO: 41.5 %
MAN DIFF?: NORMAL
MCHC RBC-ENTMCNC: 29.1 PG
MCHC RBC-ENTMCNC: 33.3 GM/DL
MCV RBC AUTO: 87.3 FL
MICROSCOPIC-UA: NORMAL
MONOCYTES # BLD AUTO: 0.31 K/UL
MONOCYTES NFR BLD AUTO: 7.1 %
NEUTROPHILS # BLD AUTO: 2.19 K/UL
NEUTROPHILS NFR BLD AUTO: 50.5 %
NITRITE URINE: NEGATIVE
PH URINE: 7
PLATELET # BLD AUTO: 165 K/UL
POTASSIUM SERPL-SCNC: 4.7 MMOL/L
PROT SERPL-MCNC: 7.7 G/DL
PROT UR-MCNC: 23 MG/DL
PROTEIN URINE: 30 MG/DL
RBC # BLD: 4.02 M/UL
RBC # FLD: 14 %
RED BLOOD CELLS URINE: 1 /HPF
SODIUM SERPL-SCNC: 141 MMOL/L
SPECIFIC GRAVITY URINE: 1.01
UROBILINOGEN URINE: 0.2 MG/DL
WBC # FLD AUTO: 4.34 K/UL
WHITE BLOOD CELLS URINE: 0 /HPF

## 2024-10-03 LAB
DSDNA AB SER-ACNC: 85 IU/ML
ENA RNP AB SER IA-ACNC: 4.3 AL
ENA SM AB SER IA-ACNC: >8 AL
ENA SS-A AB SER IA-ACNC: 0.2 AL
ENA SS-B AB SER IA-ACNC: <0.2 AL

## 2024-10-03 NOTE — ASSESSMENT
[FreeTextEntry1] : 1.  SLE: Onset in 2012 with fatigue, fever, arthritis, and nephritis.  A kidney biopsy was performed around 2013 (results unknown).  She was treated with MMF 3 g/d and did well until 2016 at which time she had a  flare (extra-renal: malar rash, SLE in the scalp, Raynauds, alopecia).  Plaquenil and steroids were added.  Since that time she has done quite well.  She has not had cardiopulmonary involvement, oral ulcers, thrombosis. In Apr 2020 she developed a heavy chest, which improved spontaneously.  She also had pain in her fingertips, although no ischemic lesions were noted.  These also improved on their own. In early Jun 2021 she reported nausea without vomiting or abdominal pain that was improving on its own.  She also noted an abnormal sensation in the fingertips and toes without pain or discoloration. Her Feb 2023 labs were notable for increased proteinuria and DNA Ab. Same was true in Mar.  A similar trend would mean more aggressive treatment with or without a kidney biopsy. However, labs improved towards mid-2023.  2.  OB history:  uncomplicated pregnancy at age 16 3.  Migraine: long-standing problem, although not problematic in recent times until Aug 2020 at which time she had headache for a week.  The symptoms resolved. She saw a neurologist, and an MRI was ordered. However, she was not able to tolerate confinement in the machine. She had a follow-up with neurology in the late fall 2023.  4.  RTC tendonitis: pain in the left shoulder that was improving in late spring 2021 with conservative measures. 5.  COVID Infection: In mid-Feb 2023, she developed fever and congestion secondary to COVID. These symptoms resolved, but she was left quite fatigued. Leg pain then ensued, although this has been getting better.  6.  DLE: lesions in ears more active in May 2023.  She tried vitamin ointment without benefit. Eventually there was improvement. However, because of more activity, OTC hydrocortisone was unsuccessfully tried. In Apr 2024, triamcinolone was tried for the rash in both ears as well as over the left eyebrow.  In Sep 2024 she reported an outbreak of DLE on the face and in the ears and hands. Prednisone was administered.  7.  Abnormal LFTs: noted in 2023; unclear origin but consider anti-fungal med administered for toenail fungus. 8.  Bone Health: evaluated with BMD by OB/GYN 9.  Vertigo: episode in fall 2023 without underlying viral infection; improved after 10 days.   Plan Lab tests Reviewed internal and/or external records of other providers Contact me in one week Prednisone 20 mg/d and hopefully a rapid taper Return 3 months Systemic Lupus Erythematosus, known as lupus, is a chronic autoimmune disease that can affect any organ in the body posing threats to proper organ function and even to life. Therefore, close surveillance of all bodily functions is required, including but not limited to central and peripheral nervous system, ocular and auditory systems, cardiopulmonary function, kidney function, mucocutaneous and musculoskeletal systems as well as constitutional manifestations. Surveillance consists of history, physical, and laboratory tests. Treatment varies, but most of the drugs used are high risk and therefore also require close monitoring in the form of blood and urine tests. High risk medications used in the treatment of rheumatic diseases include steroids, disease modifying agents, immunosuppressive therapies, antimalarials, biologics, and chemotherapy. Regardless of which drug or class of drug, the potential toxicities of these therapies mandate close monitoring in the form of a history, physical, and laboratory tests. Flu shot IM left de

## 2024-10-03 NOTE — PHYSICAL EXAM
[General Appearance - Alert] : alert [General Appearance - In No Acute Distress] : in no acute distress [General Appearance - Well Nourished] : well nourished [General Appearance - Well Developed] : well developed [Sclera] : the sclera and conjunctiva were normal [Extraocular Movements] : extraocular movements were intact [Strabismus] : no strabismus was seen [Outer Ear] : the ears and nose were normal in appearance [Oropharynx] : the oropharynx was normal [Neck Appearance] : the appearance of the neck was normal [Neck Cervical Mass (___cm)] : no neck mass was observed [Jugular Venous Distention Increased] : there was no jugular-venous distention [Thyroid Diffuse Enlargement] : the thyroid was not enlarged [Heart Rate And Rhythm] : heart rate was normal and rhythm regular [Heart Sounds] : normal S1 and S2 [Heart Sounds Gallop] : no gallops [Murmurs] : no murmurs [Heart Sounds Pericardial Friction Rub] : no pericardial rub [Edema] : there was no peripheral edema [Abdomen Soft] : soft [Abdomen Tenderness] : non-tender [] : no hepato-splenomegaly [Abdomen Mass (___ Cm)] : no abdominal mass palpated [Cervical Lymph Nodes Enlarged Posterior Bilaterally] : posterior cervical [Cervical Lymph Nodes Enlarged Anterior Bilaterally] : anterior cervical [Supraclavicular Lymph Nodes Enlarged Bilaterally] : supraclavicular [No CVA Tenderness] : no ~M costovertebral angle tenderness [No Spinal Tenderness] : no spinal tenderness [Skin Color & Pigmentation] : normal skin color and pigmentation [Skin Turgor] : normal skin turgor [Sensation] : the sensory exam was normal to light touch and pinprick [Motor Exam] : the motor exam was normal [Oriented To Time, Place, And Person] : oriented to person, place, and time [Impaired Insight] : insight and judgment were intact [Affect] : the affect was normal [Abnormal Walk] : normal gait [Nail Clubbing] : no clubbing  or cyanosis of the fingernails [Musculoskeletal - Swelling] : no joint swelling seen [Motor Tone] : muscle strength and tone were normal [FreeTextEntry1] : DLE lesions in ears active along with a small lesion above the left eyebrow and on nose and hands

## 2024-10-03 NOTE — HISTORY OF PRESENT ILLNESS
[FreeTextEntry1] : 1.  SLE: Onset in 2012 with fatigue, fever, arthritis, and nephritis.  A kidney biopsy was performed around 2013 (results unknown).  She was treated with MMF 3 g/d and did well until 2016 at which time she had a  flare (extra-renal: malar rash, SLE in the scalp, Raynauds, alopecia).  Plaquenil and steroids were added.  Since that time she has done quite well.  She has not had cardiopulmonary involvement, oral ulcers, thrombosis. In Apr 2020 she developed a heavy chest, which improved spontaneously.  She also had pain in her fingertips, although no ischemic lesions were noted.  These also improved on their own. In early Jun 2021 she reported nausea without vomiting or abdominal pain that was improving on its own.  She also noted an abnormal sensation in the fingertips and toes without pain or discoloration. Her Feb 2023 labs were notable for increased proteinuria and DNA Ab. Same was true in Mar.  A similar trend would mean more aggressive treatment with or without a kidney biopsy. However, labs improved towards mid-2023.  2.  OB history:  uncomplicated pregnancy at age 16 3.  Migraine: long-standing problem, although not problematic in recent times until Aug 2020 at which time she had headache for a week.  The symptoms resolved. She saw a neurologist, and an MRI was ordered. However, she was not able to tolerate confinement in the machine. She had a follow-up with neurology in the late fall 2023.  4.  RTC tendonitis: pain in the left shoulder that was improving in late spring 2021 with conservative measures. 5.  COVID Infection: In mid-Feb 2023, she developed fever and congestion secondary to COVID. These symptoms resolved, but she was left quite fatigued. Leg pain then ensued, although this has been getting better.  6.  DLE: lesions in ears more active in May 2023.  She tried vitamin ointment without benefit. Eventually there was improvement. However, because of more activity, OTC hydrocortisone was unsuccessfully tried. In Apr 2024, triamcinolone was tried for the rash in both ears as well as over the left eyebrow.  In Sep 2024 she reported an outbreak of DLE on the face and in the ears and hands. Prednisone was administered.  7.  Abnormal LFTs: noted in 2023; unclear origin but consider anti-fungal med administered for toenail fungus. 8.  Bone Health: evaluated with BMD by OB/GYN 9.  Vertigo: episode in fall 2023 without underlying viral infection; improved after 10 days.   Meds Probiotic Plaquenil 400 mg/day CellCept 0.5 gm/d Vitamin D 2000 IU/d  Vaccine Prevnar 13 10/16/18 ([id number]; exp 2/20) PNVX 23 10/1/19 ([id number]; exp 3/25/21) Fluzone Trivalent  10/1/24 (U 8523 AA; exp 6/30/2025)

## 2024-10-03 NOTE — HISTORY OF PRESENT ILLNESS
[FreeTextEntry1] : 1.  SLE: Onset in 2012 with fatigue, fever, arthritis, and nephritis.  A kidney biopsy was performed around 2013 (results unknown).  She was treated with MMF 3 g/d and did well until 2016 at which time she had a  flare (extra-renal: malar rash, SLE in the scalp, Raynauds, alopecia).  Plaquenil and steroids were added.  Since that time she has done quite well.  She has not had cardiopulmonary involvement, oral ulcers, thrombosis. In Apr 2020 she developed a heavy chest, which improved spontaneously.  She also had pain in her fingertips, although no ischemic lesions were noted.  These also improved on their own. In early Jun 2021 she reported nausea without vomiting or abdominal pain that was improving on its own.  She also noted an abnormal sensation in the fingertips and toes without pain or discoloration. Her Feb 2023 labs were notable for increased proteinuria and DNA Ab. Same was true in Mar.  A similar trend would mean more aggressive treatment with or without a kidney biopsy. However, labs improved towards mid-2023.  2.  OB history:  uncomplicated pregnancy at age 16 3.  Migraine: long-standing problem, although not problematic in recent times until Aug 2020 at which time she had headache for a week.  The symptoms resolved. She saw a neurologist, and an MRI was ordered. However, she was not able to tolerate confinement in the machine. She had a follow-up with neurology in the late fall 2023.  4.  RTC tendonitis: pain in the left shoulder that was improving in late spring 2021 with conservative measures. 5.  COVID Infection: In mid-Feb 2023, she developed fever and congestion secondary to COVID. These symptoms resolved, but she was left quite fatigued. Leg pain then ensued, although this has been getting better.  6.  DLE: lesions in ears more active in May 2023.  She tried vitamin ointment without benefit. Eventually there was improvement. However, because of more activity, OTC hydrocortisone was unsuccessfully tried. In Apr 2024, triamcinolone was tried for the rash in both ears as well as over the left eyebrow.  In Sep 2024 she reported an outbreak of DLE on the face and in the ears and hands. Prednisone was administered.  7.  Abnormal LFTs: noted in 2023; unclear origin but consider anti-fungal med administered for toenail fungus. 8.  Bone Health: evaluated with BMD by OB/GYN 9.  Vertigo: episode in fall 2023 without underlying viral infection; improved after 10 days.   Meds Probiotic Plaquenil 400 mg/day CellCept 0.5 gm/d Vitamin D 2000 IU/d  Vaccine Prevnar 13 10/16/18 ([id number]; exp 2/20) PNVX 23 10/1/19 ([id number]; exp 3/25/21) Fluzone Trivalent  10/1/24 (U 8523 AA; exp 6/30/2025)

## 2024-10-06 LAB — HYDROXYCHLOROQUINE CONCENTRATION: 1290.5 NG/ML

## 2024-10-15 ENCOUNTER — APPOINTMENT (OUTPATIENT)
Dept: OBGYN | Facility: CLINIC | Age: 49
End: 2024-10-15
Payer: COMMERCIAL

## 2024-10-15 VITALS
WEIGHT: 150 LBS | HEIGHT: 63 IN | SYSTOLIC BLOOD PRESSURE: 120 MMHG | BODY MASS INDEX: 26.58 KG/M2 | DIASTOLIC BLOOD PRESSURE: 70 MMHG

## 2024-10-15 DIAGNOSIS — N90.89 OTHER SPECIFIED NONINFLAMMATORY DISORDERS OF VULVA AND PERINEUM: ICD-10-CM

## 2024-10-15 DIAGNOSIS — N89.8 OTHER SPECIFIED NONINFLAMMATORY DISORDERS OF VAGINA: ICD-10-CM

## 2024-10-15 PROCEDURE — 99459 PELVIC EXAMINATION: CPT

## 2024-10-15 PROCEDURE — 99213 OFFICE O/P EST LOW 20 MIN: CPT | Mod: 25

## 2024-10-15 RX ORDER — ESTRADIOL 0.1 MG/G
0.1 CREAM VAGINAL
Qty: 1 | Refills: 3 | Status: ACTIVE | COMMUNITY
Start: 2024-10-15 | End: 1900-01-01

## 2024-10-17 ENCOUNTER — NON-APPOINTMENT (OUTPATIENT)
Age: 49
End: 2024-10-17

## 2024-10-17 LAB
C TRACH RRNA SPEC QL NAA+PROBE: NOT DETECTED
CANDIDA VAG CYTO: DETECTED
G VAGINALIS+PREV SP MTYP VAG QL MICRO: DETECTED
N GONORRHOEA RRNA SPEC QL NAA+PROBE: NOT DETECTED
SOURCE AMPLIFICATION: NORMAL
SOURCE AMPLIFICATION: NORMAL
T VAGINALIS RRNA SPEC QL NAA+PROBE: NOT DETECTED
T VAGINALIS VAG QL WET PREP: NOT DETECTED

## 2024-10-17 RX ORDER — METRONIDAZOLE 500 MG/1
500 TABLET ORAL TWICE DAILY
Qty: 14 | Refills: 0 | Status: ACTIVE | COMMUNITY
Start: 2024-10-17 | End: 1900-01-01

## 2024-10-17 RX ORDER — FLUCONAZOLE 150 MG/1
150 TABLET ORAL
Qty: 1 | Refills: 3 | Status: ACTIVE | COMMUNITY
Start: 2024-10-17 | End: 1900-01-01

## 2024-10-22 ENCOUNTER — NON-APPOINTMENT (OUTPATIENT)
Age: 49
End: 2024-10-22

## 2024-10-22 ENCOUNTER — EMERGENCY (EMERGENCY)
Facility: HOSPITAL | Age: 49
LOS: 1 days | Discharge: HOME | End: 2024-10-22
Attending: EMERGENCY MEDICINE | Admitting: EMERGENCY MEDICINE
Payer: COMMERCIAL

## 2024-10-22 VITALS
SYSTOLIC BLOOD PRESSURE: 136 MMHG | OXYGEN SATURATION: 97 % | HEART RATE: 73 BPM | TEMPERATURE: 98 F | RESPIRATION RATE: 20 BRPM | DIASTOLIC BLOOD PRESSURE: 86 MMHG

## 2024-10-22 LAB
ALBUMIN SERPL ELPH-MCNC: 3.8 G/DL — SIGNIFICANT CHANGE UP (ref 3.3–5)
ALP SERPL-CCNC: 79 U/L — SIGNIFICANT CHANGE UP (ref 40–120)
ALT FLD-CCNC: 21 U/L — SIGNIFICANT CHANGE UP (ref 4–33)
ANION GAP SERPL CALC-SCNC: 13 MMOL/L — SIGNIFICANT CHANGE UP (ref 7–14)
APPEARANCE UR: CLEAR — SIGNIFICANT CHANGE UP
APTT BLD: 27.1 SEC — SIGNIFICANT CHANGE UP (ref 24.5–35.6)
AST SERPL-CCNC: 43 U/L — HIGH (ref 4–32)
BACTERIA # UR AUTO: NEGATIVE /HPF — SIGNIFICANT CHANGE UP
BASOPHILS # BLD AUTO: 0.01 K/UL — SIGNIFICANT CHANGE UP (ref 0–0.2)
BASOPHILS NFR BLD AUTO: 0.1 % — SIGNIFICANT CHANGE UP (ref 0–2)
BILIRUB SERPL-MCNC: 0.4 MG/DL — SIGNIFICANT CHANGE UP (ref 0.2–1.2)
BILIRUB UR-MCNC: NEGATIVE — SIGNIFICANT CHANGE UP
BLOOD GAS VENOUS COMPREHENSIVE RESULT: SIGNIFICANT CHANGE UP
BUN SERPL-MCNC: 16 MG/DL — SIGNIFICANT CHANGE UP (ref 7–23)
CALCIUM SERPL-MCNC: 9.3 MG/DL — SIGNIFICANT CHANGE UP (ref 8.4–10.5)
CAST: 0 /LPF — SIGNIFICANT CHANGE UP (ref 0–4)
CHLORIDE SERPL-SCNC: 105 MMOL/L — SIGNIFICANT CHANGE UP (ref 98–107)
CO2 SERPL-SCNC: 20 MMOL/L — LOW (ref 22–31)
COLOR SPEC: YELLOW — SIGNIFICANT CHANGE UP
CREAT SERPL-MCNC: 0.65 MG/DL — SIGNIFICANT CHANGE UP (ref 0.5–1.3)
D DIMER BLD IA.RAPID-MCNC: <150 NG/ML DDU — SIGNIFICANT CHANGE UP
DIFF PNL FLD: NEGATIVE — SIGNIFICANT CHANGE UP
EGFR: 107 ML/MIN/1.73M2 — SIGNIFICANT CHANGE UP
EOSINOPHIL # BLD AUTO: 0 K/UL — SIGNIFICANT CHANGE UP (ref 0–0.5)
EOSINOPHIL NFR BLD AUTO: 0 % — SIGNIFICANT CHANGE UP (ref 0–6)
GLUCOSE SERPL-MCNC: 98 MG/DL — SIGNIFICANT CHANGE UP (ref 70–99)
GLUCOSE UR QL: NEGATIVE MG/DL — SIGNIFICANT CHANGE UP
HCG SERPL-ACNC: 2.8 MIU/ML — SIGNIFICANT CHANGE UP
HCT VFR BLD CALC: 37.4 % — SIGNIFICANT CHANGE UP (ref 34.5–45)
HGB BLD-MCNC: 12.6 G/DL — SIGNIFICANT CHANGE UP (ref 11.5–15.5)
IANC: 5.85 K/UL — SIGNIFICANT CHANGE UP (ref 1.8–7.4)
IMM GRANULOCYTES NFR BLD AUTO: 0.4 % — SIGNIFICANT CHANGE UP (ref 0–0.9)
INR BLD: 0.97 RATIO — SIGNIFICANT CHANGE UP (ref 0.85–1.16)
KETONES UR-MCNC: NEGATIVE MG/DL — SIGNIFICANT CHANGE UP
LEUKOCYTE ESTERASE UR-ACNC: NEGATIVE — SIGNIFICANT CHANGE UP
LIDOCAIN IGE QN: 26 U/L — SIGNIFICANT CHANGE UP (ref 7–60)
LYMPHOCYTES # BLD AUTO: 1.99 K/UL — SIGNIFICANT CHANGE UP (ref 1–3.3)
LYMPHOCYTES # BLD AUTO: 23.4 % — SIGNIFICANT CHANGE UP (ref 13–44)
MCHC RBC-ENTMCNC: 29 PG — SIGNIFICANT CHANGE UP (ref 27–34)
MCHC RBC-ENTMCNC: 33.7 GM/DL — SIGNIFICANT CHANGE UP (ref 32–36)
MCV RBC AUTO: 86.2 FL — SIGNIFICANT CHANGE UP (ref 80–100)
MONOCYTES # BLD AUTO: 0.63 K/UL — SIGNIFICANT CHANGE UP (ref 0–0.9)
MONOCYTES NFR BLD AUTO: 7.4 % — SIGNIFICANT CHANGE UP (ref 2–14)
NEUTROPHILS # BLD AUTO: 5.85 K/UL — SIGNIFICANT CHANGE UP (ref 1.8–7.4)
NEUTROPHILS NFR BLD AUTO: 68.7 % — SIGNIFICANT CHANGE UP (ref 43–77)
NITRITE UR-MCNC: NEGATIVE — SIGNIFICANT CHANGE UP
NRBC # BLD: 0 /100 WBCS — SIGNIFICANT CHANGE UP (ref 0–0)
NRBC # FLD: 0 K/UL — SIGNIFICANT CHANGE UP (ref 0–0)
PH UR: 7 — SIGNIFICANT CHANGE UP (ref 5–8)
PLATELET # BLD AUTO: 179 K/UL — SIGNIFICANT CHANGE UP (ref 150–400)
POTASSIUM SERPL-MCNC: 5.9 MMOL/L — HIGH (ref 3.5–5.3)
POTASSIUM SERPL-SCNC: 5.9 MMOL/L — HIGH (ref 3.5–5.3)
PROT SERPL-MCNC: 7.6 G/DL — SIGNIFICANT CHANGE UP (ref 6–8.3)
PROT UR-MCNC: 100 MG/DL
PROTHROM AB SERPL-ACNC: 11.2 SEC — SIGNIFICANT CHANGE UP (ref 9.9–13.4)
RBC # BLD: 4.34 M/UL — SIGNIFICANT CHANGE UP (ref 3.8–5.2)
RBC # FLD: 13.2 % — SIGNIFICANT CHANGE UP (ref 10.3–14.5)
RBC CASTS # UR COMP ASSIST: 1 /HPF — SIGNIFICANT CHANGE UP (ref 0–4)
SODIUM SERPL-SCNC: 138 MMOL/L — SIGNIFICANT CHANGE UP (ref 135–145)
SP GR SPEC: 1.01 — SIGNIFICANT CHANGE UP (ref 1–1.03)
SQUAMOUS # UR AUTO: 0 /HPF — SIGNIFICANT CHANGE UP (ref 0–5)
TROPONIN T, HIGH SENSITIVITY RESULT: 7 NG/L — SIGNIFICANT CHANGE UP
TSH SERPL-MCNC: 3.12 UIU/ML — SIGNIFICANT CHANGE UP (ref 0.27–4.2)
UROBILINOGEN FLD QL: 0.2 MG/DL — SIGNIFICANT CHANGE UP (ref 0.2–1)
WBC # BLD: 8.51 K/UL — SIGNIFICANT CHANGE UP (ref 3.8–10.5)
WBC # FLD AUTO: 8.51 K/UL — SIGNIFICANT CHANGE UP (ref 3.8–10.5)
WBC UR QL: 0 /HPF — SIGNIFICANT CHANGE UP (ref 0–5)

## 2024-10-22 PROCEDURE — 93010 ELECTROCARDIOGRAM REPORT: CPT

## 2024-10-22 PROCEDURE — 99284 EMERGENCY DEPT VISIT MOD MDM: CPT

## 2024-10-22 PROCEDURE — 71046 X-RAY EXAM CHEST 2 VIEWS: CPT | Mod: 26

## 2024-10-22 NOTE — ED ADULT NURSE NOTE - OBJECTIVE STATEMENT
Patient presents to ED A04 ambulatory at baseline coming in complaining of SOB while walking up the stairs. History of Lupus. Respirations even and unlabored. Lung sounds clear with equal chest rise bilaterally. ABD is soft, non tender, non distended with normal active bowel sounds No complaints of chest pain, headache, nausea, dizziness, vomiting  fever, chills verbalized. 20GRAC in place, labs sent pending XR

## 2024-10-22 NOTE — ED ADULT TRIAGE NOTE - CHIEF COMPLAINT QUOTE
Pt c/o shortness of breath starting today while walking up the stairs. Pt states she felt a "flutter" in her chest. Pt recently finished Prednisone for lupus flare up. Denies chest pain. Sent to ED by Urgent Care.

## 2024-10-23 VITALS
SYSTOLIC BLOOD PRESSURE: 126 MMHG | DIASTOLIC BLOOD PRESSURE: 69 MMHG | TEMPERATURE: 98 F | RESPIRATION RATE: 20 BRPM | OXYGEN SATURATION: 100 % | HEART RATE: 99 BPM

## 2024-10-23 NOTE — ED ADULT NURSE REASSESSMENT NOTE - NS ED NURSE REASSESS COMMENT FT1
Upon reassessment pt A&Ox3 sitting up in stretcher resting comfortably. Respirations even and unlabored on room air. No acute distress noted. Denies headache, dizziness, chest pain and SOB at this time. VS as noted in flow sheet. Pt discharged by MD Brunson. at bedside. Pt ambulated out of ED with steady gait.

## 2024-10-23 NOTE — ED PROVIDER NOTE - PATIENT PORTAL LINK FT
You can access the FollowMyHealth Patient Portal offered by [facility] by registering at the following website: [email or web address]. By joining [employer]’s FollowMyHealth portal, you will also be able to view your health information using other applications (apps) compatible with our system.

## 2024-10-23 NOTE — ED PROVIDER NOTE - NSICDXPASTMEDICALHX_GEN_ALL_CORE_FT
PAST MEDICAL HISTORY:  Fibroids     Lupus     Lupus (systemic lupus erythematosus) c/b nephritis on Cellcept

## 2024-10-23 NOTE — ED PROVIDER NOTE - CLINICAL SUMMARY MEDICAL DECISION MAKING FREE TEXT BOX
The patient is a 49y Female who has a past medical and surgery history of SLE c/b nephritis on cellcept Fibroids PTED with shortness of breath starting today and a "fluttering" in her chest while walking up the stairs. In [facility] prior to her just completing a prednisone course for lupus flare up. No chest pain during episode. Went to  who sent her to ED for evaluation    Vital Signs Stable   PE: as described; my additions and exceptions are noted in the chart    DATA:  EKG: pending at time of evaluation  LAB: Pending at time of evaluation    IMPRESSION/RISK:  Dx=Palpitations   Consideration include Patient looks well will check creatinine suspicion for PE sufficiently low that if DDimer negative would not w/u for PE   Plan  as above will monitor and check trop and cxr  reassess  further w/u as per results and response  anticipate d/c

## 2024-10-23 NOTE — ED PROVIDER NOTE - OBJECTIVE STATEMENT
The patient is a 49y Female who has a past medical and surgery history of SLE c/b nephritis on cellcept Fibroids PTED with shortness of breath starting today and a "fluttering" in her chest while walking up the stairs. In [facility] prior to her just completing a prednisone course for lupus flare up. No chest pain during episode. Went to  who sent her to ED for evaluation

## 2024-10-23 NOTE — ED PROVIDER NOTE - NSFOLLOWUPINSTRUCTIONS_ED_ALL_ED_FT
Heart Palpitations    WHAT YOU NEED TO KNOW:  Heart palpitations are feelings that your heart races, jumps, throbs, or flutters. You may feel extra beats, no beats for a short time, or skipped beats. You may have these feelings in your chest, throat, or neck. They may happen when you are sitting, standing, or lying. Heart palpitations may be frightening, but are usually not caused by a serious problem.   DISCHARGE INSTRUCTIONS:  Call 911 or have someone else call for any of the following:   •You have any of the following signs of a heart attack: ?Squeezing, pressure, or pain in your chest  ?You may also have any of the following: ?Discomfort or pain in your back, neck, jaw, stomach, or arm  ?Shortness of breath  ?Nausea or vomiting  ?Lightheadedness or a sudden cold sweat  •You have any of the following signs of a stroke: ?Numbness or drooping on one side of your face   ?Weakness in an arm or leg  ?Confusion or difficulty speaking  ?Dizziness, a severe headache, or vision loss  •You faint or lose consciousness.     Seek care immediately if:   •Your palpitations happen more often or last longer than usual.   •You have palpitations and shortness of breath, nausea, sweating, or dizziness.     Contact your healthcare provider if:   •You have questions or concerns about your condition or care.  Follow up with your healthcare provider as directed: You may need to follow up with a cardiologist. You may need tests to check for heart problems that cause palpitations. Write down your questions so you remember to ask them during your visits.   Keep a record: Write down when your palpitations start and stop, what you were doing when they started, and your symptoms. Keep track of what you ate or drank within a few hours of your palpitations. Include anything that seemed to help your symptoms, such as lying down or holding your breath. This record will help you and your healthcare provider learn what triggers your palpitations. Bring this record with you to your follow up visits.  Help prevent heart palpitations:   •Manage stress and anxiety. Find ways to relax such as listening to music, meditating, or doing yoga. Exercise can also help decrease stress and anxiety. Talk to someone you trust about your stress or anxiety. You can also talk to a therapist.   •Get plenty of sleep every night. Ask your healthcare provider how much sleep you need each night.   •Do not drink caffeine or alcohol. Caffeine and alcohol can make your palpitations worse. Caffeine is found in soda, coffee, tea, chocolate, and drinks that increase your energy.   •Do not smoke. Nicotine and other chemicals in cigarettes and cigars may damage your heart and blood vessels. Ask your healthcare provider for information if you currently smoke and need help to quit. E-cigarettes or smokeless tobacco still contain nicotine. Talk to your healthcare provider before you use these products.   •Do not use illegal drugs. Talk to your healthcare provider if you use illegal drugs and want help to quit.

## 2024-10-27 PROBLEM — A60.04 HERPES SIMPLEX VULVOVAGINITIS: Status: ACTIVE | Noted: 2024-10-27

## 2024-10-27 LAB
HHV SPEC CULT: ABNORMAL
HSV TYPE 1: NORMAL
HSV TYPE 2: NORMAL

## 2024-10-29 ENCOUNTER — NON-APPOINTMENT (OUTPATIENT)
Age: 49
End: 2024-10-29

## 2024-10-30 ENCOUNTER — NON-APPOINTMENT (OUTPATIENT)
Age: 49
End: 2024-10-30

## 2024-11-02 ENCOUNTER — NON-APPOINTMENT (OUTPATIENT)
Age: 49
End: 2024-11-02

## 2024-11-02 ENCOUNTER — APPOINTMENT (OUTPATIENT)
Dept: OPHTHALMOLOGY | Facility: CLINIC | Age: 49
End: 2024-11-02
Payer: COMMERCIAL

## 2024-11-02 PROCEDURE — 92012 INTRM OPH EXAM EST PATIENT: CPT

## 2024-11-03 ENCOUNTER — OUTPATIENT (OUTPATIENT)
Dept: OUTPATIENT SERVICES | Facility: HOSPITAL | Age: 49
LOS: 1 days | End: 2024-11-03
Payer: COMMERCIAL

## 2024-11-03 DIAGNOSIS — Z00.8 ENCOUNTER FOR OTHER GENERAL EXAMINATION: ICD-10-CM

## 2024-11-03 PROCEDURE — 76536 US EXAM OF HEAD AND NECK: CPT

## 2024-12-02 ENCOUNTER — APPOINTMENT (OUTPATIENT)
Dept: RHEUMATOLOGY | Facility: CLINIC | Age: 49
End: 2024-12-02
Payer: COMMERCIAL

## 2024-12-02 VITALS
TEMPERATURE: 98.1 F | BODY MASS INDEX: 26.58 KG/M2 | HEIGHT: 63 IN | WEIGHT: 150 LBS | RESPIRATION RATE: 16 BRPM | OXYGEN SATURATION: 98 % | HEART RATE: 94 BPM | SYSTOLIC BLOOD PRESSURE: 121 MMHG | DIASTOLIC BLOOD PRESSURE: 82 MMHG

## 2024-12-02 DIAGNOSIS — M32.14 GLOMERULAR DISEASE IN SYSTEMIC LUPUS ERYTHEMATOSUS: ICD-10-CM

## 2024-12-02 DIAGNOSIS — Z79.60 LONG TERM (CURRENT) USE OF UNSPECIFIED IMMUNOMODULATORS AND IMMUNOSUPPRESSANTS: ICD-10-CM

## 2024-12-02 DIAGNOSIS — L93.0 DISCOID LUPUS ERYTHEMATOSUS: ICD-10-CM

## 2024-12-02 DIAGNOSIS — Z79.899 OTHER LONG TERM (CURRENT) DRUG THERAPY: ICD-10-CM

## 2024-12-02 DIAGNOSIS — M32.9 SYSTEMIC LUPUS ERYTHEMATOSUS, UNSPECIFIED: ICD-10-CM

## 2024-12-02 PROCEDURE — G2211 COMPLEX E/M VISIT ADD ON: CPT

## 2024-12-02 PROCEDURE — 99214 OFFICE O/P EST MOD 30 MIN: CPT

## 2024-12-03 ENCOUNTER — APPOINTMENT (OUTPATIENT)
Dept: OBGYN | Facility: CLINIC | Age: 49
End: 2024-12-03

## 2024-12-03 LAB
ALBUMIN SERPL ELPH-MCNC: 4.1 G/DL
ALP BLD-CCNC: 74 U/L
ALT SERPL-CCNC: 14 U/L
ANION GAP SERPL CALC-SCNC: 13 MMOL/L
APPEARANCE: CLEAR
AST SERPL-CCNC: 21 U/L
BACTERIA: NEGATIVE /HPF
BASOPHILS # BLD AUTO: 0.01 K/UL
BASOPHILS NFR BLD AUTO: 0.1 %
BILIRUB SERPL-MCNC: 0.3 MG/DL
BILIRUBIN URINE: NEGATIVE
BLOOD URINE: NEGATIVE
BUN SERPL-MCNC: 8 MG/DL
C3 SERPL-MCNC: 101 MG/DL
C4 SERPL-MCNC: 14 MG/DL
CALCIUM SERPL-MCNC: 9.9 MG/DL
CAST: 0 /LPF
CHLORIDE SERPL-SCNC: 102 MMOL/L
CK SERPL-CCNC: 63 U/L
CO2 SERPL-SCNC: 26 MMOL/L
COLOR: YELLOW
CREAT SERPL-MCNC: 0.71 MG/DL
CREAT SPEC-SCNC: 75 MG/DL
CREAT/PROT UR: 0.2 RATIO
DSDNA AB SER-ACNC: 42 IU/ML
EGFR: 104 ML/MIN/1.73M2
EOSINOPHIL # BLD AUTO: 0 K/UL
EOSINOPHIL NFR BLD AUTO: 0 %
EPITHELIAL CELLS: 3 /HPF
GLUCOSE QUALITATIVE U: NEGATIVE MG/DL
HCT VFR BLD CALC: 37.6 %
HGB BLD-MCNC: 12.7 G/DL
IMM GRANULOCYTES NFR BLD AUTO: 0.3 %
KETONES URINE: NEGATIVE MG/DL
LEUKOCYTE ESTERASE URINE: NEGATIVE
LYMPHOCYTES # BLD AUTO: 1.61 K/UL
LYMPHOCYTES NFR BLD AUTO: 20.8 %
MAN DIFF?: NORMAL
MCHC RBC-ENTMCNC: 29.3 PG
MCHC RBC-ENTMCNC: 33.8 G/DL
MCV RBC AUTO: 86.8 FL
MICROSCOPIC-UA: NORMAL
MONOCYTES # BLD AUTO: 0.61 K/UL
MONOCYTES NFR BLD AUTO: 7.9 %
NEUTROPHILS # BLD AUTO: 5.49 K/UL
NEUTROPHILS NFR BLD AUTO: 70.9 %
NITRITE URINE: NEGATIVE
PH URINE: 7
PLATELET # BLD AUTO: 211 K/UL
POTASSIUM SERPL-SCNC: 4.6 MMOL/L
PROT SERPL-MCNC: 7 G/DL
PROT UR-MCNC: 14 MG/DL
PROTEIN URINE: 30 MG/DL
RBC # BLD: 4.33 M/UL
RBC # FLD: 13 %
RED BLOOD CELLS URINE: 1 /HPF
SODIUM SERPL-SCNC: 142 MMOL/L
SPECIFIC GRAVITY URINE: 1.01
UROBILINOGEN URINE: 0.2 MG/DL
WBC # FLD AUTO: 7.74 K/UL
WHITE BLOOD CELLS URINE: 1 /HPF

## 2024-12-11 ENCOUNTER — RX RENEWAL (OUTPATIENT)
Age: 49
End: 2024-12-11

## 2025-02-10 ENCOUNTER — NON-APPOINTMENT (OUTPATIENT)
Age: 50
End: 2025-02-10

## 2025-03-04 ENCOUNTER — APPOINTMENT (OUTPATIENT)
Dept: RHEUMATOLOGY | Facility: CLINIC | Age: 50
End: 2025-03-04
Payer: COMMERCIAL

## 2025-03-04 VITALS
BODY MASS INDEX: 26.58 KG/M2 | HEART RATE: 86 BPM | DIASTOLIC BLOOD PRESSURE: 84 MMHG | SYSTOLIC BLOOD PRESSURE: 128 MMHG | HEIGHT: 63 IN | WEIGHT: 150 LBS | OXYGEN SATURATION: 98 %

## 2025-03-04 DIAGNOSIS — Z79.899 OTHER LONG TERM (CURRENT) DRUG THERAPY: ICD-10-CM

## 2025-03-04 DIAGNOSIS — Z79.60 LONG TERM (CURRENT) USE OF UNSPECIFIED IMMUNOMODULATORS AND IMMUNOSUPPRESSANTS: ICD-10-CM

## 2025-03-04 DIAGNOSIS — M32.9 SYSTEMIC LUPUS ERYTHEMATOSUS, UNSPECIFIED: ICD-10-CM

## 2025-03-04 DIAGNOSIS — M32.14 GLOMERULAR DISEASE IN SYSTEMIC LUPUS ERYTHEMATOSUS: ICD-10-CM

## 2025-03-04 DIAGNOSIS — L93.0 DISCOID LUPUS ERYTHEMATOSUS: ICD-10-CM

## 2025-03-04 PROCEDURE — G2211 COMPLEX E/M VISIT ADD ON: CPT

## 2025-03-04 PROCEDURE — 99214 OFFICE O/P EST MOD 30 MIN: CPT

## 2025-03-05 LAB
ALBUMIN SERPL ELPH-MCNC: 4.3 G/DL
ALP BLD-CCNC: 107 U/L
ALT SERPL-CCNC: 11 U/L
ANION GAP SERPL CALC-SCNC: 15 MMOL/L
APPEARANCE: CLEAR
AST SERPL-CCNC: 23 U/L
BACTERIA: NEGATIVE /HPF
BASOPHILS # BLD AUTO: 0.03 K/UL
BASOPHILS NFR BLD AUTO: 0.6 %
BILIRUB SERPL-MCNC: 0.3 MG/DL
BILIRUBIN URINE: NEGATIVE
BLOOD URINE: NEGATIVE
BUN SERPL-MCNC: 8 MG/DL
C3 SERPL-MCNC: 103 MG/DL
C4 SERPL-MCNC: 12 MG/DL
CALCIUM SERPL-MCNC: 9.6 MG/DL
CAST: 0 /LPF
CHLORIDE SERPL-SCNC: 102 MMOL/L
CK SERPL-CCNC: 50 U/L
CO2 SERPL-SCNC: 23 MMOL/L
COLOR: YELLOW
CREAT SERPL-MCNC: 0.64 MG/DL
CREAT SPEC-SCNC: 37 MG/DL
CREAT/PROT UR: 0.3 RATIO
EGFRCR SERPLBLD CKD-EPI 2021: 108 ML/MIN/1.73M2
EOSINOPHIL # BLD AUTO: 0.05 K/UL
EOSINOPHIL NFR BLD AUTO: 1 %
EPITHELIAL CELLS: 4 /HPF
GLUCOSE QUALITATIVE U: NEGATIVE MG/DL
HCT VFR BLD CALC: 37.1 %
HGB BLD-MCNC: 12.4 G/DL
IMM GRANULOCYTES NFR BLD AUTO: 0.2 %
KETONES URINE: NEGATIVE MG/DL
LEUKOCYTE ESTERASE URINE: NEGATIVE
LYMPHOCYTES # BLD AUTO: 2.39 K/UL
LYMPHOCYTES NFR BLD AUTO: 49.1 %
MAN DIFF?: NORMAL
MCHC RBC-ENTMCNC: 28.3 PG
MCHC RBC-ENTMCNC: 33.4 G/DL
MCV RBC AUTO: 84.7 FL
MICROSCOPIC-UA: NORMAL
MONOCYTES # BLD AUTO: 0.4 K/UL
MONOCYTES NFR BLD AUTO: 8.2 %
NEUTROPHILS # BLD AUTO: 1.99 K/UL
NEUTROPHILS NFR BLD AUTO: 40.9 %
NITRITE URINE: NEGATIVE
PH URINE: 6.5
PLATELET # BLD AUTO: 208 K/UL
POTASSIUM SERPL-SCNC: 4.5 MMOL/L
PROT SERPL-MCNC: 7.6 G/DL
PROT UR-MCNC: 9 MG/DL
PROTEIN URINE: NORMAL MG/DL
RBC # BLD: 4.38 M/UL
RBC # FLD: 12.7 %
RED BLOOD CELLS URINE: 1 /HPF
SODIUM SERPL-SCNC: 141 MMOL/L
SPECIFIC GRAVITY URINE: 1.01
UROBILINOGEN URINE: 0.2 MG/DL
WBC # FLD AUTO: 4.87 K/UL
WHITE BLOOD CELLS URINE: 1 /HPF

## 2025-03-06 LAB — DSDNA AB SER-ACNC: 58 IU/ML

## 2025-03-08 LAB — HYDROXYCHLOROQUINE CONCENTRATION: 1469.2 NG/ML

## 2025-03-17 ENCOUNTER — APPOINTMENT (OUTPATIENT)
Dept: OBGYN | Facility: CLINIC | Age: 50
End: 2025-03-17
Payer: COMMERCIAL

## 2025-03-17 ENCOUNTER — NON-APPOINTMENT (OUTPATIENT)
Age: 50
End: 2025-03-17

## 2025-03-17 VITALS
HEIGHT: 63 IN | BODY MASS INDEX: 26.58 KG/M2 | DIASTOLIC BLOOD PRESSURE: 84 MMHG | SYSTOLIC BLOOD PRESSURE: 135 MMHG | WEIGHT: 150 LBS

## 2025-03-17 DIAGNOSIS — N90.89 OTHER SPECIFIED NONINFLAMMATORY DISORDERS OF VULVA AND PERINEUM: ICD-10-CM

## 2025-03-17 PROCEDURE — 56605 BIOPSY OF VULVA/PERINEUM: CPT

## 2025-03-24 LAB — CORE LAB BIOPSY: NORMAL

## 2025-03-25 ENCOUNTER — NON-APPOINTMENT (OUTPATIENT)
Age: 50
End: 2025-03-25

## 2025-07-29 ENCOUNTER — APPOINTMENT (OUTPATIENT)
Dept: RHEUMATOLOGY | Facility: CLINIC | Age: 50
End: 2025-07-29
Payer: COMMERCIAL

## 2025-07-29 DIAGNOSIS — M32.9 SYSTEMIC LUPUS ERYTHEMATOSUS, UNSPECIFIED: ICD-10-CM

## 2025-07-29 DIAGNOSIS — Z79.60 LONG TERM (CURRENT) USE OF UNSPECIFIED IMMUNOMODULATORS AND IMMUNOSUPPRESSANTS: ICD-10-CM

## 2025-07-29 DIAGNOSIS — M32.14 GLOMERULAR DISEASE IN SYSTEMIC LUPUS ERYTHEMATOSUS: ICD-10-CM

## 2025-07-29 PROCEDURE — 99214 OFFICE O/P EST MOD 30 MIN: CPT

## 2025-07-29 PROCEDURE — G2211 COMPLEX E/M VISIT ADD ON: CPT

## 2025-07-30 LAB
25(OH)D3 SERPL-MCNC: 50.2 NG/ML
ALBUMIN SERPL ELPH-MCNC: 4.1 G/DL
ALP BLD-CCNC: 104 U/L
ALT SERPL-CCNC: 16 U/L
ANION GAP SERPL CALC-SCNC: 13 MMOL/L
APPEARANCE: CLEAR
AST SERPL-CCNC: 31 U/L
BACTERIA: ABNORMAL /HPF
BASOPHILS # BLD AUTO: 0.04 K/UL
BASOPHILS NFR BLD AUTO: 0.7 %
BILIRUB SERPL-MCNC: 0.4 MG/DL
BILIRUBIN URINE: NEGATIVE
BLOOD URINE: NEGATIVE
BUN SERPL-MCNC: 11 MG/DL
C3 SERPL-MCNC: 100 MG/DL
C4 SERPL-MCNC: 12 MG/DL
CALCIUM SERPL-MCNC: 10.1 MG/DL
CAST: 0 /LPF
CHLORIDE SERPL-SCNC: 107 MMOL/L
CK SERPL-CCNC: 106 U/L
CO2 SERPL-SCNC: 25 MMOL/L
COLOR: YELLOW
CREAT SERPL-MCNC: 0.71 MG/DL
CREAT SPEC-SCNC: 180 MG/DL
CREAT/PROT UR: 0.1 RATIO
DSDNA AB SER-ACNC: 83 IU/ML
EGFRCR SERPLBLD CKD-EPI 2021: 103 ML/MIN/1.73M2
EOSINOPHIL # BLD AUTO: 0.06 K/UL
EOSINOPHIL NFR BLD AUTO: 1.1 %
EPITHELIAL CELLS: 2 /HPF
GLUCOSE QUALITATIVE U: NEGATIVE MG/DL
HCT VFR BLD CALC: 36.3 %
HGB BLD-MCNC: 12.4 G/DL
IMM GRANULOCYTES NFR BLD AUTO: 0.2 %
KETONES URINE: ABNORMAL MG/DL
LEUKOCYTE ESTERASE URINE: NEGATIVE
LYMPHOCYTES # BLD AUTO: 3.11 K/UL
LYMPHOCYTES NFR BLD AUTO: 57.2 %
MAN DIFF?: NORMAL
MCHC RBC-ENTMCNC: 28.8 PG
MCHC RBC-ENTMCNC: 34.2 G/DL
MCV RBC AUTO: 84.4 FL
MICROSCOPIC-UA: NORMAL
MONOCYTES # BLD AUTO: 0.46 K/UL
MONOCYTES NFR BLD AUTO: 8.5 %
NEUTROPHILS # BLD AUTO: 1.76 K/UL
NEUTROPHILS NFR BLD AUTO: 32.3 %
NITRITE URINE: NEGATIVE
PH URINE: 6
PLATELET # BLD AUTO: 182 K/UL
POTASSIUM SERPL-SCNC: 4.5 MMOL/L
PROT SERPL-MCNC: 7.3 G/DL
PROT UR-MCNC: 19 MG/DL
PROTEIN URINE: 30 MG/DL
RBC # BLD: 4.3 M/UL
RBC # FLD: 13.2 %
RED BLOOD CELLS URINE: 2 /HPF
SODIUM SERPL-SCNC: 146 MMOL/L
SPECIFIC GRAVITY URINE: 1.02
UROBILINOGEN URINE: 0.2 MG/DL
WBC # FLD AUTO: 5.44 K/UL
WHITE BLOOD CELLS URINE: 2 /HPF

## 2025-08-04 LAB — OH-CHLOROQUINE BLD-MCNC: 878 NG/ML

## 2025-08-08 ENCOUNTER — APPOINTMENT (OUTPATIENT)
Dept: ULTRASOUND IMAGING | Facility: IMAGING CENTER | Age: 50
End: 2025-08-08
Payer: COMMERCIAL

## 2025-08-08 ENCOUNTER — APPOINTMENT (OUTPATIENT)
Dept: MAMMOGRAPHY | Facility: IMAGING CENTER | Age: 50
End: 2025-08-08
Payer: COMMERCIAL

## 2025-08-08 ENCOUNTER — OUTPATIENT (OUTPATIENT)
Dept: OUTPATIENT SERVICES | Facility: HOSPITAL | Age: 50
LOS: 1 days | End: 2025-08-08
Payer: COMMERCIAL

## 2025-08-08 DIAGNOSIS — Z00.8 ENCOUNTER FOR OTHER GENERAL EXAMINATION: ICD-10-CM

## 2025-08-08 PROCEDURE — 77063 BREAST TOMOSYNTHESIS BI: CPT

## 2025-08-08 PROCEDURE — 77067 SCR MAMMO BI INCL CAD: CPT

## 2025-08-08 PROCEDURE — 77063 BREAST TOMOSYNTHESIS BI: CPT | Mod: 26

## 2025-08-08 PROCEDURE — 77067 SCR MAMMO BI INCL CAD: CPT | Mod: 26

## 2025-09-03 ENCOUNTER — NON-APPOINTMENT (OUTPATIENT)
Age: 50
End: 2025-09-03

## 2025-09-10 ENCOUNTER — APPOINTMENT (OUTPATIENT)
Dept: OBGYN | Facility: CLINIC | Age: 50
End: 2025-09-10
Payer: COMMERCIAL

## 2025-09-10 VITALS
BODY MASS INDEX: 27.29 KG/M2 | DIASTOLIC BLOOD PRESSURE: 85 MMHG | HEIGHT: 63 IN | HEART RATE: 77 BPM | SYSTOLIC BLOOD PRESSURE: 121 MMHG | WEIGHT: 154 LBS

## 2025-09-10 DIAGNOSIS — N89.8 OTHER SPECIFIED NONINFLAMMATORY DISORDERS OF VAGINA: ICD-10-CM

## 2025-09-10 PROCEDURE — 99214 OFFICE O/P EST MOD 30 MIN: CPT

## 2025-09-10 PROCEDURE — 99459 PELVIC EXAMINATION: CPT

## 2025-09-10 RX ORDER — METRONIDAZOLE 7.5 MG/G
0.75 GEL VAGINAL
Qty: 1 | Refills: 0 | Status: ACTIVE | COMMUNITY
Start: 2025-09-10 | End: 1900-01-01
